# Patient Record
Sex: FEMALE | Race: WHITE
[De-identification: names, ages, dates, MRNs, and addresses within clinical notes are randomized per-mention and may not be internally consistent; named-entity substitution may affect disease eponyms.]

---

## 2023-03-31 LAB
ALANINE AMINOTRANSFERASE (SGPT) (U/L) IN SER/PLAS: 36 U/L (ref 7–45)
ANION GAP IN SER/PLAS: 12 MMOL/L (ref 10–20)
ASPARTATE AMINOTRANSFERASE (SGOT) (U/L) IN SER/PLAS: 23 U/L (ref 9–39)
BASOPHILS (10*3/UL) IN BLOOD BY AUTOMATED COUNT: 0.07 X10E9/L (ref 0–0.1)
BASOPHILS/100 LEUKOCYTES IN BLOOD BY AUTOMATED COUNT: 0.7 % (ref 0–2)
CALCIDIOL (25 OH VITAMIN D3) (NG/ML) IN SER/PLAS: 26 NG/ML
CALCIUM (MG/DL) IN SER/PLAS: 9.5 MG/DL (ref 8.6–10.3)
CARBON DIOXIDE, TOTAL (MMOL/L) IN SER/PLAS: 27 MMOL/L (ref 21–32)
CHLORIDE (MMOL/L) IN SER/PLAS: 104 MMOL/L (ref 98–107)
CHOLESTEROL (MG/DL) IN SER/PLAS: 209 MG/DL (ref 0–199)
CHOLESTEROL IN HDL (MG/DL) IN SER/PLAS: 46 MG/DL
CHOLESTEROL/HDL RATIO: 4.5
COBALAMIN (VITAMIN B12) (PG/ML) IN SER/PLAS: 282 PG/ML (ref 211–911)
CREATININE (MG/DL) IN SER/PLAS: 0.76 MG/DL (ref 0.5–1.05)
EOSINOPHILS (10*3/UL) IN BLOOD BY AUTOMATED COUNT: 0.83 X10E9/L (ref 0–0.7)
EOSINOPHILS/100 LEUKOCYTES IN BLOOD BY AUTOMATED COUNT: 7.7 % (ref 0–6)
ERYTHROCYTE DISTRIBUTION WIDTH (RATIO) BY AUTOMATED COUNT: 13.8 % (ref 11.5–14.5)
ERYTHROCYTE MEAN CORPUSCULAR HEMOGLOBIN CONCENTRATION (G/DL) BY AUTOMATED: 31.5 G/DL (ref 32–36)
ERYTHROCYTE MEAN CORPUSCULAR VOLUME (FL) BY AUTOMATED COUNT: 84 FL (ref 80–100)
ERYTHROCYTES (10*6/UL) IN BLOOD BY AUTOMATED COUNT: 4.77 X10E12/L (ref 4–5.2)
ESTIMATED AVERAGE GLUCOSE FOR HBA1C: 103 MG/DL
FERRITIN (UG/LL) IN SER/PLAS: 18 UG/L (ref 8–150)
FOLATE (NG/ML) IN SER/PLAS: 7 NG/ML
GFR FEMALE: >90 ML/MIN/1.73M2
GLUCOSE (MG/DL) IN SER/PLAS: 93 MG/DL (ref 74–99)
HEMATOCRIT (%) IN BLOOD BY AUTOMATED COUNT: 40 % (ref 36–46)
HEMOGLOBIN (G/DL) IN BLOOD: 12.6 G/DL (ref 12–16)
HEMOGLOBIN A1C/HEMOGLOBIN TOTAL IN BLOOD: 5.2 %
IMMATURE GRANULOCYTES/100 LEUKOCYTES IN BLOOD BY AUTOMATED COUNT: 0.3 % (ref 0–0.9)
IRON (UG/DL) IN SER/PLAS: 56 UG/DL (ref 35–150)
IRON BINDING CAPACITY (UG/DL) IN SER/PLAS: 485 UG/DL (ref 240–445)
IRON SATURATION (%) IN SER/PLAS: 12 % (ref 25–45)
LDL: 122 MG/DL (ref 0–99)
LEUKOCYTES (10*3/UL) IN BLOOD BY AUTOMATED COUNT: 10.7 X10E9/L (ref 4.4–11.3)
LITHIUM (MOL/L) IN SER/PLAS: 0.74 MMOL/L (ref 0.6–1.2)
LYMPHOCYTES (10*3/UL) IN BLOOD BY AUTOMATED COUNT: 2.52 X10E9/L (ref 1.2–4.8)
LYMPHOCYTES/100 LEUKOCYTES IN BLOOD BY AUTOMATED COUNT: 23.5 % (ref 13–44)
MONOCYTES (10*3/UL) IN BLOOD BY AUTOMATED COUNT: 0.43 X10E9/L (ref 0.1–1)
MONOCYTES/100 LEUKOCYTES IN BLOOD BY AUTOMATED COUNT: 4 % (ref 2–10)
NEUTROPHILS (10*3/UL) IN BLOOD BY AUTOMATED COUNT: 6.84 X10E9/L (ref 1.2–7.7)
NEUTROPHILS/100 LEUKOCYTES IN BLOOD BY AUTOMATED COUNT: 63.8 % (ref 40–80)
NON HDL CHOLESTEROL: 163 MG/DL
PLATELETS (10*3/UL) IN BLOOD AUTOMATED COUNT: 410 X10E9/L (ref 150–450)
POTASSIUM (MMOL/L) IN SER/PLAS: 3.9 MMOL/L (ref 3.5–5.3)
SODIUM (MMOL/L) IN SER/PLAS: 139 MMOL/L (ref 136–145)
THYROTROPIN (MIU/L) IN SER/PLAS BY DETECTION LIMIT <= 0.05 MIU/L: 1.88 MIU/L (ref 0.44–3.98)
TRIGLYCERIDE (MG/DL) IN SER/PLAS: 206 MG/DL (ref 0–149)
UREA NITROGEN (MG/DL) IN SER/PLAS: 16 MG/DL (ref 6–23)
VLDL: 41 MG/DL (ref 0–40)

## 2023-05-09 ENCOUNTER — HOSPITAL ENCOUNTER (OUTPATIENT)
Dept: HOSPITAL 100 - BHIOP | Age: 33
LOS: 22 days | End: 2023-05-31
Payer: MEDICAID

## 2023-05-09 DIAGNOSIS — F31.81: Primary | ICD-10-CM

## 2023-05-09 DIAGNOSIS — F43.10: ICD-10-CM

## 2023-05-09 DIAGNOSIS — F12.90: ICD-10-CM

## 2023-05-09 DIAGNOSIS — F41.1: ICD-10-CM

## 2023-05-09 PROCEDURE — T1002 RN SERVICES UP TO 15 MINUTES: HCPCS

## 2023-05-09 PROCEDURE — 99214 OFFICE O/P EST MOD 30 MIN: CPT

## 2023-05-09 PROCEDURE — 90792 PSYCH DIAG EVAL W/MED SRVCS: CPT

## 2023-05-10 VITALS — SYSTOLIC BLOOD PRESSURE: 156 MMHG | HEART RATE: 105 BPM | DIASTOLIC BLOOD PRESSURE: 90 MMHG

## 2023-06-01 ENCOUNTER — HOSPITAL ENCOUNTER (OUTPATIENT)
Dept: HOSPITAL 100 - BHIOP | Age: 33
LOS: 29 days | End: 2023-06-30
Payer: MEDICAID

## 2023-06-01 VITALS — SYSTOLIC BLOOD PRESSURE: 156 MMHG | HEART RATE: 105 BPM | DIASTOLIC BLOOD PRESSURE: 90 MMHG

## 2023-06-01 DIAGNOSIS — F31.81: Primary | ICD-10-CM

## 2023-06-01 DIAGNOSIS — F41.1: ICD-10-CM

## 2023-06-01 DIAGNOSIS — F43.10: ICD-10-CM

## 2023-06-01 DIAGNOSIS — F12.90: ICD-10-CM

## 2023-06-01 PROCEDURE — 99214 OFFICE O/P EST MOD 30 MIN: CPT

## 2023-06-05 ENCOUNTER — OFFICE VISIT (OUTPATIENT)
Dept: PRIMARY CARE | Facility: CLINIC | Age: 33
End: 2023-06-05
Payer: COMMERCIAL

## 2023-06-05 VITALS
HEART RATE: 97 BPM | SYSTOLIC BLOOD PRESSURE: 138 MMHG | HEIGHT: 64 IN | BODY MASS INDEX: 42.17 KG/M2 | OXYGEN SATURATION: 98 % | DIASTOLIC BLOOD PRESSURE: 92 MMHG | WEIGHT: 247 LBS

## 2023-06-05 DIAGNOSIS — I10 PRIMARY HYPERTENSION: Primary | ICD-10-CM

## 2023-06-05 DIAGNOSIS — E66.01 CLASS 3 SEVERE OBESITY WITHOUT SERIOUS COMORBIDITY WITH BODY MASS INDEX (BMI) OF 40.0 TO 44.9 IN ADULT, UNSPECIFIED OBESITY TYPE (MULTI): ICD-10-CM

## 2023-06-05 DIAGNOSIS — F31.9 BIPOLAR DEPRESSION (MULTI): ICD-10-CM

## 2023-06-05 PROBLEM — E66.813 CLASS 3 SEVERE OBESITY WITHOUT SERIOUS COMORBIDITY WITH BODY MASS INDEX (BMI) OF 40.0 TO 44.9 IN ADULT: Status: ACTIVE | Noted: 2023-06-05

## 2023-06-05 PROBLEM — J30.9 RHINITIS, ALLERGIC: Status: ACTIVE | Noted: 2023-06-05

## 2023-06-05 PROBLEM — F32.A DEPRESSION: Status: ACTIVE | Noted: 2023-06-05

## 2023-06-05 PROBLEM — L71.9 ROSACEA: Status: ACTIVE | Noted: 2023-06-05

## 2023-06-05 PROCEDURE — 99214 OFFICE O/P EST MOD 30 MIN: CPT | Performed by: FAMILY MEDICINE

## 2023-06-05 PROCEDURE — 3080F DIAST BP >= 90 MM HG: CPT | Performed by: FAMILY MEDICINE

## 2023-06-05 PROCEDURE — 3075F SYST BP GE 130 - 139MM HG: CPT | Performed by: FAMILY MEDICINE

## 2023-06-05 PROCEDURE — 3008F BODY MASS INDEX DOCD: CPT | Performed by: FAMILY MEDICINE

## 2023-06-05 RX ORDER — METOPROLOL SUCCINATE 25 MG/1
25 TABLET, EXTENDED RELEASE ORAL DAILY
Qty: 30 TABLET | Refills: 5 | Status: SHIPPED | OUTPATIENT
Start: 2023-06-05 | End: 2023-07-28 | Stop reason: ALTCHOICE

## 2023-06-05 RX ORDER — LITHIUM CARBONATE 450 MG/1
900 TABLET ORAL NIGHTLY
COMMUNITY
End: 2023-07-28 | Stop reason: ALTCHOICE

## 2023-06-05 RX ORDER — ACETAMINOPHEN 500 MG
5000 TABLET ORAL DAILY
COMMUNITY
Start: 2023-04-21

## 2023-06-05 RX ORDER — PYRIDOXINE HCL (VITAMIN B6) 100 MG
100 TABLET ORAL DAILY
COMMUNITY

## 2023-06-05 RX ORDER — QUETIAPINE FUMARATE 400 MG/1
200 TABLET, FILM COATED ORAL NIGHTLY
COMMUNITY
Start: 2023-05-04 | End: 2024-05-21 | Stop reason: WASHOUT

## 2023-06-05 RX ORDER — HYDROXYZINE PAMOATE 25 MG/1
25 CAPSULE ORAL 2 TIMES DAILY
COMMUNITY
Start: 2023-05-31 | End: 2024-05-21 | Stop reason: WASHOUT

## 2023-06-05 RX ORDER — LITHIUM CARBONATE 300 MG/1
300 TABLET, FILM COATED, EXTENDED RELEASE ORAL DAILY
COMMUNITY
Start: 2023-05-10 | End: 2023-07-28 | Stop reason: ALTCHOICE

## 2023-06-05 ASSESSMENT — PATIENT HEALTH QUESTIONNAIRE - PHQ9
2. FEELING DOWN, DEPRESSED OR HOPELESS: NOT AT ALL
SUM OF ALL RESPONSES TO PHQ9 QUESTIONS 1 AND 2: 0
1. LITTLE INTEREST OR PLEASURE IN DOING THINGS: NOT AT ALL

## 2023-06-05 NOTE — PROGRESS NOTES
Subjective   Radha Corley is a 32 y.o. female who presents for Foot Swelling and Weight Gain.  Here c/o weight gain and ankle swelling.  She is seeing psychiatry for her bipolar disorder - doing outpatient therapy in [place].  Feels like her mental health is pretty stable right now.  Her BP has been running higher recently.              Objective   Visit Vitals  BP (!) 138/92   Pulse 97      Physical Exam  Vitals reviewed.   Constitutional:       General: She is not in acute distress.  Cardiovascular:      Rate and Rhythm: Normal rate and regular rhythm.      Heart sounds: No murmur heard.  Pulmonary:      Effort: Pulmonary effort is normal. No respiratory distress.      Breath sounds: Normal breath sounds.   Skin:     General: Skin is warm and dry.   Neurological:      General: No focal deficit present.      Mental Status: She is alert. Mental status is at baseline.         Assessment/Plan   Problem List Items Addressed This Visit          Endocrine/Metabolic    Class 3 severe obesity without serious comorbidity with body mass index (BMI) of 40.0 to 44.9 in adult (CMS/[facility])       Other    Bipolar depression (CMS/[facility])     Other Visit Diagnoses       Primary hypertension    -  Primary               Kerrie Fall MD

## 2023-06-05 NOTE — PATIENT INSTRUCTIONS
Will check some labs, start metoprolol for the BP - discussed diuretics but with her lithium we will try to avoid those at this time.  Increase water intake, encouraged support stockings.  Follow up here in 2 weeks.

## 2023-06-09 ENCOUNTER — LAB (OUTPATIENT)
Dept: LAB | Facility: LAB | Age: 33
End: 2023-06-09
Payer: COMMERCIAL

## 2023-06-09 DIAGNOSIS — E66.01 CLASS 3 SEVERE OBESITY WITHOUT SERIOUS COMORBIDITY WITH BODY MASS INDEX (BMI) OF 40.0 TO 44.9 IN ADULT, UNSPECIFIED OBESITY TYPE (MULTI): ICD-10-CM

## 2023-06-09 DIAGNOSIS — I10 PRIMARY HYPERTENSION: ICD-10-CM

## 2023-06-09 DIAGNOSIS — F31.9 BIPOLAR DEPRESSION (MULTI): ICD-10-CM

## 2023-06-09 LAB
ALANINE AMINOTRANSFERASE (SGPT) (U/L) IN SER/PLAS: 27 U/L (ref 7–45)
ALBUMIN (G/DL) IN SER/PLAS: 4.1 G/DL (ref 3.4–5)
ALKALINE PHOSPHATASE (U/L) IN SER/PLAS: 58 U/L (ref 33–110)
ANION GAP IN SER/PLAS: 11 MMOL/L (ref 10–20)
ASPARTATE AMINOTRANSFERASE (SGOT) (U/L) IN SER/PLAS: 17 U/L (ref 9–39)
BASOPHILS (10*3/UL) IN BLOOD BY AUTOMATED COUNT: 0.06 X10E9/L (ref 0–0.1)
BASOPHILS/100 LEUKOCYTES IN BLOOD BY AUTOMATED COUNT: 0.5 % (ref 0–2)
BILIRUBIN TOTAL (MG/DL) IN SER/PLAS: 0.4 MG/DL (ref 0–1.2)
CALCIDIOL (25 OH VITAMIN D3) (NG/ML) IN SER/PLAS: 26 NG/ML
CALCIUM (MG/DL) IN SER/PLAS: 9.5 MG/DL (ref 8.6–10.3)
CARBON DIOXIDE, TOTAL (MMOL/L) IN SER/PLAS: 25 MMOL/L (ref 21–32)
CHLORIDE (MMOL/L) IN SER/PLAS: 105 MMOL/L (ref 98–107)
CHOLESTEROL (MG/DL) IN SER/PLAS: 183 MG/DL (ref 0–199)
CHOLESTEROL IN HDL (MG/DL) IN SER/PLAS: 48 MG/DL
CHOLESTEROL/HDL RATIO: 3.8
COBALAMIN (VITAMIN B12) (PG/ML) IN SER/PLAS: 358 PG/ML (ref 211–911)
CREATININE (MG/DL) IN SER/PLAS: 0.77 MG/DL (ref 0.5–1.05)
EOSINOPHILS (10*3/UL) IN BLOOD BY AUTOMATED COUNT: 0.76 X10E9/L (ref 0–0.7)
EOSINOPHILS/100 LEUKOCYTES IN BLOOD BY AUTOMATED COUNT: 5.8 % (ref 0–6)
ERYTHROCYTE DISTRIBUTION WIDTH (RATIO) BY AUTOMATED COUNT: 14.6 % (ref 11.5–14.5)
ERYTHROCYTE MEAN CORPUSCULAR HEMOGLOBIN CONCENTRATION (G/DL) BY AUTOMATED: 31.3 G/DL (ref 32–36)
ERYTHROCYTE MEAN CORPUSCULAR VOLUME (FL) BY AUTOMATED COUNT: 82 FL (ref 80–100)
ERYTHROCYTES (10*6/UL) IN BLOOD BY AUTOMATED COUNT: 4.93 X10E12/L (ref 4–5.2)
ESTIMATED AVERAGE GLUCOSE FOR HBA1C: 108 MG/DL
GFR FEMALE: >90 ML/MIN/1.73M2
GLUCOSE (MG/DL) IN SER/PLAS: 91 MG/DL (ref 74–99)
HEMATOCRIT (%) IN BLOOD BY AUTOMATED COUNT: 40.6 % (ref 36–46)
HEMOGLOBIN (G/DL) IN BLOOD: 12.7 G/DL (ref 12–16)
HEMOGLOBIN A1C/HEMOGLOBIN TOTAL IN BLOOD: 5.4 %
IMMATURE GRANULOCYTES/100 LEUKOCYTES IN BLOOD BY AUTOMATED COUNT: 0.4 % (ref 0–0.9)
LDL: 101 MG/DL (ref 0–99)
LEUKOCYTES (10*3/UL) IN BLOOD BY AUTOMATED COUNT: 13 X10E9/L (ref 4.4–11.3)
LITHIUM (MOL/L) IN SER/PLAS: 0.78 MMOL/L (ref 0.6–1.2)
LYMPHOCYTES (10*3/UL) IN BLOOD BY AUTOMATED COUNT: 2.98 X10E9/L (ref 1.2–4.8)
LYMPHOCYTES/100 LEUKOCYTES IN BLOOD BY AUTOMATED COUNT: 22.9 % (ref 13–44)
MONOCYTES (10*3/UL) IN BLOOD BY AUTOMATED COUNT: 0.54 X10E9/L (ref 0.1–1)
MONOCYTES/100 LEUKOCYTES IN BLOOD BY AUTOMATED COUNT: 4.2 % (ref 2–10)
NEUTROPHILS (10*3/UL) IN BLOOD BY AUTOMATED COUNT: 8.61 X10E9/L (ref 1.2–7.7)
NEUTROPHILS/100 LEUKOCYTES IN BLOOD BY AUTOMATED COUNT: 66.2 % (ref 40–80)
PLATELETS (10*3/UL) IN BLOOD AUTOMATED COUNT: 397 X10E9/L (ref 150–450)
POTASSIUM (MMOL/L) IN SER/PLAS: 4.2 MMOL/L (ref 3.5–5.3)
PROTEIN TOTAL: 6.5 G/DL (ref 6.4–8.2)
SODIUM (MMOL/L) IN SER/PLAS: 137 MMOL/L (ref 136–145)
THYROTROPIN (MIU/L) IN SER/PLAS BY DETECTION LIMIT <= 0.05 MIU/L: 3.02 MIU/L (ref 0.44–3.98)
TRIGLYCERIDE (MG/DL) IN SER/PLAS: 168 MG/DL (ref 0–149)
UREA NITROGEN (MG/DL) IN SER/PLAS: 10 MG/DL (ref 6–23)
VLDL: 34 MG/DL (ref 0–40)

## 2023-06-09 PROCEDURE — 80053 COMPREHEN METABOLIC PANEL: CPT

## 2023-06-09 PROCEDURE — 82607 VITAMIN B-12: CPT

## 2023-06-09 PROCEDURE — 85025 COMPLETE CBC W/AUTO DIFF WBC: CPT

## 2023-06-09 PROCEDURE — 83036 HEMOGLOBIN GLYCOSYLATED A1C: CPT

## 2023-06-09 PROCEDURE — 84443 ASSAY THYROID STIM HORMONE: CPT

## 2023-06-09 PROCEDURE — 36415 COLL VENOUS BLD VENIPUNCTURE: CPT

## 2023-06-22 ENCOUNTER — APPOINTMENT (OUTPATIENT)
Dept: PRIMARY CARE | Facility: CLINIC | Age: 33
End: 2023-06-22
Payer: COMMERCIAL

## 2023-06-23 ENCOUNTER — OFFICE VISIT (OUTPATIENT)
Dept: PRIMARY CARE | Facility: CLINIC | Age: 33
End: 2023-06-23
Payer: COMMERCIAL

## 2023-06-23 VITALS
WEIGHT: 248.7 LBS | HEIGHT: 64 IN | BODY MASS INDEX: 42.46 KG/M2 | HEART RATE: 93 BPM | DIASTOLIC BLOOD PRESSURE: 80 MMHG | SYSTOLIC BLOOD PRESSURE: 122 MMHG

## 2023-06-23 DIAGNOSIS — F31.9 BIPOLAR DEPRESSION (MULTI): ICD-10-CM

## 2023-06-23 DIAGNOSIS — E66.01 CLASS 3 SEVERE OBESITY WITHOUT SERIOUS COMORBIDITY WITH BODY MASS INDEX (BMI) OF 40.0 TO 44.9 IN ADULT, UNSPECIFIED OBESITY TYPE (MULTI): ICD-10-CM

## 2023-06-23 DIAGNOSIS — I10 PRIMARY HYPERTENSION: ICD-10-CM

## 2023-06-23 PROCEDURE — 3008F BODY MASS INDEX DOCD: CPT | Performed by: FAMILY MEDICINE

## 2023-06-23 PROCEDURE — 3074F SYST BP LT 130 MM HG: CPT | Performed by: FAMILY MEDICINE

## 2023-06-23 PROCEDURE — 99214 OFFICE O/P EST MOD 30 MIN: CPT | Performed by: FAMILY MEDICINE

## 2023-06-23 PROCEDURE — 3079F DIAST BP 80-89 MM HG: CPT | Performed by: FAMILY MEDICINE

## 2023-06-23 NOTE — PROGRESS NOTES
Subjective   Radha Corley is a 32 y.o. female who presents for No chief complaint on file..  Here for follow up recent testing.  She is frustrated still with her weight and swelling.  She states that she did talk with the psychiatrist and her seroquel has been decreased.  They discussed decreasing the lithium as well but she did not want to make too many changes at once.  We reviewed her labs which were unremarkable and her EKG was fine.  She is not wearing compression stockings and I did encourage her to try those.  We discussed possibly starting a diuretic but want to make sure her psychiatrist is ok with that and she states that she will check with them and get back to us.  We discussed that one of the biggest risks is increased lithium in the blood stream and so it may be reasonable to decrease the lithium and start a diuretic with close monitoring.  Her BP is much better now.              Objective   Visit Vitals  /80 (BP Location: Left arm, Patient Position: Sitting, BP Cuff Size: Adult)   Pulse 93      Physical Exam  Vitals reviewed.   Constitutional:       General: She is not in acute distress.  Cardiovascular:      Rate and Rhythm: Normal rate and regular rhythm.      Heart sounds: No murmur heard.     Comments: Mod edema both lower extremities  Pulmonary:      Effort: Pulmonary effort is normal. No respiratory distress.      Breath sounds: Normal breath sounds.   Skin:     General: Skin is warm and dry.   Neurological:      General: No focal deficit present.      Mental Status: She is alert. Mental status is at baseline.        Latest Reference Range & Units 06/09/23 08:53   GLUCOSE 74 - 99 mg/dL 91   SODIUM 136 - 145 mmol/L 137   POTASSIUM 3.5 - 5.3 mmol/L 4.2   CHLORIDE 98 - 107 mmol/L 105   Bicarbonate 21 - 32 mmol/L 25   Anion Gap 10 - 20 mmol/L 11   Blood Urea Nitrogen 6 - 23 mg/dL 10   Creatinine 0.50 - 1.05 mg/dL 0.77   Calcium 8.6 - 10.3 mg/dL 9.5   Albumin 3.4 - 5.0 g/dL 4.1    Alkaline Phosphatase 33 - 110 U/L 58   ALT 7 - 45 U/L 27   AST 9 - 39 U/L 17   Bilirubin Total 0.0 - 1.2 mg/dL 0.4   Total Protein 6.4 - 8.2 g/dL 6.5   Vitamin B12 211 - 911 pg/mL 358   Hemoglobin A1C % 5.4   Thyroid Stimulating Hormone 0.44 - 3.98 mIU/L 3.02   Estimated Average Glucose MG/   WBC 4.4 - 11.3 x10E9/L 13.0 (H)   RBC 4.00 - 5.20 x10E12/L 4.93   HEMOGLOBIN 12.0 - 16.0 g/dL 12.7   HEMATOCRIT 36.0 - 46.0 % 40.6   MCV 80 - 100 fL 82   MCHC 32.0 - 36.0 g/dL 31.3 (L)   Platelets 150 - 450 x10E9/L 397   Neutrophils % 40.0 - 80.0 % 66.2   Immature Granulocytes %, Automated 0.0 - 0.9 % 0.4   Lymphocytes % 13.0 - 44.0 % 22.9   Monocytes % 2.0 - 10.0 % 4.2   Eosinophils % 0.0 - 6.0 % 5.8   Basophils % 0.0 - 2.0 % 0.5   Neutrophils Absolute 1.20 - 7.70 x10E9/L 8.61 (H)   Lymphocytes Absolute 1.20 - 4.80 x10E9/L 2.98   Monocytes Absolute 0.10 - 1.00 x10E9/L 0.54   Eosinophils Absolute 0.00 - 0.70 x10E9/L 0.76 (H)   Basophils Absolute 0.00 - 0.10 x10E9/L 0.06   RED CELL DISTRIBUTION WIDTH 11.5 - 14.5 % 14.6 (H)   GFR Female >90 mL/min/1.73m2 >90   (H): Data is abnormally high  (L): Data is abnormally low    Assessment/Plan   Problem List Items Addressed This Visit       Bipolar depression (CMS/[facility])    Relevant Orders    Follow Up In Primary Care    Class 3 severe obesity without serious comorbidity with body mass index (BMI) of 40.0 to 44.9 in adult (CMS/HCC)    Relevant Orders    Follow Up In Primary Care    Primary hypertension    Relevant Orders    Follow Up In Primary Care          Kerrie Fall MD

## 2023-06-23 NOTE — PROGRESS NOTES
Subjective   Patient ID: Radha Corley is a 32 y.o. female who presents for blood work, EKG and medication follow up. Still has still retaining fluid.     HPI     Review of Systems    Objective   There were no vitals taken for this visit.    Physical Exam    Assessment/Plan

## 2023-06-23 NOTE — PATIENT INSTRUCTIONS
She will check with her psychiatrist about possibly starting a water pill and let us know.  Encouraged her to try to wear the compression stockings.  Follow up here in 1 month.

## 2023-07-26 ENCOUNTER — TELEPHONE (OUTPATIENT)
Dept: PRIMARY CARE | Facility: CLINIC | Age: 33
End: 2023-07-26
Payer: COMMERCIAL

## 2023-07-26 NOTE — TELEPHONE ENCOUNTER
PT CALLED TO LET YOU KNOW SHE IS NO LONGER ON LITHIUM SO YOU CAN PRESCRIBE ANY WATER PILL YOU WANT. SHE USES [facility] HERE IN [place].

## 2023-07-28 ENCOUNTER — OFFICE VISIT (OUTPATIENT)
Dept: PRIMARY CARE | Facility: CLINIC | Age: 33
End: 2023-07-28
Payer: COMMERCIAL

## 2023-07-28 VITALS
BODY MASS INDEX: 42.34 KG/M2 | SYSTOLIC BLOOD PRESSURE: 142 MMHG | OXYGEN SATURATION: 98 % | HEIGHT: 64 IN | WEIGHT: 248 LBS | DIASTOLIC BLOOD PRESSURE: 92 MMHG | HEART RATE: 109 BPM

## 2023-07-28 DIAGNOSIS — E66.01 CLASS 3 SEVERE OBESITY WITHOUT SERIOUS COMORBIDITY WITH BODY MASS INDEX (BMI) OF 40.0 TO 44.9 IN ADULT, UNSPECIFIED OBESITY TYPE (MULTI): ICD-10-CM

## 2023-07-28 DIAGNOSIS — F31.9 BIPOLAR DEPRESSION (MULTI): ICD-10-CM

## 2023-07-28 DIAGNOSIS — I10 PRIMARY HYPERTENSION: ICD-10-CM

## 2023-07-28 PROCEDURE — 3008F BODY MASS INDEX DOCD: CPT | Performed by: FAMILY MEDICINE

## 2023-07-28 PROCEDURE — 99213 OFFICE O/P EST LOW 20 MIN: CPT | Performed by: FAMILY MEDICINE

## 2023-07-28 PROCEDURE — 3077F SYST BP >= 140 MM HG: CPT | Performed by: FAMILY MEDICINE

## 2023-07-28 PROCEDURE — 3080F DIAST BP >= 90 MM HG: CPT | Performed by: FAMILY MEDICINE

## 2023-07-28 RX ORDER — SPIRONOLACTONE 25 MG/1
25 TABLET ORAL DAILY
Qty: 30 TABLET | Refills: 5 | Status: SHIPPED | OUTPATIENT
Start: 2023-07-28 | End: 2024-01-17

## 2023-07-28 NOTE — PROGRESS NOTES
Subjective   Patient ID: Radha Corley is a 32 y.o. female who presents for 1 month follow up was taken off the lithium so she can start taking the water pill.     HPI     Review of Systems    Objective   There were no vitals taken for this visit.    Physical Exam    Assessment/Plan

## 2023-07-28 NOTE — PROGRESS NOTES
Subjective   Radha Corley is a 32 y.o. female who presents for No chief complaint on file..  Here for follow up weight gain, ankle swelling, bipolar disorder.  She states that she is off the lithium now and seroquel has been decreased.  She is feeling a little bit better - swelling did get a little better, but she still has some swelling.              Objective   Visit Vitals  BP (!) 142/92 (BP Location: Left arm, Patient Position: Sitting, BP Cuff Size: Adult)   Pulse 109      Physical Exam  Vitals reviewed.   Constitutional:       General: She is not in acute distress.  Pulmonary:      Effort: Pulmonary effort is normal. No respiratory distress.   Skin:     General: Skin is warm and dry.   Neurological:      General: No focal deficit present.      Mental Status: She is alert. Mental status is at baseline.         Assessment/Plan   Problem List Items Addressed This Visit       Bipolar depression (CMS/HCC)    Relevant Orders    Follow Up In Primary Care - Established    Class 3 severe obesity without serious comorbidity with body mass index (BMI) of 40.0 to 44.9 in adult (CMS/HCC)    Relevant Orders    Follow Up In Primary Care - Established    Primary hypertension    Relevant Medications    spironolactone (Aldactone) 25 mg tablet    Other Relevant Orders    Follow Up In Primary Care - Established          Kerrie Fall MD

## 2023-08-29 ENCOUNTER — OFFICE VISIT (OUTPATIENT)
Dept: PRIMARY CARE | Facility: CLINIC | Age: 33
End: 2023-08-29
Payer: COMMERCIAL

## 2023-08-29 VITALS
HEIGHT: 64 IN | OXYGEN SATURATION: 98 % | BODY MASS INDEX: 43.13 KG/M2 | SYSTOLIC BLOOD PRESSURE: 128 MMHG | HEART RATE: 104 BPM | DIASTOLIC BLOOD PRESSURE: 82 MMHG | WEIGHT: 252.6 LBS

## 2023-08-29 DIAGNOSIS — E66.01 CLASS 3 SEVERE OBESITY WITHOUT SERIOUS COMORBIDITY WITH BODY MASS INDEX (BMI) OF 40.0 TO 44.9 IN ADULT, UNSPECIFIED OBESITY TYPE (MULTI): ICD-10-CM

## 2023-08-29 DIAGNOSIS — I10 PRIMARY HYPERTENSION: ICD-10-CM

## 2023-08-29 DIAGNOSIS — F31.9 BIPOLAR DEPRESSION (MULTI): ICD-10-CM

## 2023-08-29 PROCEDURE — 99213 OFFICE O/P EST LOW 20 MIN: CPT | Performed by: FAMILY MEDICINE

## 2023-08-29 PROCEDURE — 3079F DIAST BP 80-89 MM HG: CPT | Performed by: FAMILY MEDICINE

## 2023-08-29 PROCEDURE — 3074F SYST BP LT 130 MM HG: CPT | Performed by: FAMILY MEDICINE

## 2023-08-29 PROCEDURE — 3008F BODY MASS INDEX DOCD: CPT | Performed by: FAMILY MEDICINE

## 2023-08-29 RX ORDER — METOPROLOL SUCCINATE 25 MG/1
25 TABLET, EXTENDED RELEASE ORAL DAILY
Qty: 30 TABLET | Refills: 5 | Status: SHIPPED | OUTPATIENT
Start: 2023-08-29 | End: 2023-12-01 | Stop reason: SDUPTHER

## 2023-08-29 NOTE — PROGRESS NOTES
Subjective   Radha Corley is a 32 y.o. female who presents for No chief complaint on file..  Here for follow up.  She states that recently she has started having more issues with post-coital headaches - had them in the past but had not had them in a while.  She has also had some higher BP readings at home as well.  She was on metoprolol before and did not have any side effects with that so we will restart that.             Objective   Visit Vitals  /82 (BP Location: Left arm, Patient Position: Sitting, BP Cuff Size: Adult)   Pulse 104      Physical Exam  Vitals reviewed.   Constitutional:       General: She is not in acute distress.  Cardiovascular:      Rate and Rhythm: Normal rate and regular rhythm.      Heart sounds: No murmur heard.  Pulmonary:      Effort: Pulmonary effort is normal. No respiratory distress.      Breath sounds: Normal breath sounds.   Skin:     General: Skin is warm and dry.   Neurological:      General: No focal deficit present.      Mental Status: She is alert. Mental status is at baseline.         Assessment/Plan   Problem List Items Addressed This Visit       Bipolar depression (CMS/HCC)    Relevant Orders    Follow Up In Primary Care - Established    Class 3 severe obesity without serious comorbidity with body mass index (BMI) of 40.0 to 44.9 in adult (CMS/HCC)    Relevant Orders    Follow Up In Primary Care - Established    Primary hypertension    Relevant Medications    metoprolol succinate XL (Toprol-XL) 25 mg 24 hr tablet    Other Relevant Orders    Follow Up In Primary Care - Established          Kerrie Fall MD

## 2023-08-29 NOTE — PATIENT INSTRUCTIONS
Will restart metoprolol, continue other current medications.  Follow up in a month, sooner if needed.

## 2023-08-29 NOTE — PROGRESS NOTES
Subjective   Patient ID: Radha Corley is a 32 y.o. female who presents for 1 month follow up. Patient states she is still retaining the fluid and she has increased her water intake and also states evaluating her feet. Also been taking her /92 and it has been consistently  high. Headaches every day for 2 weeks now.     HPI     Review of Systems    Objective   There were no vitals taken for this visit.    Physical Exam    Assessment/Plan           146.2

## 2023-09-28 ENCOUNTER — OFFICE VISIT (OUTPATIENT)
Dept: PRIMARY CARE | Facility: CLINIC | Age: 33
End: 2023-09-28
Payer: COMMERCIAL

## 2023-09-28 VITALS
DIASTOLIC BLOOD PRESSURE: 76 MMHG | HEART RATE: 99 BPM | WEIGHT: 252.8 LBS | SYSTOLIC BLOOD PRESSURE: 120 MMHG | BODY MASS INDEX: 43.16 KG/M2 | OXYGEN SATURATION: 99 % | HEIGHT: 64 IN

## 2023-09-28 DIAGNOSIS — I10 PRIMARY HYPERTENSION: ICD-10-CM

## 2023-09-28 DIAGNOSIS — E66.01 CLASS 3 SEVERE OBESITY WITHOUT SERIOUS COMORBIDITY WITH BODY MASS INDEX (BMI) OF 40.0 TO 44.9 IN ADULT, UNSPECIFIED OBESITY TYPE (MULTI): ICD-10-CM

## 2023-09-28 DIAGNOSIS — F31.9 BIPOLAR DEPRESSION (MULTI): ICD-10-CM

## 2023-09-28 PROCEDURE — 3008F BODY MASS INDEX DOCD: CPT | Performed by: FAMILY MEDICINE

## 2023-09-28 PROCEDURE — 3078F DIAST BP <80 MM HG: CPT | Performed by: FAMILY MEDICINE

## 2023-09-28 PROCEDURE — 99214 OFFICE O/P EST MOD 30 MIN: CPT | Performed by: FAMILY MEDICINE

## 2023-09-28 PROCEDURE — 3074F SYST BP LT 130 MM HG: CPT | Performed by: FAMILY MEDICINE

## 2023-09-28 NOTE — PATIENT INSTRUCTIONS
Continue current medications.  Follow up with specialists as scheduled.  Follow up in 3-4 months, sooner if needed.

## 2023-09-28 NOTE — PROGRESS NOTES
Subjective   Patient ID: Radha Corley is a 32 y.o. female who presents for 1 month medication follow up.     HPI     Review of Systems    Objective   There were no vitals taken for this visit.    Physical Exam    Assessment/Plan

## 2023-09-28 NOTE — PROGRESS NOTES
Subjective   Radha Corley is a 32 y.o. female who presents for No chief complaint on file..  Here for follow up headaches, HTN.  She is wearing a patch to try to help her quit smoking.  She is still having some swelling in her ankles off and on.  She feels like her medications are working well for her.       She does need a physical form filled out for a new job as [employer].  She may check with the health department for her vaccination records.      She has some lumps on her scalp - has had cysts removed from her scalp in the past and these feel similar but they are hurting/she feels pressure with them now.  At this point they are not too bothersome and she will continue to monitor - we can refer to surgeon or dermatologist in the future if she would like.              Objective   Visit Vitals  /76 (BP Location: Left arm, Patient Position: Sitting, BP Cuff Size: Adult)   Pulse 99      Physical Exam  Vitals reviewed.   Constitutional:       General: She is not in acute distress.  Cardiovascular:      Rate and Rhythm: Normal rate and regular rhythm.      Heart sounds: No murmur heard.  Pulmonary:      Effort: Pulmonary effort is normal. No respiratory distress.      Breath sounds: Normal breath sounds.   Skin:     General: Skin is warm and dry.      Comments: There are a couple of small soft masses consistent with cysts in the scalp, no erythema, no drainage.     Neurological:      General: No focal deficit present.      Mental Status: She is alert. Mental status is at baseline.         Assessment/Plan   Problem List Items Addressed This Visit       Bipolar depression (CMS/HCC)    Relevant Orders    Follow Up In Primary Care - Established    Class 3 severe obesity without serious comorbidity with body mass index (BMI) of 40.0 to 44.9 in adult (CMS/HCC)    Relevant Orders    Follow Up In Primary Care - Established    Primary hypertension    Relevant Orders    Follow Up In Primary Care - Established           Kerrie Fall MD

## 2023-10-28 ENCOUNTER — LAB (OUTPATIENT)
Dept: LAB | Facility: LAB | Age: 33
End: 2023-10-28
Payer: COMMERCIAL

## 2023-10-28 DIAGNOSIS — R53.83 OTHER FATIGUE: Primary | ICD-10-CM

## 2023-10-28 DIAGNOSIS — Z79.899 OTHER LONG TERM (CURRENT) DRUG THERAPY: ICD-10-CM

## 2023-10-28 LAB
25(OH)D3 SERPL-MCNC: 28 NG/ML (ref 30–100)
ALBUMIN SERPL BCP-MCNC: 4.2 G/DL (ref 3.4–5)
ALP SERPL-CCNC: 57 U/L (ref 33–110)
ALT SERPL W P-5'-P-CCNC: 32 U/L (ref 7–45)
ANION GAP SERPL CALC-SCNC: 11 MMOL/L (ref 10–20)
AST SERPL W P-5'-P-CCNC: 18 U/L (ref 9–39)
BASOPHILS # BLD AUTO: 0.05 X10*3/UL (ref 0–0.1)
BASOPHILS NFR BLD AUTO: 0.6 %
BILIRUB SERPL-MCNC: 0.4 MG/DL (ref 0–1.2)
BUN SERPL-MCNC: 11 MG/DL (ref 6–23)
CALCIUM SERPL-MCNC: 9 MG/DL (ref 8.6–10.3)
CHLORIDE SERPL-SCNC: 108 MMOL/L (ref 98–107)
CHOLEST SERPL-MCNC: 198 MG/DL (ref 0–199)
CHOLESTEROL/HDL RATIO: 4.6
CO2 SERPL-SCNC: 24 MMOL/L (ref 21–32)
CREAT SERPL-MCNC: 0.73 MG/DL (ref 0.5–1.05)
EOSINOPHIL # BLD AUTO: 0.55 X10*3/UL (ref 0–0.7)
EOSINOPHIL NFR BLD AUTO: 6.4 %
ERYTHROCYTE [DISTWIDTH] IN BLOOD BY AUTOMATED COUNT: 15.3 % (ref 11.5–14.5)
EST. AVERAGE GLUCOSE BLD GHB EST-MCNC: 111 MG/DL
FOLATE SERPL-MCNC: 13.8 NG/ML
GFR SERPL CREATININE-BSD FRML MDRD: >90 ML/MIN/1.73M*2
GLUCOSE SERPL-MCNC: 97 MG/DL (ref 74–99)
HBA1C MFR BLD: 5.5 %
HCT VFR BLD AUTO: 38.5 % (ref 36–46)
HDLC SERPL-MCNC: 43 MG/DL
HGB BLD-MCNC: 12.2 G/DL (ref 12–16)
IMM GRANULOCYTES # BLD AUTO: 0.03 X10*3/UL (ref 0–0.7)
IMM GRANULOCYTES NFR BLD AUTO: 0.3 % (ref 0–0.9)
IRON SATN MFR SERPL: 7 % (ref 25–45)
IRON SERPL-MCNC: 33 UG/DL (ref 35–150)
LDLC SERPL CALC-MCNC: 121 MG/DL
LYMPHOCYTES # BLD AUTO: 2.76 X10*3/UL (ref 1.2–4.8)
LYMPHOCYTES NFR BLD AUTO: 32.1 %
MCH RBC QN AUTO: 25.2 PG (ref 26–34)
MCHC RBC AUTO-ENTMCNC: 31.7 G/DL (ref 32–36)
MCV RBC AUTO: 80 FL (ref 80–100)
MONOCYTES # BLD AUTO: 0.44 X10*3/UL (ref 0.1–1)
MONOCYTES NFR BLD AUTO: 5.1 %
NEUTROPHILS # BLD AUTO: 4.76 X10*3/UL (ref 1.2–7.7)
NEUTROPHILS NFR BLD AUTO: 55.5 %
NON HDL CHOLESTEROL: 155 MG/DL (ref 0–149)
NRBC BLD-RTO: 0 /100 WBCS (ref 0–0)
PLATELET # BLD AUTO: 328 X10*3/UL (ref 150–450)
PMV BLD AUTO: 8.3 FL (ref 7.5–11.5)
POTASSIUM SERPL-SCNC: 3.9 MMOL/L (ref 3.5–5.3)
PROLACTIN SERPL-MCNC: 7.5 UG/L (ref 3–20)
PROT SERPL-MCNC: 6.9 G/DL (ref 6.4–8.2)
RBC # BLD AUTO: 4.84 X10*6/UL (ref 4–5.2)
SODIUM SERPL-SCNC: 139 MMOL/L (ref 136–145)
TIBC SERPL-MCNC: 456 UG/DL (ref 240–445)
TRIGL SERPL-MCNC: 172 MG/DL (ref 0–149)
TSH SERPL-ACNC: 0.93 MIU/L (ref 0.44–3.98)
UIBC SERPL-MCNC: 423 UG/DL (ref 110–370)
VIT B12 SERPL-MCNC: 315 PG/ML (ref 211–911)
VLDL: 34 MG/DL (ref 0–40)
WBC # BLD AUTO: 8.6 X10*3/UL (ref 4.4–11.3)

## 2023-10-28 PROCEDURE — 82746 ASSAY OF FOLIC ACID SERUM: CPT

## 2023-10-28 PROCEDURE — 36415 COLL VENOUS BLD VENIPUNCTURE: CPT

## 2023-10-28 PROCEDURE — 84443 ASSAY THYROID STIM HORMONE: CPT

## 2023-10-28 PROCEDURE — 82306 VITAMIN D 25 HYDROXY: CPT

## 2023-10-28 PROCEDURE — 83540 ASSAY OF IRON: CPT

## 2023-10-28 PROCEDURE — 80053 COMPREHEN METABOLIC PANEL: CPT

## 2023-10-28 PROCEDURE — 83550 IRON BINDING TEST: CPT

## 2023-10-28 PROCEDURE — 80061 LIPID PANEL: CPT

## 2023-10-28 PROCEDURE — 82607 VITAMIN B-12: CPT

## 2023-10-28 PROCEDURE — 84146 ASSAY OF PROLACTIN: CPT

## 2023-10-28 PROCEDURE — 83036 HEMOGLOBIN GLYCOSYLATED A1C: CPT

## 2023-10-28 PROCEDURE — 85025 COMPLETE CBC W/AUTO DIFF WBC: CPT

## 2023-11-01 ENCOUNTER — HOSPITAL ENCOUNTER (OUTPATIENT)
Dept: CARDIOLOGY | Facility: HOSPITAL | Age: 33
Discharge: HOME | End: 2023-11-01
Payer: COMMERCIAL

## 2023-11-01 DIAGNOSIS — Z79.899 OTHER LONG TERM (CURRENT) DRUG THERAPY: ICD-10-CM

## 2023-11-01 PROCEDURE — 93010 ELECTROCARDIOGRAM REPORT: CPT | Performed by: STUDENT IN AN ORGANIZED HEALTH CARE EDUCATION/TRAINING PROGRAM

## 2023-11-01 PROCEDURE — 93005 ELECTROCARDIOGRAM TRACING: CPT

## 2023-11-06 LAB
ATRIAL RATE: 97 BPM
P AXIS: 39 DEGREES
P OFFSET: 187 MS
P ONSET: 136 MS
PR INTERVAL: 170 MS
Q ONSET: 221 MS
QRS COUNT: 16 BEATS
QRS DURATION: 94 MS
QT INTERVAL: 344 MS
QTC CALCULATION(BAZETT): 436 MS
QTC FREDERICIA: 403 MS
R AXIS: 50 DEGREES
T AXIS: 48 DEGREES
T OFFSET: 393 MS
VENTRICULAR RATE: 97 BPM

## 2023-11-30 ENCOUNTER — TELEPHONE (OUTPATIENT)
Dept: PRIMARY CARE | Facility: CLINIC | Age: 33
End: 2023-11-30
Payer: COMMERCIAL

## 2023-11-30 DIAGNOSIS — I10 PRIMARY HYPERTENSION: ICD-10-CM

## 2023-11-30 NOTE — TELEPHONE ENCOUNTER
Patient left a voicemail stating her psychiatrists put her on vyvanse and wants her Metoprolol succinate increased to BID

## 2023-11-30 NOTE — TELEPHONE ENCOUNTER
We can increase the dose - it's an extended release medicine so we can increase to 50 mg daily, doesn't have to be twice a day.

## 2023-12-01 RX ORDER — METOPROLOL SUCCINATE 50 MG/1
50 TABLET, EXTENDED RELEASE ORAL DAILY
Qty: 90 TABLET | Refills: 1 | Status: SHIPPED | OUTPATIENT
Start: 2023-12-01 | End: 2024-02-29

## 2023-12-21 ENCOUNTER — OFFICE VISIT (OUTPATIENT)
Dept: PRIMARY CARE | Facility: CLINIC | Age: 33
End: 2023-12-21
Payer: COMMERCIAL

## 2023-12-21 VITALS
WEIGHT: 245.1 LBS | SYSTOLIC BLOOD PRESSURE: 140 MMHG | BODY MASS INDEX: 41.85 KG/M2 | HEIGHT: 64 IN | HEART RATE: 101 BPM | DIASTOLIC BLOOD PRESSURE: 90 MMHG | OXYGEN SATURATION: 97 %

## 2023-12-21 DIAGNOSIS — J20.9 ACUTE BRONCHITIS, UNSPECIFIED ORGANISM: Primary | ICD-10-CM

## 2023-12-21 PROCEDURE — 99213 OFFICE O/P EST LOW 20 MIN: CPT | Performed by: FAMILY MEDICINE

## 2023-12-21 PROCEDURE — 3080F DIAST BP >= 90 MM HG: CPT | Performed by: FAMILY MEDICINE

## 2023-12-21 PROCEDURE — 3077F SYST BP >= 140 MM HG: CPT | Performed by: FAMILY MEDICINE

## 2023-12-21 PROCEDURE — 3008F BODY MASS INDEX DOCD: CPT | Performed by: FAMILY MEDICINE

## 2023-12-21 RX ORDER — LISDEXAMFETAMINE DIMESYLATE 20 MG/1
20 CAPSULE ORAL
COMMUNITY
Start: 2023-11-30 | End: 2024-02-28 | Stop reason: WASHOUT

## 2023-12-21 RX ORDER — AZITHROMYCIN 250 MG/1
TABLET, FILM COATED ORAL
Qty: 6 TABLET | Refills: 0 | Status: SHIPPED | OUTPATIENT
Start: 2023-12-21 | End: 2023-12-26

## 2023-12-21 RX ORDER — PREDNISONE 20 MG/1
20 TABLET ORAL DAILY
Qty: 5 TABLET | Refills: 0 | Status: SHIPPED | OUTPATIENT
Start: 2023-12-21 | End: 2023-12-26

## 2023-12-21 RX ORDER — ALBUTEROL SULFATE 90 UG/1
2 AEROSOL, METERED RESPIRATORY (INHALATION) EVERY 4 HOURS PRN
Qty: 8 G | Refills: 5 | Status: SHIPPED | OUTPATIENT
Start: 2023-12-21 | End: 2024-02-05 | Stop reason: ALTCHOICE

## 2023-12-21 NOTE — PROGRESS NOTES
Subjective   Radha Corley is a 33 y.o. female who presents for No chief complaint on file..  Here c/o cough for the past week.  Has been to urgent care a couple of times.  She states that she was tested for covid and flu and was negative.  She states that they told her to take OTC meds.  She has a lot of congestion, runny nose, aching, fatigued.  Had mild fever.              Objective   Visit Vitals  /90 (BP Location: Left arm, Patient Position: Sitting, BP Cuff Size: Adult)   Pulse 101      Physical Exam  Vitals reviewed.   Constitutional:       General: She is not in acute distress.  Cardiovascular:      Rate and Rhythm: Normal rate and regular rhythm.      Heart sounds: No murmur heard.  Pulmonary:      Effort: Pulmonary effort is normal. No respiratory distress.      Breath sounds: Normal breath sounds.      Comments: Wheezes throughout  Skin:     General: Skin is warm and dry.   Neurological:      General: No focal deficit present.      Mental Status: She is alert. Mental status is at baseline.         Assessment/Plan   Problem List Items Addressed This Visit    None  Visit Diagnoses       Acute bronchitis, unspecified organism    -  Primary    Relevant Medications    azithromycin (Zithromax) 250 mg tablet    predniSONE (Deltasone) 20 mg tablet    albuterol (Ventolin HFA) 90 mcg/actuation inhaler               Kerrie Fall MD

## 2023-12-21 NOTE — LETTER
December 21, 2023     Patient: Radha Corley   YOB: 1990   Date of Visit: 12/21/2023       To Whom It May Concern:    Radha Corley was seen in my clinic on 12/21/2023 at 11:40 am. Please excuse Radha for her absence from work 12/20-12/21.    If you have any questions or concerns, please don't hesitate to call.         Sincerely,         Kerrie Fall MD        CC: No Recipients

## 2023-12-21 NOTE — PROGRESS NOTES
Subjective   Patient ID: Radha Corley is a 33 y.o. female who presents for continuous cough. Was at [facility] 2X and did not have a xray. Been going on since 1 week.      HPI     Review of Systems    Objective   There were no vitals taken for this visit.    Physical Exam    Assessment/Plan

## 2024-01-04 ENCOUNTER — OFFICE VISIT (OUTPATIENT)
Dept: PRIMARY CARE | Facility: CLINIC | Age: 34
End: 2024-01-04
Payer: COMMERCIAL

## 2024-01-04 VITALS
SYSTOLIC BLOOD PRESSURE: 134 MMHG | HEIGHT: 64 IN | HEART RATE: 103 BPM | WEIGHT: 242.4 LBS | BODY MASS INDEX: 41.38 KG/M2 | DIASTOLIC BLOOD PRESSURE: 86 MMHG | OXYGEN SATURATION: 98 %

## 2024-01-04 DIAGNOSIS — E66.01 CLASS 3 SEVERE OBESITY WITHOUT SERIOUS COMORBIDITY WITH BODY MASS INDEX (BMI) OF 40.0 TO 44.9 IN ADULT, UNSPECIFIED OBESITY TYPE (MULTI): ICD-10-CM

## 2024-01-04 DIAGNOSIS — D50.9 IRON DEFICIENCY ANEMIA, UNSPECIFIED IRON DEFICIENCY ANEMIA TYPE: ICD-10-CM

## 2024-01-04 DIAGNOSIS — F31.9 BIPOLAR DEPRESSION (MULTI): ICD-10-CM

## 2024-01-04 DIAGNOSIS — I10 PRIMARY HYPERTENSION: Primary | ICD-10-CM

## 2024-01-04 PROCEDURE — 99214 OFFICE O/P EST MOD 30 MIN: CPT | Performed by: FAMILY MEDICINE

## 2024-01-04 PROCEDURE — 3079F DIAST BP 80-89 MM HG: CPT | Performed by: FAMILY MEDICINE

## 2024-01-04 PROCEDURE — 3008F BODY MASS INDEX DOCD: CPT | Performed by: FAMILY MEDICINE

## 2024-01-04 PROCEDURE — 3075F SYST BP GE 130 - 139MM HG: CPT | Performed by: FAMILY MEDICINE

## 2024-01-04 PROCEDURE — 4004F PT TOBACCO SCREEN RCVD TLK: CPT | Performed by: FAMILY MEDICINE

## 2024-01-04 NOTE — PATIENT INSTRUCTIONS
Continue current medications.  Follow up with specialists as scheduled.  Follow up in 3 months, sooner if needed.

## 2024-01-04 NOTE — PROGRESS NOTES
Subjective   Radha Corley is a 33 y.o. female who presents for No chief complaint on file..  Here for follow up headaches, HTN.  Since she was here last she ended up in the ER with worsening bronchitis and also had developed an abscess in the rectal area.  She was treated with antibiotics and inhaled steroids and is feeling better now.      She has labs done with her psychiatrist and has iron deficiency.  She states that she has had to have iron infusions in the past.      She is also having issues with her tongue burning and sore.      She is interested in trying to quit smoking.  We discussed medications - given her mental health history I recommend she check with her psychiatrist before trying chantix, she has not done well with wellbutrin in the past.  She may try the patches.             Objective   Visit Vitals  /86 (BP Location: Left arm, Patient Position: Sitting, BP Cuff Size: Adult)   Pulse 103      Physical Exam  Vitals reviewed.   Constitutional:       General: She is not in acute distress.  Cardiovascular:      Rate and Rhythm: Normal rate and regular rhythm.      Heart sounds: No murmur heard.  Pulmonary:      Effort: Pulmonary effort is normal. No respiratory distress.      Breath sounds: Normal breath sounds.   Skin:     General: Skin is warm and dry.   Neurological:      General: No focal deficit present.      Mental Status: She is alert. Mental status is at baseline.         Assessment/Plan   Problem List Items Addressed This Visit       Bipolar depression (CMS/HCC)    Relevant Orders    Follow Up In Primary Care - Established    Class 3 severe obesity without serious comorbidity with body mass index (BMI) of 40.0 to 44.9 in adult (CMS/HCC)    Relevant Orders    Follow Up In Primary Care - Established    Primary hypertension - Primary    Relevant Orders    Follow Up In Primary Care - Established     Other Visit Diagnoses       Iron deficiency anemia, unspecified iron deficiency anemia  type        Relevant Orders    Referral to Hematology and Oncology    Follow Up In Primary Care - Established               Kerrie Fall MD

## 2024-01-04 NOTE — PROGRESS NOTES
Subjective   Patient ID: Radha Corley is a 33 y.o. female who presents for 3 month follow up     HPI     Review of Systems    Objective   There were no vitals taken for this visit.    Physical Exam    Assessment/Plan

## 2024-01-17 DIAGNOSIS — I10 PRIMARY HYPERTENSION: ICD-10-CM

## 2024-01-17 RX ORDER — SPIRONOLACTONE 25 MG/1
25 TABLET ORAL DAILY
Qty: 30 TABLET | Refills: 5 | Status: SHIPPED | OUTPATIENT
Start: 2024-01-17

## 2024-01-17 NOTE — PROGRESS NOTES
Patient ID: Radha Corley is a 33 y.o. female.  Referring Physician: Kerrie Fall MD  [address] [facility] [place] [place] [address]  Primary Care Provider: Kerrie Fall MD  Visit Type: Initial Visit      Subjective    HPI    Review of Systems   Constitutional:  Positive for appetite change.   Gastrointestinal:  Positive for diarrhea and nausea (due to medications).   Skin: Negative.    Psychiatric/Behavioral:  Positive for depression. The patient is nervous/anxious.           Patient is a 32 yo HTN, bipolar, borderline personality disorder, ADHD, Autism, Vitamin D Defeincey with a PMH of  and was referred to [facility] hematology for consultation of iron deficiency.     Patient reports that she has struggled with iron deficiency for awhile. In the past she has required IV infusions. Patient's CBC on 10/28/23 results Hg 12.2, MCV 80, MCHC 31.3, indicating microcytic anemia, remainder of CBC are stable.  Iron parameters on 10/28/23 results were Fe 33, Fe sat 7%, Ferritin- no results reported- indicating iron deficiency. Patient was started on oral iron, she admits not always being compliant due to GI side effects.     Today, patient presents for initial consultation. Patient reports poor energy, able to work outside the home and complete ADLs, always feels fatigues. Appetite is poor due to tongue soreness, heaviness and burning sensation. Occasional nausea due to change in anxiety/depression medications, Denies any vomiting. Chronic diarrhea, no hematochezia or melenic stools.  No abdominal pain, cramping or early satiety. Denies any urinary issues, dysuria or hematuria.  Craves ice daily.  Denies abnormal weight loss, night sweats, fever. Recently treated for bronchitis and staph infection, resolved at this time.  Denies fatigue, chills, sob, early satiety. Denies any abnormal bleeding or bruising. No recurrent infections or lymphadenopathy. No bone pain. Denies  any brittle/dry/or hair loss, nails have always peeled and been soft/weak. No known blood disorders in family. Has had surgery in past w/o issue.     Patient reports started menses at age 10. Menstrual cycle is irregular, typically last 7 days with heavy bleeding and clotting. Patient is scheduled to see OB/GYN this week to discuss treatment options.     up to date on cancer screenings-     PAST MEDICAL HISTORY:  depression/anxiety, rosacea, allergic rhinitis, obesity    SOCIAL HISTORY:  Regular Diet  Smoker- 1/2 ppd cigarettes and marijuana - +1 year   ETOH- None  (3) pregnancies  she has 2 children  STNA    FAMILY HISTORY:  grandfather and 2 uncles had colon cancer, uncles were in their 50s  aunt had breast cancer, she was in her 40s  No other specific history of bleeding, clotting or malignant disorder in the       PAST SURGICAL HISTORY:    Expolarity surgery, two , tubal ligation, stent kidney stone, colonosopy/EGD, Cyst removal       Meds: see list       Objective   BSA: There is no height or weight on file to calculate BSA.  There were no vitals taken for this visit.     has a past medical history of Calculus of kidney (2021), Encounter for  delivery without indication, Encounter for gynecological examination (general) (routine) without abnormal findings, Other conditions influencing health status, Pain in unspecified hip (2021), Personal history of other complications of pregnancy, childbirth and the puerperium, Personal history of other diseases of urinary system (12/10/2020), Personal history of other specified conditions (12/10/2020), and Personal history of other specified conditions (2021).   has a past surgical history that includes Other surgical history (2022); Other surgical history (2021); and Other surgical history (2021).  Family History   Problem Relation Name Age of Onset    Depression Mother      Hypertension Mother      Hyperlipidemia Mother       Stroke Mother      Heart attack Mother      Anxiety disorder Mother      Fibromyalgia Mother      Parkinsonism Father      Heart disease Father      Coronary artery disease Father       Oncology History    No history exists.       Radha Corley  reports that she has been smoking cigarettes. She has never used smokeless tobacco.  She  reports that she does not currently use alcohol.  She  reports no history of drug use.    Physical Exam  Vitals and nursing note reviewed.   Constitutional:       General: She is not in acute distress.     Appearance: Normal appearance.   HENT:      Head: Normocephalic and atraumatic.      Mouth/Throat:      Mouth: Mucous membranes are moist.      Pharynx: Oropharynx is clear.   Eyes:      General: No scleral icterus.     Extraocular Movements: Extraocular movements intact.      Conjunctiva/sclera: Conjunctivae normal.   Cardiovascular:      Rate and Rhythm: Normal rate and regular rhythm.      Pulses: Normal pulses.      Heart sounds: Normal heart sounds. No murmur heard.  Pulmonary:      Effort: Pulmonary effort is normal. No respiratory distress.      Breath sounds: Normal breath sounds.   Abdominal:      General: Bowel sounds are normal. There is no distension.      Palpations: Abdomen is soft. There is no mass.      Tenderness: There is no abdominal tenderness.   Musculoskeletal:         General: No swelling. Normal range of motion.      Cervical back: Normal range of motion.      Comments: NO LAD   Skin:     General: Skin is warm and dry.   Neurological:      General: No focal deficit present.      Mental Status: She is alert and oriented to person, place, and time.   Psychiatric:         Mood and Affect: Mood normal.         Behavior: Behavior normal.         Thought Content: Thought content normal.         Judgment: Judgment normal.         REVIEW OF SYSTEMS:  Pertinent finding as per the history above. There are no additional specific  symptoms pertaining to eyes,  ENT, hematologic, lymphatic, neurological,  psychiatric, cardiac, pulmonary, GI, , endocrine, rheumatic, dermatological,  or musculoskeletal systems. All other systems have been reviewed and generally  negative and noncontributory.    WBC   Date/Time Value Ref Range Status   10/28/2023 10:36 AM 8.6 4.4 - 11.3 x10*3/uL Final   06/09/2023 08:53 AM 13.0 (H) 4.4 - 11.3 x10E9/L Final   04/06/2023 08:56 AM 10.5 4.4 - 11.3 x10E9/L Final   03/31/2023 08:50 AM 10.7 4.4 - 11.3 x10E9/L Final     nRBC   Date Value Ref Range Status   10/28/2023 0.0 0.0 - 0.0 /100 WBCs Final   12/08/2020 0.1 /100 WBC Final     RBC   Date Value Ref Range Status   10/28/2023 4.84 4.00 - 5.20 x10*6/uL Final   06/09/2023 4.93 4.00 - 5.20 x10E12/L Final   04/06/2023 4.90 4.00 - 5.20 x10E12/L Final   03/31/2023 4.77 4.00 - 5.20 x10E12/L Final     Hemoglobin   Date Value Ref Range Status   10/28/2023 12.2 12.0 - 16.0 g/dL Final   06/09/2023 12.7 12.0 - 16.0 g/dL Final   04/06/2023 13.2 12.0 - 16.0 g/dL Final   03/31/2023 12.6 12.0 - 16.0 g/dL Final     Hematocrit   Date Value Ref Range Status   10/28/2023 38.5 36.0 - 46.0 % Final   06/09/2023 40.6 36.0 - 46.0 % Final   04/06/2023 41.1 36.0 - 46.0 % Final   03/31/2023 40.0 36.0 - 46.0 % Final     MCV   Date/Time Value Ref Range Status   10/28/2023 10:36 AM 80 80 - 100 fL Final   06/09/2023 08:53 AM 82 80 - 100 fL Final   04/06/2023 08:56 AM 84 80 - 100 fL Final   03/31/2023 08:50 AM 84 80 - 100 fL Final     MCH   Date/Time Value Ref Range Status   10/28/2023 10:36 AM 25.2 (L) 26.0 - 34.0 pg Final     MCHC   Date/Time Value Ref Range Status   10/28/2023 10:36 AM 31.7 (L) 32.0 - 36.0 g/dL Final   06/09/2023 08:53 AM 31.3 (L) 32.0 - 36.0 g/dL Final   04/06/2023 08:56 AM 32.1 32.0 - 36.0 g/dL Final   03/31/2023 08:50 AM 31.5 (L) 32.0 - 36.0 g/dL Final     RDW   Date/Time Value Ref Range Status   10/28/2023 10:36 AM 15.3 (H) 11.5 - 14.5 % Final   06/09/2023 08:53 AM 14.6 (H) 11.5 - 14.5 % Final   04/06/2023  08:56 AM 14.0 11.5 - 14.5 % Final   03/31/2023 08:50 AM 13.8 11.5 - 14.5 % Final     Platelets   Date/Time Value Ref Range Status   10/28/2023 10:36  150 - 450 x10*3/uL Final   06/09/2023 08:53  150 - 450 x10E9/L Final   04/06/2023 08:56  150 - 450 x10E9/L Final   03/31/2023 08:50  150 - 450 x10E9/L Final     MPV   Date/Time Value Ref Range Status   10/28/2023 10:36 AM 8.3 7.5 - 11.5 fL Final     Neutrophils %   Date/Time Value Ref Range Status   10/28/2023 10:36 AM 55.5 40.0 - 80.0 % Final   06/09/2023 08:53 AM 66.2 40.0 - 80.0 % Final   04/06/2023 08:56 AM 64.0 40.0 - 80.0 % Final   03/31/2023 08:50 AM 63.8 40.0 - 80.0 % Final     Immature Granulocytes %, Automated   Date/Time Value Ref Range Status   10/28/2023 10:36 AM 0.3 0.0 - 0.9 % Final     Comment:     Immature Granulocyte Count (IG) includes promyelocytes, myelocytes and metamyelocytes but does not include bands. Percent differential counts (%) should be interpreted in the context of the absolute cell counts (cells/UL).   06/09/2023 08:53 AM 0.4 0.0 - 0.9 % Final     Comment:      Immature Granulocyte Count (IG) includes promyelocytes,    myelocytes and metamyelocytes but does not include bands.   Percent differential counts (%) should be interpreted in the   context of the absolute cell counts (cells/L).   04/06/2023 08:56 AM 0.3 0.0 - 0.9 % Final     Comment:      Immature Granulocyte Count (IG) includes promyelocytes,    myelocytes and metamyelocytes but does not include bands.   Percent differential counts (%) should be interpreted in the   context of the absolute cell counts (cells/L).     03/31/2023 08:50 AM 0.3 0.0 - 0.9 % Final     Comment:      Immature Granulocyte Count (IG) includes promyelocytes,    myelocytes and metamyelocytes but does not include bands.   Percent differential counts (%) should be interpreted in the   context of the absolute cell counts (cells/L).       Lymphocytes %   Date/Time Value Ref Range Status    10/28/2023 10:36 AM 32.1 13.0 - 44.0 % Final   06/09/2023 08:53 AM 22.9 13.0 - 44.0 % Final   04/06/2023 08:56 AM 24.7 13.0 - 44.0 % Final   03/31/2023 08:50 AM 23.5 13.0 - 44.0 % Final     Monocytes %   Date/Time Value Ref Range Status   10/28/2023 10:36 AM 5.1 2.0 - 10.0 % Final   06/09/2023 08:53 AM 4.2 2.0 - 10.0 % Final   04/06/2023 08:56 AM 3.5 2.0 - 10.0 % Final   03/31/2023 08:50 AM 4.0 2.0 - 10.0 % Final     Eosinophils %   Date/Time Value Ref Range Status   10/28/2023 10:36 AM 6.4 0.0 - 6.0 % Final   06/09/2023 08:53 AM 5.8 0.0 - 6.0 % Final   04/06/2023 08:56 AM 6.9 0.0 - 6.0 % Final   03/31/2023 08:50 AM 7.7 0.0 - 6.0 % Final     Basophils %   Date/Time Value Ref Range Status   10/28/2023 10:36 AM 0.6 0.0 - 2.0 % Final   06/09/2023 08:53 AM 0.5 0.0 - 2.0 % Final   04/06/2023 08:56 AM 0.6 0.0 - 2.0 % Final   03/31/2023 08:50 AM 0.7 0.0 - 2.0 % Final     Neutrophils Absolute   Date/Time Value Ref Range Status   10/28/2023 10:36 AM 4.76 1.20 - 7.70 x10*3/uL Final     Comment:     Percent differential counts (%) should be interpreted in the context of the absolute cell counts (cells/uL).   06/09/2023 08:53 AM 8.61 (H) 1.20 - 7.70 x10E9/L Final     Comment:      Percent differential counts (%) should be interpreted in the   context of the absolute cell counts (cells/L).   04/06/2023 08:56 AM 6.75 1.20 - 7.70 x10E9/L Final     Comment:      Percent differential counts (%) should be interpreted in the   context of the absolute cell counts (cells/L).     03/31/2023 08:50 AM 6.84 1.20 - 7.70 x10E9/L Final     Comment:      Percent differential counts (%) should be interpreted in the   context of the absolute cell counts (cells/L).       Immature Granulocytes Absolute, Automated   Date/Time Value Ref Range Status   10/28/2023 10:36 AM 0.03 0.00 - 0.70 x10*3/uL Final     Lymphocytes Absolute   Date/Time Value Ref Range Status   10/28/2023 10:36 AM 2.76 1.20 - 4.80 x10*3/uL Final   06/09/2023 08:53 AM 2.98 1.20 -  "4.80 x10E9/L Final   04/06/2023 08:56 AM 2.60 1.20 - 4.80 x10E9/L Final   03/31/2023 08:50 AM 2.52 1.20 - 4.80 x10E9/L Final     Monocytes Absolute   Date/Time Value Ref Range Status   10/28/2023 10:36 AM 0.44 0.10 - 1.00 x10*3/uL Final   06/09/2023 08:53 AM 0.54 0.10 - 1.00 x10E9/L Final   04/06/2023 08:56 AM 0.37 0.10 - 1.00 x10E9/L Final   03/31/2023 08:50 AM 0.43 0.10 - 1.00 x10E9/L Final     Eosinophils Absolute   Date/Time Value Ref Range Status   10/28/2023 10:36 AM 0.55 0.00 - 0.70 x10*3/uL Final   06/09/2023 08:53 AM 0.76 (H) 0.00 - 0.70 x10E9/L Final   04/06/2023 08:56 AM 0.73 (H) 0.00 - 0.70 x10E9/L Final   03/31/2023 08:50 AM 0.83 (H) 0.00 - 0.70 x10E9/L Final     Basophils Absolute   Date/Time Value Ref Range Status   10/28/2023 10:36 AM 0.05 0.00 - 0.10 x10*3/uL Final   06/09/2023 08:53 AM 0.06 0.00 - 0.10 x10E9/L Final   04/06/2023 08:56 AM 0.06 0.00 - 0.10 x10E9/L Final   03/31/2023 08:50 AM 0.07 0.00 - 0.10 x10E9/L Final       No components found for: \"PT\"  No results found for: \"APTT\"    Assessment/Plan       1. Microcytic anemia  most likely iron deficiency anemia secondary to menstrual blood losses  she follows Maritza Olivarez, OB/gyn  will plan to verify iron deficiency status with ferritin, iron, TIBC levels  check additional anemia labs:  -B12 and folic acid  -LDH, retic and haptoglobin  - ZINA, Rh factor  -TSH    Plan for IV iron replacement if iron deficiency confirmed, she has already GI  complaints and would like to avoid any potential exacerbation with PO iron    Patient was provided educaton on IV iron without premedication, observation for hypersensitivity reaction for 30 minutes post infusion. Side effects of Feraheme were explained, including but are not limited to hypotension, edema, chest pain, hypertension, dizziness, headache, fatigue, pruritus, rash, diarrhea, nausea, constipation, vomiting, abdominal pain, hypersensitivity reaction which can be severe and potentially life-threatening, " pain, muscle spasm, shortness of breath, cough, fever, anaphylaxis, angioedema, arrhythmia, cardiac failure, ischemic heart disease, syncope, urticaria-patient agrees to proceed if needed.    2. Anxiety/ Depression   Follows psychiatry    3. Diarrhea  Advised follow-up with GI as needed       RTC 4 weeks for results and for IV iron prior if needed       SANTOS Moise-CNP      I had an extensive discussion with the patient regarding the diagnosis and discussed the plan of therapy, including general considerations regarding side effects and outcomes. Pt understood and gave appropriate teach back about the plan of care. All questions were answered to the patient's satisfaction. The patient is instructed to contact us at any time if questions or problems arise. Thank you for the opportunity to participate in the care of this very pleasant patient.    Total time =60 minutes. 50% or more of this time was spent in counseling and/or coordination of care including reviewing medical history/radiology/labs, examining patient, formulating outlined plan with team, and discussing plan with patient/family.

## 2024-01-18 ENCOUNTER — OFFICE VISIT (OUTPATIENT)
Dept: HEMATOLOGY/ONCOLOGY | Facility: CLINIC | Age: 34
End: 2024-01-18
Payer: COMMERCIAL

## 2024-01-18 VITALS
HEART RATE: 78 BPM | OXYGEN SATURATION: 96 % | SYSTOLIC BLOOD PRESSURE: 119 MMHG | TEMPERATURE: 93.9 F | WEIGHT: 243.8 LBS | RESPIRATION RATE: 20 BRPM | DIASTOLIC BLOOD PRESSURE: 87 MMHG | BODY MASS INDEX: 41.85 KG/M2

## 2024-01-18 DIAGNOSIS — D50.9 IRON DEFICIENCY ANEMIA, UNSPECIFIED IRON DEFICIENCY ANEMIA TYPE: ICD-10-CM

## 2024-01-18 DIAGNOSIS — R20.8 BURNING SENSATION OF MOUTH: Primary | ICD-10-CM

## 2024-01-18 LAB
ALBUMIN SERPL BCP-MCNC: 4.4 G/DL (ref 3.4–5)
ALP SERPL-CCNC: 70 U/L (ref 33–110)
ALT SERPL W P-5'-P-CCNC: 26 U/L (ref 7–45)
ANION GAP SERPL CALC-SCNC: 15 MMOL/L (ref 10–20)
AST SERPL W P-5'-P-CCNC: 17 U/L (ref 9–39)
BASOPHILS # BLD AUTO: 0.06 X10*3/UL (ref 0–0.1)
BASOPHILS NFR BLD AUTO: 0.6 %
BILIRUB SERPL-MCNC: 0.4 MG/DL (ref 0–1.2)
BUN SERPL-MCNC: 16 MG/DL (ref 6–23)
CALCIUM SERPL-MCNC: 9.5 MG/DL (ref 8.6–10.3)
CHLORIDE SERPL-SCNC: 103 MMOL/L (ref 98–107)
CO2 SERPL-SCNC: 25 MMOL/L (ref 21–32)
CREAT SERPL-MCNC: 0.93 MG/DL (ref 0.5–1.05)
EGFRCR SERPLBLD CKD-EPI 2021: 84 ML/MIN/1.73M*2
EOSINOPHIL # BLD AUTO: 0.52 X10*3/UL (ref 0–0.7)
EOSINOPHIL NFR BLD AUTO: 5.3 %
ERYTHROCYTE [DISTWIDTH] IN BLOOD BY AUTOMATED COUNT: 15.9 % (ref 11.5–14.5)
FERRITIN SERPL-MCNC: 18 NG/ML (ref 8–150)
GLUCOSE SERPL-MCNC: 99 MG/DL (ref 74–99)
HCT VFR BLD AUTO: 38.2 % (ref 36–46)
HGB BLD-MCNC: 12.3 G/DL (ref 12–16)
HGB RETIC QN: 26 PG (ref 28–38)
IMM GRANULOCYTES # BLD AUTO: 0.03 X10*3/UL (ref 0–0.7)
IMM GRANULOCYTES NFR BLD AUTO: 0.3 % (ref 0–0.9)
IMMATURE RETIC FRACTION: 16.9 %
IRON SATN MFR SERPL: 8 % (ref 25–45)
IRON SERPL-MCNC: 37 UG/DL (ref 35–150)
LDH SERPL L TO P-CCNC: 148 U/L (ref 84–246)
LYMPHOCYTES # BLD AUTO: 2.79 X10*3/UL (ref 1.2–4.8)
LYMPHOCYTES NFR BLD AUTO: 28.7 %
MCH RBC QN AUTO: 24.6 PG (ref 26–34)
MCHC RBC AUTO-ENTMCNC: 32.2 G/DL (ref 32–36)
MCV RBC AUTO: 76 FL (ref 80–100)
MONOCYTES # BLD AUTO: 0.6 X10*3/UL (ref 0.1–1)
MONOCYTES NFR BLD AUTO: 6.2 %
NEUTROPHILS # BLD AUTO: 5.73 X10*3/UL (ref 1.2–7.7)
NEUTROPHILS NFR BLD AUTO: 58.9 %
NRBC BLD-RTO: 0 /100 WBCS (ref 0–0)
PLATELET # BLD AUTO: 432 X10*3/UL (ref 150–450)
POTASSIUM SERPL-SCNC: 4.6 MMOL/L (ref 3.5–5.3)
PROT SERPL-MCNC: 6.9 G/DL (ref 6.4–8.2)
RBC # BLD AUTO: 5.01 X10*6/UL (ref 4–5.2)
RETICS #: 0.09 X10*6/UL (ref 0.02–0.08)
RETICS/RBC NFR AUTO: 1.8 % (ref 0.5–2)
RHEUMATOID FACT SER NEPH-ACNC: <10 IU/ML (ref 0–15)
SODIUM SERPL-SCNC: 138 MMOL/L (ref 136–145)
TIBC SERPL-MCNC: 472 UG/DL (ref 240–445)
TSH SERPL-ACNC: 1.04 MIU/L (ref 0.44–3.98)
UIBC SERPL-MCNC: 435 UG/DL (ref 110–370)
VIT B12 SERPL-MCNC: 327 PG/ML (ref 211–911)
WBC # BLD AUTO: 9.7 X10*3/UL (ref 4.4–11.3)

## 2024-01-18 PROCEDURE — 3079F DIAST BP 80-89 MM HG: CPT

## 2024-01-18 PROCEDURE — 83615 LACTATE (LD) (LDH) ENZYME: CPT

## 2024-01-18 PROCEDURE — 99215 OFFICE O/P EST HI 40 MIN: CPT

## 2024-01-18 PROCEDURE — 86431 RHEUMATOID FACTOR QUANT: CPT | Mod: SAMLAB

## 2024-01-18 PROCEDURE — 86038 ANTINUCLEAR ANTIBODIES: CPT | Mod: SAMLAB

## 2024-01-18 PROCEDURE — 3074F SYST BP LT 130 MM HG: CPT

## 2024-01-18 PROCEDURE — 3008F BODY MASS INDEX DOCD: CPT

## 2024-01-18 PROCEDURE — 85045 AUTOMATED RETICULOCYTE COUNT: CPT

## 2024-01-18 PROCEDURE — 82728 ASSAY OF FERRITIN: CPT

## 2024-01-18 PROCEDURE — 4004F PT TOBACCO SCREEN RCVD TLK: CPT

## 2024-01-18 PROCEDURE — 82607 VITAMIN B-12: CPT

## 2024-01-18 PROCEDURE — 83540 ASSAY OF IRON: CPT

## 2024-01-18 PROCEDURE — 84443 ASSAY THYROID STIM HORMONE: CPT

## 2024-01-18 PROCEDURE — 80053 COMPREHEN METABOLIC PANEL: CPT

## 2024-01-18 PROCEDURE — 83010 ASSAY OF HAPTOGLOBIN QUANT: CPT

## 2024-01-18 PROCEDURE — 85025 COMPLETE CBC W/AUTO DIFF WBC: CPT

## 2024-01-18 RX ORDER — PAROXETINE HYDROCHLORIDE 20 MG/1
20 TABLET, FILM COATED ORAL EVERY MORNING
COMMUNITY

## 2024-01-18 ASSESSMENT — PAIN SCALES - GENERAL: PAINLEVEL: 0-NO PAIN

## 2024-01-19 PROBLEM — D50.9 IRON DEFICIENCY ANEMIA: Status: ACTIVE | Noted: 2024-01-19

## 2024-01-19 LAB
ANA SER QL HEP2 SUBST: NEGATIVE
HAPTOGLOB SERPL-MCNC: 199 MG/DL (ref 37–246)

## 2024-01-19 RX ORDER — ALBUTEROL SULFATE 0.83 MG/ML
3 SOLUTION RESPIRATORY (INHALATION) AS NEEDED
Status: CANCELLED | OUTPATIENT
Start: 2024-01-19

## 2024-01-19 RX ORDER — EPINEPHRINE 0.3 MG/.3ML
0.3 INJECTION SUBCUTANEOUS EVERY 5 MIN PRN
Status: CANCELLED | OUTPATIENT
Start: 2024-01-19

## 2024-01-19 RX ORDER — DIPHENHYDRAMINE HYDROCHLORIDE 50 MG/ML
50 INJECTION INTRAMUSCULAR; INTRAVENOUS AS NEEDED
Status: CANCELLED | OUTPATIENT
Start: 2024-01-19

## 2024-01-19 RX ORDER — FAMOTIDINE 10 MG/ML
20 INJECTION INTRAVENOUS ONCE AS NEEDED
Status: CANCELLED | OUTPATIENT
Start: 2024-01-19

## 2024-01-19 RX ORDER — HEPARIN SODIUM,PORCINE/PF 10 UNIT/ML
50 SYRINGE (ML) INTRAVENOUS AS NEEDED
Status: CANCELLED | OUTPATIENT
Start: 2024-01-19

## 2024-01-19 RX ORDER — HEPARIN 100 UNIT/ML
500 SYRINGE INTRAVENOUS AS NEEDED
Status: CANCELLED | OUTPATIENT
Start: 2024-01-19

## 2024-01-23 ASSESSMENT — ENCOUNTER SYMPTOMS
APPETITE CHANGE: 1
DIARRHEA: 1
NERVOUS/ANXIOUS: 1
DEPRESSION: 1
NAUSEA: 1

## 2024-01-24 ENCOUNTER — TELEPHONE (OUTPATIENT)
Dept: HEMATOLOGY/ONCOLOGY | Facility: CLINIC | Age: 34
End: 2024-01-24
Payer: COMMERCIAL

## 2024-01-25 ENCOUNTER — OFFICE VISIT (OUTPATIENT)
Dept: OBSTETRICS AND GYNECOLOGY | Facility: CLINIC | Age: 34
End: 2024-01-25
Payer: COMMERCIAL

## 2024-01-25 ENCOUNTER — INFUSION (OUTPATIENT)
Dept: HEMATOLOGY/ONCOLOGY | Facility: CLINIC | Age: 34
End: 2024-01-25
Payer: COMMERCIAL

## 2024-01-25 VITALS
HEIGHT: 64 IN | DIASTOLIC BLOOD PRESSURE: 72 MMHG | WEIGHT: 241.6 LBS | BODY MASS INDEX: 41.25 KG/M2 | SYSTOLIC BLOOD PRESSURE: 118 MMHG

## 2024-01-25 VITALS
RESPIRATION RATE: 16 BRPM | WEIGHT: 241.62 LBS | DIASTOLIC BLOOD PRESSURE: 77 MMHG | HEART RATE: 87 BPM | OXYGEN SATURATION: 98 % | TEMPERATURE: 96.4 F | BODY MASS INDEX: 41.47 KG/M2 | SYSTOLIC BLOOD PRESSURE: 115 MMHG

## 2024-01-25 DIAGNOSIS — D50.9 IRON DEFICIENCY ANEMIA, UNSPECIFIED IRON DEFICIENCY ANEMIA TYPE: ICD-10-CM

## 2024-01-25 DIAGNOSIS — N94.6 PAINFUL MENSTRUAL PERIODS: Primary | ICD-10-CM

## 2024-01-25 PROCEDURE — 3008F BODY MASS INDEX DOCD: CPT | Performed by: REGISTERED NURSE

## 2024-01-25 PROCEDURE — 2500000004 HC RX 250 GENERAL PHARMACY W/ HCPCS (ALT 636 FOR OP/ED)

## 2024-01-25 PROCEDURE — 99213 OFFICE O/P EST LOW 20 MIN: CPT | Performed by: REGISTERED NURSE

## 2024-01-25 PROCEDURE — 3074F SYST BP LT 130 MM HG: CPT | Performed by: REGISTERED NURSE

## 2024-01-25 PROCEDURE — 4004F PT TOBACCO SCREEN RCVD TLK: CPT | Performed by: REGISTERED NURSE

## 2024-01-25 PROCEDURE — 3078F DIAST BP <80 MM HG: CPT | Performed by: REGISTERED NURSE

## 2024-01-25 PROCEDURE — 96365 THER/PROPH/DIAG IV INF INIT: CPT

## 2024-01-25 RX ORDER — HEPARIN SODIUM,PORCINE/PF 10 UNIT/ML
50 SYRINGE (ML) INTRAVENOUS AS NEEDED
Status: CANCELLED | OUTPATIENT
Start: 2024-01-25

## 2024-01-25 RX ORDER — ALBUTEROL SULFATE 0.83 MG/ML
3 SOLUTION RESPIRATORY (INHALATION) AS NEEDED
Status: CANCELLED | OUTPATIENT
Start: 2024-01-27

## 2024-01-25 RX ORDER — FAMOTIDINE 10 MG/ML
20 INJECTION INTRAVENOUS ONCE AS NEEDED
Status: CANCELLED | OUTPATIENT
Start: 2024-01-27

## 2024-01-25 RX ORDER — EPINEPHRINE 0.3 MG/.3ML
0.3 INJECTION SUBCUTANEOUS EVERY 5 MIN PRN
Status: CANCELLED | OUTPATIENT
Start: 2024-01-27

## 2024-01-25 RX ORDER — DIPHENHYDRAMINE HYDROCHLORIDE 50 MG/ML
50 INJECTION INTRAMUSCULAR; INTRAVENOUS AS NEEDED
Status: CANCELLED | OUTPATIENT
Start: 2024-01-27

## 2024-01-25 RX ORDER — HEPARIN 100 UNIT/ML
500 SYRINGE INTRAVENOUS AS NEEDED
Status: CANCELLED | OUTPATIENT
Start: 2024-01-25

## 2024-01-25 RX ADMIN — IRON SUCROSE 200 MG: 20 INJECTION, SOLUTION INTRAVENOUS at 08:03

## 2024-01-25 ASSESSMENT — ENCOUNTER SYMPTOMS
PSYCHIATRIC NEGATIVE: 1
CONSTITUTIONAL NEGATIVE: 1
RESPIRATORY NEGATIVE: 1

## 2024-01-25 NOTE — PROGRESS NOTES
Subjective   Patient ID: Radha Corley is a 33 y.o. female who presents for Menstrual Problem (Patient is here due to irregular very heavy periods lasting 2 weeks. Patient states she bleeds thru a super tampon in 30 minutes. Patient also c/o lower right pelvic pain between her periods. Patient is also currently receiving iron infusions. ).  HPI  Pt. Presents with complaint of lifetime of heavy painful periods and more recently with intermittent cramping pain between periods for the past several weeks. Pt. Currently taking iron infusions and states PCP attributes this to heavy menses. Pt. With hx of tubal ligation after last birth, tried IUD and had to have it removed under D+C, does not want hormones as mood is currently stabilized on bipolar meds. Significant family hx of endometriosis. Due for annual exam  Review of Systems   Constitutional: Negative.    Respiratory: Negative.     Genitourinary:  Positive for menstrual problem.   Psychiatric/Behavioral: Negative.         Objective   Physical Exam  Constitutional:       Appearance: Normal appearance.   Pulmonary:      Effort: Pulmonary effort is normal.   Neurological:      General: No focal deficit present.      Mental Status: She is alert and oriented to person, place, and time.   Psychiatric:         Mood and Affect: Mood normal.         Behavior: Behavior normal.         Assessment/Plan        Disc. Mgmt options and pt. Interested in ablation. Aware that this could resolve bleeding but would not necessarily affect mood or other pre-menstrual sx.   Schedule consult with MD DUGAN for annual exam    Maritza Corona, SARY, SANTOS-CNP, DNP 01/25/24 9:58 AM

## 2024-01-30 ENCOUNTER — INFUSION (OUTPATIENT)
Dept: HEMATOLOGY/ONCOLOGY | Facility: CLINIC | Age: 34
End: 2024-01-30
Payer: COMMERCIAL

## 2024-01-30 VITALS
WEIGHT: 244.27 LBS | SYSTOLIC BLOOD PRESSURE: 113 MMHG | RESPIRATION RATE: 16 BRPM | BODY MASS INDEX: 41.93 KG/M2 | OXYGEN SATURATION: 95 % | HEART RATE: 76 BPM | TEMPERATURE: 95.7 F | DIASTOLIC BLOOD PRESSURE: 71 MMHG

## 2024-01-30 DIAGNOSIS — D50.9 IRON DEFICIENCY ANEMIA, UNSPECIFIED IRON DEFICIENCY ANEMIA TYPE: ICD-10-CM

## 2024-01-30 PROCEDURE — 96365 THER/PROPH/DIAG IV INF INIT: CPT

## 2024-01-30 PROCEDURE — 2500000004 HC RX 250 GENERAL PHARMACY W/ HCPCS (ALT 636 FOR OP/ED)

## 2024-01-30 RX ORDER — ALBUTEROL SULFATE 0.83 MG/ML
3 SOLUTION RESPIRATORY (INHALATION) AS NEEDED
Status: CANCELLED | OUTPATIENT
Start: 2024-01-31

## 2024-01-30 RX ORDER — DIPHENHYDRAMINE HYDROCHLORIDE 50 MG/ML
50 INJECTION INTRAMUSCULAR; INTRAVENOUS AS NEEDED
Status: CANCELLED | OUTPATIENT
Start: 2024-01-31

## 2024-01-30 RX ORDER — FAMOTIDINE 10 MG/ML
20 INJECTION INTRAVENOUS ONCE AS NEEDED
Status: CANCELLED | OUTPATIENT
Start: 2024-01-31

## 2024-01-30 RX ORDER — EPINEPHRINE 0.3 MG/.3ML
0.3 INJECTION SUBCUTANEOUS EVERY 5 MIN PRN
Status: CANCELLED | OUTPATIENT
Start: 2024-01-31

## 2024-01-30 RX ORDER — HEPARIN 100 UNIT/ML
500 SYRINGE INTRAVENOUS AS NEEDED
Status: CANCELLED | OUTPATIENT
Start: 2024-01-30

## 2024-01-30 RX ORDER — HEPARIN SODIUM,PORCINE/PF 10 UNIT/ML
50 SYRINGE (ML) INTRAVENOUS AS NEEDED
Status: CANCELLED | OUTPATIENT
Start: 2024-01-30

## 2024-01-30 RX ADMIN — IRON SUCROSE 200 MG: 20 INJECTION, SOLUTION INTRAVENOUS at 08:18

## 2024-01-30 ASSESSMENT — PAIN SCALES - GENERAL: PAINLEVEL: 0-NO PAIN

## 2024-02-01 ENCOUNTER — INFUSION (OUTPATIENT)
Dept: HEMATOLOGY/ONCOLOGY | Facility: CLINIC | Age: 34
End: 2024-02-01
Payer: COMMERCIAL

## 2024-02-01 ENCOUNTER — APPOINTMENT (OUTPATIENT)
Dept: OBSTETRICS AND GYNECOLOGY | Facility: CLINIC | Age: 34
End: 2024-02-01
Payer: COMMERCIAL

## 2024-02-01 VITALS
RESPIRATION RATE: 18 BRPM | DIASTOLIC BLOOD PRESSURE: 80 MMHG | WEIGHT: 243.39 LBS | BODY MASS INDEX: 41.78 KG/M2 | TEMPERATURE: 96.6 F | HEART RATE: 71 BPM | SYSTOLIC BLOOD PRESSURE: 120 MMHG | OXYGEN SATURATION: 98 %

## 2024-02-01 DIAGNOSIS — D50.9 IRON DEFICIENCY ANEMIA, UNSPECIFIED IRON DEFICIENCY ANEMIA TYPE: ICD-10-CM

## 2024-02-01 PROCEDURE — 2500000004 HC RX 250 GENERAL PHARMACY W/ HCPCS (ALT 636 FOR OP/ED)

## 2024-02-01 PROCEDURE — 96365 THER/PROPH/DIAG IV INF INIT: CPT

## 2024-02-01 RX ORDER — FAMOTIDINE 10 MG/ML
20 INJECTION INTRAVENOUS ONCE AS NEEDED
Status: CANCELLED | OUTPATIENT
Start: 2024-02-03

## 2024-02-01 RX ORDER — ALBUTEROL SULFATE 0.83 MG/ML
3 SOLUTION RESPIRATORY (INHALATION) AS NEEDED
Status: CANCELLED | OUTPATIENT
Start: 2024-02-03

## 2024-02-01 RX ORDER — HEPARIN SODIUM,PORCINE/PF 10 UNIT/ML
50 SYRINGE (ML) INTRAVENOUS AS NEEDED
Status: CANCELLED | OUTPATIENT
Start: 2024-02-01

## 2024-02-01 RX ORDER — EPINEPHRINE 0.3 MG/.3ML
0.3 INJECTION SUBCUTANEOUS EVERY 5 MIN PRN
Status: CANCELLED | OUTPATIENT
Start: 2024-02-03

## 2024-02-01 RX ORDER — DIPHENHYDRAMINE HYDROCHLORIDE 50 MG/ML
50 INJECTION INTRAMUSCULAR; INTRAVENOUS AS NEEDED
Status: CANCELLED | OUTPATIENT
Start: 2024-02-03

## 2024-02-01 RX ORDER — HEPARIN 100 UNIT/ML
500 SYRINGE INTRAVENOUS AS NEEDED
Status: CANCELLED | OUTPATIENT
Start: 2024-02-01

## 2024-02-01 RX ADMIN — IRON SUCROSE 200 MG: 20 INJECTION, SOLUTION INTRAVENOUS at 08:14

## 2024-02-01 ASSESSMENT — PAIN SCALES - GENERAL: PAINLEVEL: 2

## 2024-02-05 ENCOUNTER — OFFICE VISIT (OUTPATIENT)
Dept: HEMATOLOGY/ONCOLOGY | Facility: CLINIC | Age: 34
End: 2024-02-05
Payer: COMMERCIAL

## 2024-02-05 ENCOUNTER — INFUSION (OUTPATIENT)
Dept: HEMATOLOGY/ONCOLOGY | Facility: CLINIC | Age: 34
End: 2024-02-05
Payer: COMMERCIAL

## 2024-02-05 VITALS
DIASTOLIC BLOOD PRESSURE: 68 MMHG | BODY MASS INDEX: 41.2 KG/M2 | TEMPERATURE: 97.6 F | HEART RATE: 88 BPM | SYSTOLIC BLOOD PRESSURE: 109 MMHG | WEIGHT: 240 LBS | RESPIRATION RATE: 16 BRPM | OXYGEN SATURATION: 96 %

## 2024-02-05 DIAGNOSIS — R39.9 URINARY SYMPTOM OR SIGN: Primary | ICD-10-CM

## 2024-02-05 DIAGNOSIS — R20.8 BURNING SENSATION OF MOUTH: ICD-10-CM

## 2024-02-05 DIAGNOSIS — D50.9 IRON DEFICIENCY ANEMIA, UNSPECIFIED IRON DEFICIENCY ANEMIA TYPE: ICD-10-CM

## 2024-02-05 PROCEDURE — 3008F BODY MASS INDEX DOCD: CPT

## 2024-02-05 PROCEDURE — 99214 OFFICE O/P EST MOD 30 MIN: CPT

## 2024-02-05 PROCEDURE — 2500000004 HC RX 250 GENERAL PHARMACY W/ HCPCS (ALT 636 FOR OP/ED)

## 2024-02-05 PROCEDURE — 96365 THER/PROPH/DIAG IV INF INIT: CPT

## 2024-02-05 PROCEDURE — 4004F PT TOBACCO SCREEN RCVD TLK: CPT

## 2024-02-05 RX ORDER — EPINEPHRINE 0.3 MG/.3ML
0.3 INJECTION SUBCUTANEOUS EVERY 5 MIN PRN
Status: CANCELLED | OUTPATIENT
Start: 2024-02-07

## 2024-02-05 RX ORDER — HEPARIN 100 UNIT/ML
500 SYRINGE INTRAVENOUS AS NEEDED
Status: CANCELLED | OUTPATIENT
Start: 2024-02-05

## 2024-02-05 RX ORDER — FAMOTIDINE 10 MG/ML
20 INJECTION INTRAVENOUS ONCE AS NEEDED
Status: CANCELLED | OUTPATIENT
Start: 2024-02-07

## 2024-02-05 RX ORDER — HEPARIN SODIUM,PORCINE/PF 10 UNIT/ML
50 SYRINGE (ML) INTRAVENOUS AS NEEDED
Status: CANCELLED | OUTPATIENT
Start: 2024-02-05

## 2024-02-05 RX ORDER — DIPHENHYDRAMINE HYDROCHLORIDE 50 MG/ML
50 INJECTION INTRAMUSCULAR; INTRAVENOUS AS NEEDED
Status: CANCELLED | OUTPATIENT
Start: 2024-02-07

## 2024-02-05 RX ORDER — ALBUTEROL SULFATE 0.83 MG/ML
3 SOLUTION RESPIRATORY (INHALATION) AS NEEDED
Status: CANCELLED | OUTPATIENT
Start: 2024-02-07

## 2024-02-05 RX ADMIN — IRON SUCROSE 200 MG: 20 INJECTION, SOLUTION INTRAVENOUS at 13:34

## 2024-02-05 NOTE — PROGRESS NOTES
Patient ID: Radha Corley is a 33 y.o. female.    Subjective    HPI       Patient is a 34 yo HTN, bipolar, borderline personality disorder, ADHD, Autism, Vitamin D Defeincey with a PMH of  and was referred to benign hematology for consultation of iron deficiency.      Patient reports that she has struggled with iron deficiency for awhile. In the past she has required IV infusions. Patient's CBC on 10/28/23 results Hg 12.2, MCV 80, MCHC 31.3, indicating microcytic anemia, remainder of CBC are stable.  Iron parameters on 10/28/23 results were Fe 33, Fe sat 7%, Ferritin- no results reported- indicating iron deficiency. Patient was started on oral iron, she admits not always being compliant due to GI side effects.      Today, patient presents for initial consultation. Patient reports poor energy, able to work outside the home and complete ADLs, always feels fatigues. Appetite is poor due to tongue soreness, heaviness and burning sensation. Occasional nausea due to change in anxiety/depression medications, Denies any vomiting. Chronic diarrhea, no hematochezia or melenic stools.  No abdominal pain, cramping or early satiety. Denies any urinary issues, dysuria or hematuria.  Craves ice daily.  Denies abnormal weight loss, night sweats, fever. Recently treated for bronchitis and staph infection, resolved at this time.  Denies fatigue, chills, sob, early satiety. Denies any abnormal bleeding or bruising. No recurrent infections or lymphadenopathy. No bone pain. Denies any brittle/dry/or hair loss, nails have always peeled and been soft/weak. No known blood disorders in family. Has had surgery in past w/o issue.      Patient reports started menses at age 10. Menstrual cycle is irregular, typically last 7 days with heavy bleeding and clotting. Patient is scheduled to see OB/GYN this week to discuss treatment options.      up to date on cancer screenings-      Interval History 2/5/26:    Poor appetite due to swollen/  burning tongue.  Chronic diarrhea and occasional nausea due to medications. Denies any abnormal bleeding or bruising. Energy remains about the same. Denies any recent illness, fever, chills or drenching night sweats. Weight is stable. Remains to smoke.     Currently received 4 of 5 Iron Infusions, tolerating well.  Overall patient states no significant changes since our last appointment.     PAST MEDICAL HISTORY:  depression/anxiety, rosacea, allergic rhinitis, obesity     SOCIAL HISTORY:  Regular Diet  Smoker- 1/2 ppd cigarettes and marijuana - +1 year   ETOH- None  (3) pregnancies  she has 2 children  STNA     FAMILY HISTORY:  grandfather and 2 uncles had colon cancer, uncles were in their 50s  aunt had breast cancer, she was in her 40s  No other specific history of bleeding, clotting or malignant disorder in the        PAST SURGICAL HISTORY:    Expolarity surgery, two , tubal ligation, stent kidney stone, colonosopy/EGD, Cyst removal     Objective    BSA: There is no height or weight on file to calculate BSA.  LMP 2024      Physical Exam  Vitals and nursing note reviewed.   Constitutional:       General: She is not in acute distress.     Appearance: Normal appearance.   HENT:      Head: Normocephalic and atraumatic.      Mouth/Throat:      Pharynx: Oropharynx is clear.   Eyes:      General: No scleral icterus.     Extraocular Movements: Extraocular movements intact.      Conjunctiva/sclera: Conjunctivae normal.   Cardiovascular:      Rate and Rhythm: Normal rate and regular rhythm.      Pulses: Normal pulses.      Heart sounds: Normal heart sounds.   Pulmonary:      Effort: Pulmonary effort is normal. No respiratory distress.      Breath sounds: Normal breath sounds.   Abdominal:      General: There is no distension.      Palpations: Abdomen is soft. There is no mass.      Tenderness: There is no abdominal tenderness.   Musculoskeletal:         General: No swelling. Normal range of motion.       Cervical back: Normal range of motion.   Skin:     General: Skin is warm and dry.   Neurological:      General: No focal deficit present.      Mental Status: She is alert and oriented to person, place, and time.      Motor: No weakness.   Psychiatric:         Mood and Affect: Mood normal.         Behavior: Behavior normal.         Thought Content: Thought content normal.         Judgment: Judgment normal.         Performance Status:  Asymptomatic      Assessment/Plan   1. Microcytic anemia  most likely iron deficiency anemia secondary to menstrual blood losses  she follows Maritza Olivarez, OB/gyn  will plan to verify iron deficiency status with ferritin, iron, TIBC levels  check additional anemia labs:  -B12 and folic acid  -LDH, retic and haptoglobin  - ZINA, Rh factor  -TSH     Plan for IV iron replacement if iron deficiency confirmed, she has already GI  complaints and would like to avoid any potential exacerbation with PO iron     Patient was provided educaton on IV iron without premedication, observation for hypersensitivity reaction for 30 minutes post infusion. Side effects of Feraheme were explained, including but are not limited to hypotension, edema, chest pain, hypertension, dizziness, headache, fatigue, pruritus, rash, diarrhea, nausea, constipation, vomiting, abdominal pain, hypersensitivity reaction which can be severe and potentially life-threatening, pain, muscle spasm, shortness of breath, cough, fever, anaphylaxis, angioedema, arrhythmia, cardiac failure, ischemic heart disease, syncope, urticaria-patient agrees to proceed if needed.    Interval 2/5/26:     Currently receiving IV iron and tolerating well.  Overall she feels about the same, no drastic change in her energy. Denies any abnormal bleeding or bruising. She was seen by OB/GYN, and is scheduled to follow-up this week to discuss possibly having an uterine ablation or hysterectomy.   Appetite remains poor due to burning sensation and swelling of  her tongue, possibly due to iron deficiency. However, discussed referral to ENT for evaluation.  Diarrhea for the past several months, possibly due to change in psych meds, however believes it started prior to the change in medications. Chronic GERD symptoms seem to be worsening, discussed a referral to see GI if conditions continue.     Hematological Labs were essentially negative expect for iron deficiency. Iron parameters- Fe 37, Fe sat 8%, Ferritin 18. CBC was stable, Hg 12.3, WBC 9.7, Platelets 432k. No hemolysis was present. Folate, TSH, B12, ZINA, and Rheumatoid factors were normal.      Will plan to continue remaining course of IV iron as scheduled and repeat labs in 6-8 weeks to verify iron replacement.          2. Anxiety/ Depression   Follows psychiatry     3. Diarrhea  Advised follow-up with GI as needed        Plan:  Referral to ENT  Complete ordered course of IV Iron  RTC in 6-8 weeks with labs prior (CBC w/diff, CMP, Ferritin, Iron, B12)        Yoanna Whyte, APRN-CNP

## 2024-02-05 NOTE — PROGRESS NOTES
Referral faxed to Dr Bahena - for swelling tongue  Last Venofer is 2/8  Pt set up for follow up with CNP 3/21 at 930  Instructed to get labs at the hosp a couple days prior  Pt given UA container to bring in ob 2/8- order in system

## 2024-02-05 NOTE — PATIENT INSTRUCTIONS
Referral to ENT- Dr. Bahena in [place]   Complete IV iron infusions  RTC 6-8 weeks post infusions with labs prior   (CBC w/diff, CMP, Ferritin, Iron, B12)

## 2024-02-06 ENCOUNTER — OFFICE VISIT (OUTPATIENT)
Dept: OBSTETRICS AND GYNECOLOGY | Facility: CLINIC | Age: 34
End: 2024-02-06
Payer: COMMERCIAL

## 2024-02-06 ENCOUNTER — APPOINTMENT (OUTPATIENT)
Dept: RADIOLOGY | Facility: HOSPITAL | Age: 34
End: 2024-02-06
Payer: COMMERCIAL

## 2024-02-06 ENCOUNTER — HOSPITAL ENCOUNTER (EMERGENCY)
Facility: HOSPITAL | Age: 34
Discharge: HOME | End: 2024-02-06
Payer: COMMERCIAL

## 2024-02-06 VITALS
SYSTOLIC BLOOD PRESSURE: 122 MMHG | DIASTOLIC BLOOD PRESSURE: 82 MMHG | BODY MASS INDEX: 41.07 KG/M2 | WEIGHT: 240.6 LBS | HEIGHT: 64 IN

## 2024-02-06 VITALS
SYSTOLIC BLOOD PRESSURE: 132 MMHG | TEMPERATURE: 97.7 F | RESPIRATION RATE: 18 BRPM | BODY MASS INDEX: 40.97 KG/M2 | HEIGHT: 64 IN | DIASTOLIC BLOOD PRESSURE: 71 MMHG | OXYGEN SATURATION: 97 % | HEART RATE: 76 BPM | WEIGHT: 240 LBS

## 2024-02-06 DIAGNOSIS — N10 ACUTE PYELONEPHRITIS: Primary | ICD-10-CM

## 2024-02-06 DIAGNOSIS — N92.0 MENORRHAGIA WITH REGULAR CYCLE: Primary | ICD-10-CM

## 2024-02-06 LAB
ALBUMIN SERPL BCP-MCNC: 4.3 G/DL (ref 3.4–5)
ALP SERPL-CCNC: 70 U/L (ref 33–110)
ALT SERPL W P-5'-P-CCNC: 35 U/L (ref 7–45)
ANION GAP SERPL CALC-SCNC: 9 MMOL/L (ref 10–20)
APPEARANCE UR: ABNORMAL
AST SERPL W P-5'-P-CCNC: 17 U/L (ref 9–39)
BACTERIA #/AREA URNS AUTO: ABNORMAL /HPF
BASOPHILS # BLD AUTO: 0.08 X10*3/UL (ref 0–0.1)
BASOPHILS NFR BLD AUTO: 0.5 %
BILIRUB SERPL-MCNC: 0.4 MG/DL (ref 0–1.2)
BILIRUB UR STRIP.AUTO-MCNC: NEGATIVE MG/DL
BUN SERPL-MCNC: 13 MG/DL (ref 6–23)
CALCIUM SERPL-MCNC: 9.5 MG/DL (ref 8.6–10.3)
CHLORIDE SERPL-SCNC: 103 MMOL/L (ref 98–107)
CO2 SERPL-SCNC: 29 MMOL/L (ref 21–32)
COLOR UR: YELLOW
CREAT SERPL-MCNC: 0.86 MG/DL (ref 0.5–1.05)
EGFRCR SERPLBLD CKD-EPI 2021: >90 ML/MIN/1.73M*2
EOSINOPHIL # BLD AUTO: 0.87 X10*3/UL (ref 0–0.7)
EOSINOPHIL NFR BLD AUTO: 5 %
ERYTHROCYTE [DISTWIDTH] IN BLOOD BY AUTOMATED COUNT: 18.9 % (ref 11.5–14.5)
GLUCOSE SERPL-MCNC: 72 MG/DL (ref 74–99)
GLUCOSE UR STRIP.AUTO-MCNC: NEGATIVE MG/DL
HCG UR QL IA.RAPID: NEGATIVE
HCT VFR BLD AUTO: 40.9 % (ref 36–46)
HGB BLD-MCNC: 12.8 G/DL (ref 12–16)
IMM GRANULOCYTES # BLD AUTO: 0.07 X10*3/UL (ref 0–0.7)
IMM GRANULOCYTES NFR BLD AUTO: 0.4 % (ref 0–0.9)
KETONES UR STRIP.AUTO-MCNC: NEGATIVE MG/DL
LEUKOCYTE ESTERASE UR QL STRIP.AUTO: ABNORMAL
LYMPHOCYTES # BLD AUTO: 4.27 X10*3/UL (ref 1.2–4.8)
LYMPHOCYTES NFR BLD AUTO: 24.7 %
MCH RBC QN AUTO: 25.2 PG (ref 26–34)
MCHC RBC AUTO-ENTMCNC: 31.3 G/DL (ref 32–36)
MCV RBC AUTO: 81 FL (ref 80–100)
MONOCYTES # BLD AUTO: 0.92 X10*3/UL (ref 0.1–1)
MONOCYTES NFR BLD AUTO: 5.3 %
MUCOUS THREADS #/AREA URNS AUTO: ABNORMAL /LPF
NEUTROPHILS # BLD AUTO: 11.1 X10*3/UL (ref 1.2–7.7)
NEUTROPHILS NFR BLD AUTO: 64.1 %
NITRITE UR QL STRIP.AUTO: NEGATIVE
NRBC BLD-RTO: 0 /100 WBCS (ref 0–0)
PH UR STRIP.AUTO: 7 [PH]
PLATELET # BLD AUTO: 336 X10*3/UL (ref 150–450)
POTASSIUM SERPL-SCNC: 3.8 MMOL/L (ref 3.5–5.3)
PROT SERPL-MCNC: 6.9 G/DL (ref 6.4–8.2)
PROT UR STRIP.AUTO-MCNC: ABNORMAL MG/DL
RBC # BLD AUTO: 5.08 X10*6/UL (ref 4–5.2)
RBC # UR STRIP.AUTO: ABNORMAL /UL
RBC #/AREA URNS AUTO: >20 /HPF
SODIUM SERPL-SCNC: 137 MMOL/L (ref 136–145)
SP GR UR STRIP.AUTO: 1.02
SQUAMOUS #/AREA URNS AUTO: ABNORMAL /HPF
UROBILINOGEN UR STRIP.AUTO-MCNC: 2 MG/DL
WBC # BLD AUTO: 17.3 X10*3/UL (ref 4.4–11.3)
WBC #/AREA URNS AUTO: >50 /HPF
WBC CLUMPS #/AREA URNS AUTO: ABNORMAL /HPF

## 2024-02-06 PROCEDURE — 36415 COLL VENOUS BLD VENIPUNCTURE: CPT | Performed by: NURSE PRACTITIONER

## 2024-02-06 PROCEDURE — 96361 HYDRATE IV INFUSION ADD-ON: CPT

## 2024-02-06 PROCEDURE — 96375 TX/PRO/DX INJ NEW DRUG ADDON: CPT

## 2024-02-06 PROCEDURE — 74176 CT ABD & PELVIS W/O CONTRAST: CPT

## 2024-02-06 PROCEDURE — 81025 URINE PREGNANCY TEST: CPT | Performed by: NURSE PRACTITIONER

## 2024-02-06 PROCEDURE — 4004F PT TOBACCO SCREEN RCVD TLK: CPT | Performed by: OBSTETRICS & GYNECOLOGY

## 2024-02-06 PROCEDURE — 3074F SYST BP LT 130 MM HG: CPT | Performed by: OBSTETRICS & GYNECOLOGY

## 2024-02-06 PROCEDURE — 99284 EMERGENCY DEPT VISIT MOD MDM: CPT | Mod: 25

## 2024-02-06 PROCEDURE — 74176 CT ABD & PELVIS W/O CONTRAST: CPT | Mod: FOREIGN READ | Performed by: RADIOLOGY

## 2024-02-06 PROCEDURE — 3008F BODY MASS INDEX DOCD: CPT | Performed by: OBSTETRICS & GYNECOLOGY

## 2024-02-06 PROCEDURE — 81001 URINALYSIS AUTO W/SCOPE: CPT | Performed by: NURSE PRACTITIONER

## 2024-02-06 PROCEDURE — 3079F DIAST BP 80-89 MM HG: CPT | Performed by: OBSTETRICS & GYNECOLOGY

## 2024-02-06 PROCEDURE — 96365 THER/PROPH/DIAG IV INF INIT: CPT

## 2024-02-06 PROCEDURE — 2500000004 HC RX 250 GENERAL PHARMACY W/ HCPCS (ALT 636 FOR OP/ED): Performed by: NURSE PRACTITIONER

## 2024-02-06 PROCEDURE — 85025 COMPLETE CBC W/AUTO DIFF WBC: CPT | Performed by: NURSE PRACTITIONER

## 2024-02-06 PROCEDURE — 99213 OFFICE O/P EST LOW 20 MIN: CPT | Performed by: OBSTETRICS & GYNECOLOGY

## 2024-02-06 PROCEDURE — 87086 URINE CULTURE/COLONY COUNT: CPT | Mod: SAMLAB | Performed by: NURSE PRACTITIONER

## 2024-02-06 PROCEDURE — 84075 ASSAY ALKALINE PHOSPHATASE: CPT | Performed by: NURSE PRACTITIONER

## 2024-02-06 RX ORDER — KETOROLAC TROMETHAMINE 30 MG/ML
15 INJECTION, SOLUTION INTRAMUSCULAR; INTRAVENOUS ONCE
Status: COMPLETED | OUTPATIENT
Start: 2024-02-06 | End: 2024-02-06

## 2024-02-06 RX ORDER — PHENAZOPYRIDINE HYDROCHLORIDE 200 MG/1
200 TABLET, FILM COATED ORAL ONCE
Status: COMPLETED | OUTPATIENT
Start: 2024-02-06 | End: 2024-02-06

## 2024-02-06 RX ORDER — DROSPIRENONE AND ETHINYL ESTRADIOL 0.03MG-3MG
1 KIT ORAL DAILY
Qty: 28 TABLET | Refills: 3 | Status: SHIPPED | OUTPATIENT
Start: 2024-02-06 | End: 2024-03-14 | Stop reason: HOSPADM

## 2024-02-06 RX ORDER — CEPHALEXIN 500 MG/1
500 CAPSULE ORAL 4 TIMES DAILY
Qty: 40 CAPSULE | Refills: 0 | Status: SHIPPED | OUTPATIENT
Start: 2024-02-06 | End: 2024-02-23 | Stop reason: ALTCHOICE

## 2024-02-06 RX ORDER — CEFTRIAXONE 1 G/50ML
1 INJECTION, SOLUTION INTRAVENOUS ONCE
Status: COMPLETED | OUTPATIENT
Start: 2024-02-06 | End: 2024-02-06

## 2024-02-06 RX ORDER — ONDANSETRON HYDROCHLORIDE 2 MG/ML
4 INJECTION, SOLUTION INTRAVENOUS ONCE
Status: COMPLETED | OUTPATIENT
Start: 2024-02-06 | End: 2024-02-06

## 2024-02-06 RX ADMIN — KETOROLAC TROMETHAMINE 15 MG: 30 INJECTION, SOLUTION INTRAMUSCULAR at 18:28

## 2024-02-06 RX ADMIN — ONDANSETRON 4 MG: 2 INJECTION INTRAMUSCULAR; INTRAVENOUS at 18:28

## 2024-02-06 RX ADMIN — CEFTRIAXONE SODIUM 1 G: 1 INJECTION, SOLUTION INTRAVENOUS at 19:28

## 2024-02-06 RX ADMIN — SODIUM CHLORIDE 1000 ML: 9 INJECTION, SOLUTION INTRAVENOUS at 18:28

## 2024-02-06 RX ADMIN — PHENAZOPYRIDINE 200 MG: 200 TABLET ORAL at 19:28

## 2024-02-06 ASSESSMENT — PAIN DESCRIPTION - PAIN TYPE: TYPE: ACUTE PAIN

## 2024-02-06 ASSESSMENT — COLUMBIA-SUICIDE SEVERITY RATING SCALE - C-SSRS
1. IN THE PAST MONTH, HAVE YOU WISHED YOU WERE DEAD OR WISHED YOU COULD GO TO SLEEP AND NOT WAKE UP?: NO
2. HAVE YOU ACTUALLY HAD ANY THOUGHTS OF KILLING YOURSELF?: NO
6. HAVE YOU EVER DONE ANYTHING, STARTED TO DO ANYTHING, OR PREPARED TO DO ANYTHING TO END YOUR LIFE?: NO

## 2024-02-06 ASSESSMENT — PAIN SCALES - GENERAL
PAINLEVEL_OUTOF10: 5 - MODERATE PAIN
PAINLEVEL_OUTOF10: 1
PAINLEVEL_OUTOF10: 6

## 2024-02-06 ASSESSMENT — PAIN DESCRIPTION - DESCRIPTORS: DESCRIPTORS: SHARP

## 2024-02-06 ASSESSMENT — PAIN DESCRIPTION - LOCATION: LOCATION: BACK

## 2024-02-06 ASSESSMENT — PAIN - FUNCTIONAL ASSESSMENT: PAIN_FUNCTIONAL_ASSESSMENT: 0-10

## 2024-02-06 NOTE — PROGRESS NOTES
Radha Corley is a 33 y.o. year old female patient.  PCP = Kerrie Fall MD    Chief Complaint   Patient presents with    Menstrual Problem     Patient would like to discuss having an endometrial ablation due to her heavy periods. Patient is currently receiving iron infusions due to anemia. LMP: 24.       HPI   Presents stating that her menstrual flows are heavy with clotting for at least the last year.  She had previous tubal sterilization.  Patient has been receiving iron infusions due to anemia.  Patient is interested in endometrial ablation.    OB History          3    Para   2    Term   2       0    AB   1    Living   2         SAB   1    IAB   0    Ectopic   0    Multiple   0    Live Births   2                 Past Medical History:   Diagnosis Date    Calculus of kidney 2021    Kidney stone on left side    Encounter for  delivery without indication     Delivery of pregnancy by  section    Encounter for gynecological examination (general) (routine) without abnormal findings     Pap test, as part of routine gynecological examination    Other conditions influencing health status     Menstruation    Pain in unspecified hip 2021    Hip pain, acute    Personal history of other complications of pregnancy, childbirth and the puerperium     History of miscarriage    Personal history of other diseases of urinary system 12/10/2020    History of hydronephrosis    Personal history of other specified conditions 12/10/2020    History of flank pain    Personal history of other specified conditions 2021    History of fatigue       Past Surgical History:   Procedure Laterality Date     SECTION, LOW TRANSVERSE      OTHER SURGICAL HISTORY  2021    Cyst excision    TUBAL LIGATION Bilateral        Review of Systems:   Constitutional: No fever or chills  Respiratory: No shortness of breath, or cough  Cardiovascular: No chest pain or  syncope  Breasts: No breast pain, no masses, no nipple discharge  Gastrointestinal: No nausea, vomiting, or diarrhea, no abdominal pain  Genitourinary: No dysuria or frequency  Gynecology: Negative except as noted in history of present illness  All other: All other systems reviewed and negative for complaint    Medication Documentation Review Audit       Reviewed by Andrea Butler MD (Physician) on 24 at 1134      Medication Order Taking? Sig Documenting Provider Last Dose Status   Patient not taking:  Discontinued 24 1426   cholecalciferol (Vitamin D-3) 5,000 Units tablet [id number] No Take 1 tablet (5,000 Units) by mouth once daily. Moody Love MD Taking Active   Patient not taking:  Discontinued 24 1426   hydrOXYzine pamoate (Vistaril) 25 mg capsule [id number] No Take 1 capsule (25 mg) by mouth 3 times a day as needed for anxiety. Moody Love MD Taking Active   iron sucrose (Venofer) 200 mg in sodium chloride 0.9% 100 mL IV [id number]   Yoanna Whyte, APRN-CNP   24 1404   metoprolol succinate XL (Toprol-XL) 50 mg 24 hr tablet [id number] No Take 1 tablet (50 mg) by mouth once daily. Do not crush or chew. Kerrie Fall MD Taking Active   PARoxetine (Paxil) 20 mg tablet [id number] No Take 1 tablet (20 mg) by mouth once daily in the morning. Moody Love MD Taking Active   pyridoxine (Vitamin B-6) 100 mg tablet [id number] No Take 1 tablet (100 mg) by mouth once daily. Moody Love MD Taking Active   QUEtiapine (SEROquel) 400 mg tablet [id number] No Take 0.5 tablets (200 mg) by mouth once daily at bedtime. Moody Love MD Taking Active   spironolactone (Aldactone) 25 mg tablet [id number] No take 1 tablet by mouth once daily Kerrie Fall MD Taking Active   Vyvanse 20 mg capsule [id number] No Take 1 capsule (20 mg) by mouth once daily in the morning. Take before meals. Moody Love MD Taking Active                     /82   Ht  "1.626 m (5' 4\")   Wt 109 kg (240 lb 9.6 oz)   LMP 01/16/2024   BMI 41.30 kg/m²     PHYSICAL EXAMINATION:  General: No acute distress  Eye: Intraocular movements are intact  HEENT: Normocephalic  Respiratory: Respirations are nonlabored  Gastrointestinal: Nondistended   Musculoskeletal: Normal range of motion  Neurologic: Alert and oriented x3  Psychiatric: Cooperative, appropriate mood and affect.      No orders of the defined types were placed in this encounter.       Problem List Items Addressed This Visit    None  Visit Diagnoses       Menorrhagia with regular cycle    -  Primary    Relevant Medications    drospirenone-ethinyl estradioL (Purvi, Ocella) 3-0.03 mg tablet             Provider Impression:  1.  Menorrhagia  Patient was informed of the options of hormonal contraceptive such as birth control pills, vaginal ring, IUD or endometrial ablation.  Patient states that she had an IUD in the past and had issues with the IUD being embedded into the uterine cavity.  Patient informed that approximately 50 to 75% of individuals will have either lighter flow or no flow following that the endometrial ablation.  The other 25% of individuals after the ablation can still have heavy menstrual flows.  Patient wishes to proceed with endometrial ablation.  Patient informed of the need to start birth control pills in order to help thin out the lining of the uterus as well as performance of an endometrial biopsy.  Patient to return in 3 weeks for annual exam and then 1 week later for endometrial biopsy.    "

## 2024-02-06 NOTE — ED PROVIDER NOTES
Emergency Department Encounter  [facility] EMERGENCY MEDICINE    Patient: Radha Corley  MRN: [id number]  : 1990  Date of Evaluation: 2024  ED Provider: BHARATHI Navarro      Chief Complaint       Chief Complaint   Patient presents with    Flank Pain     Pt stated she is having bilateral kidney pain.      [place]    (Location/Symptom, Timing/Onset, Context/Setting, Quality, Duration, Modifying Factors, Severity) Note limiting factors.   Limitations to History: None  Historian: Self  Records reviewed: EMR inpatient and outpatient notes, Care Everywhere      Radha Corley is a 33 y.o. female who presents to the emergency department complaining of suprapubic pressure, urethral pressure, flank pain bilaterally, worse on the right ongoing for the last week, intermittent, but getting worse over the last 3 days.  Has a history of kidney stones.  Denies any hematuria, fever, chills, does endorse some nausea, pain is similar to kidney stones in the past.  States that she has passed them on her own but has also needed a stent in the past.  Denies any rashes, lesions, chest pain, shortness of breath, vomiting, diarrhea, lightheadedness, dizziness, syncope.    ROS:     Review of Systems  14 systems reviewed and otherwise acutely negative except as in the [place].          Past History     Past Medical History:   Diagnosis Date    Calculus of kidney 2021    Kidney stone on left side    Encounter for  delivery without indication     Delivery of pregnancy by  section    Encounter for gynecological examination (general) (routine) without abnormal findings     Pap test, as part of routine gynecological examination    Other conditions influencing health status     Menstruation    Pain in unspecified hip 2021    Hip pain, acute    Personal history of other complications of pregnancy, childbirth and the puerperium     History of miscarriage    Personal history of  other diseases of urinary system 12/10/2020    History of hydronephrosis    Personal history of other specified conditions 12/10/2020    History of flank pain    Personal history of other specified conditions 2021    History of fatigue     Past Surgical History:   Procedure Laterality Date     SECTION, LOW TRANSVERSE      OTHER SURGICAL HISTORY  2021    Cyst excision    TUBAL LIGATION Bilateral 2014     Social History     Socioeconomic History    Marital status: Single     Spouse name: None    Number of children: None    Years of education: None    Highest education level: None   Occupational History    None   Tobacco Use    Smoking status: Some Days     Types: Cigarettes    Smokeless tobacco: Never   Vaping Use    Vaping Use: Never used   Substance and Sexual Activity    Alcohol use: Not Currently    Drug use: Never    Sexual activity: None   Other Topics Concern    None   Social History Narrative    None     Social Determinants of Health     Financial Resource Strain: Not on file   Food Insecurity: Not on file   Transportation Needs: Not on file   Physical Activity: Not on file   Stress: Not on file   Social Connections: Not on file   Intimate Partner Violence: Not on file   Housing Stability: Not on file       Medications/Allergies     Previous Medications    CHOLECALCIFEROL (VITAMIN D-3) 5,000 UNITS TABLET    Take 1 tablet (5,000 Units) by mouth once daily.    DROSPIRENONE-ETHINYL ESTRADIOL (RUFINO, OCELLA) 3-0.03 MG TABLET    Take 1 tablet by mouth once daily.    HYDROXYZINE PAMOATE (VISTARIL) 25 MG CAPSULE    Take 1 capsule (25 mg) by mouth 3 times a day as needed for anxiety.    METOPROLOL SUCCINATE XL (TOPROL-XL) 50 MG 24 HR TABLET    Take 1 tablet (50 mg) by mouth once daily. Do not crush or chew.    PAROXETINE (PAXIL) 20 MG TABLET    Take 1 tablet (20 mg) by mouth once daily in the morning.    PYRIDOXINE (VITAMIN B-6) 100 MG TABLET    Take 1 tablet (100 mg) by mouth once daily.     QUETIAPINE (SEROQUEL) 400 MG TABLET    Take 0.5 tablets (200 mg) by mouth once daily at bedtime.    SPIRONOLACTONE (ALDACTONE) 25 MG TABLET    take 1 tablet by mouth once daily    VYVANSE 20 MG CAPSULE    Take 1 capsule (20 mg) by mouth once daily in the morning. Take before meals.     Allergies   Allergen Reactions    Clavulanic Acid Unknown    Amoxicillin-Pot Clavulanate Rash        Physical Exam       ED Triage Vitals [02/06/24 1751]   Temperature Heart Rate Respirations BP   36.5 °C (97.7 °F) 82 17 131/82      Pulse Ox Temp src Heart Rate Source Patient Position   96 % -- Monitor Sitting      BP Location FiO2 (%)     Left arm --         Physical Exam    GENERAL:  The patient appears nourished and normally developed. Vital signs as documented.     HEENT:  Head normocephalic, atraumatic, EOMs intact, PERRLA, Mucous membranes moist. Nares patent without copious rhinorrhea.  No lymphadenopathy.    PULMONARY:  Lungs are clear to auscultation, without any respiratory distress. Able to speak full sentences, no accessory muscle use    CARDIAC:   Normal rate. No murmurs, rubs or gallops    ABDOMEN:  Soft, non distended, non tender, BS positive x 4 quadrants, No rebound or guarding, no peritoneal signs, mild right-sided CVA tenderness, no masses or organomegaly    MUSCULOSKELETAL:   Able to ambulate, Non edematous, with no obvious deformities. Pulses intact distal    SKIN:   Good color, with no significant rashes.  No pallor.    NEURO:  No obvious neurological deficits, normal sensation and strength bilaterally.  Able to follow commands, NIH 0, CN 2-12 intact.    Diagnostics   Labs:  Labs Reviewed   CBC WITH AUTO DIFFERENTIAL - Abnormal       Result Value    WBC 17.3 (*)     nRBC 0.0      RBC 5.08      Hemoglobin 12.8      Hematocrit 40.9      MCV 81      MCH 25.2 (*)     MCHC 31.3 (*)     RDW 18.9 (*)     Platelets 336      Neutrophils % 64.1      Immature Granulocytes %, Automated 0.4      Lymphocytes % 24.7       Monocytes % 5.3      Eosinophils % 5.0      Basophils % 0.5      Neutrophils Absolute 11.10 (*)     Immature Granulocytes Absolute, Automated 0.07      Lymphocytes Absolute 4.27      Monocytes Absolute 0.92      Eosinophils Absolute 0.87 (*)     Basophils Absolute 0.08     COMPREHENSIVE METABOLIC PANEL - Abnormal    Glucose 72 (*)     Sodium 137      Potassium 3.8      Chloride 103      Bicarbonate 29      Anion Gap 9 (*)     Urea Nitrogen 13      Creatinine 0.86      eGFR >90      Calcium 9.5      Albumin 4.3      Alkaline Phosphatase 70      Total Protein 6.9      AST 17      Bilirubin, Total 0.4      ALT 35     URINALYSIS WITH REFLEX CULTURE AND MICROSCOPIC - Abnormal    Color, Urine Yellow      Appearance, Urine Hazy (*)     Specific Gravity, Urine 1.016      pH, Urine 7.0      Protein, Urine 30 (1+) (*)     Glucose, Urine NEGATIVE      Blood, Urine SMALL (1+) (*)     Ketones, Urine NEGATIVE      Bilirubin, Urine NEGATIVE      Urobilinogen, Urine 2.0 (*)     Nitrite, Urine NEGATIVE      Leukocyte Esterase, Urine LARGE (3+) (*)    MICROSCOPIC ONLY, URINE - Abnormal    WBC, Urine >50 (*)     WBC Clumps, Urine MANY      RBC, Urine >20 (*)     Squamous Epithelial Cells, Urine 1-9 (SPARSE)      Bacteria, Urine 1+ (*)     Mucus, Urine 1+     HCG, URINE, QUALITATIVE - Normal    HCG, Urine NEGATIVE     URINE CULTURE   URINALYSIS WITH REFLEX CULTURE AND MICROSCOPIC    Narrative:     The following orders were created for panel order Urinalysis with Reflex Culture and Microscopic.  Procedure                               Abnormality         Status                     ---------                               -----------         ------                     Urinalysis with Reflex C...[[id number]]  Abnormal            Final result               Extra Urine Gray Tube[[id number]]                            In process                   Please view results for these tests on the individual orders.   EXTRA URINE GRAY TUBE  "    Radiographs:  CT abdomen pelvis wo IV contrast   Final Result   Small nonobstructing calculi remain in the central kidneys bilaterally   but there is no evidence of hydronephrosis and both ureters are   normal.  The CT abdomen and pelvis otherwise is unremarkable..   Signed by Giorgi Centeno MD                Assessment   In brief, Radha Corley is a 33 y.o. female who presented to the emergency department for flank pain, urinary symptoms, nausea    Plan   IV, lab work, analgesics, fluids, imaging    Differentials   Hydronephrosis  Pyelonephritis  Renal colic  Complicated stone    ED Course     Diagnoses as of 02/06/24 1954   Acute pyelonephritis       Visit Vitals  /82 (BP Location: Left arm, Patient Position: Sitting)   Pulse 82   Temp 36.5 °C (97.7 °F)   Resp 17   Ht 1.626 m (5' 4\")   Wt 109 kg (240 lb)   LMP 01/16/2024   SpO2 96%   BMI 41.20 kg/m²   OB Status Having periods   Smoking Status Some Days   BSA 2.22 m²       Medications   cefTRIAXone (Rocephin) IVPB 1 g (1 g intravenous New Bag 2/6/24 1928)   ondansetron (Zofran) injection 4 mg (4 mg intravenous Given 2/6/24 1828)   ketorolac (Toradol) injection 15 mg (15 mg intravenous Given 2/6/24 1828)   sodium chloride 0.9 % bolus 1,000 mL (1,000 mL intravenous New Bag 2/6/24 1828)   phenazopyridine (Pyridium) tablet 200 mg (200 mg oral Given 2/6/24 1928)       Plan of care discussed, patient is stable appearing, given Zofran, Toradol, fluids.  Endorsing persistent urethral discomfort, given Pyridium which patient states has helped her in the past.  States the pain to her back has subsided after Toradol administration.  Lab work shows a leukocytosis with a white count of 17,000, urinalysis consistent with infection, with patient's leukocytosis, flank pain with no stones in the ureters and no hydronephrosis on imaging, patient be treated for acute pyelonephritis.  Given a dose of Rocephin in the emergency department and patient tolerated this " well, will be discharged with a prescription for Keflex, discharged home in stable condition, educated on any worsening signs and symptoms to return to the emergency department, patient is agreeable to above plan and is stable for discharge      Final Impression      1. Acute pyelonephritis          DISPOSITION  Disposition: Discharge to home  Patient condition is stable    Comment: Please note this report has been produced using speech recognition software and may contain errors related to that system including errors in grammar, punctuation, and spelling, as well as words and phrases that may be inappropriate.  If there are any questions or concerns please feel free to contact the dictating provider for clarification.    SANTOS Navarro-BHARATHI Guevara  02/06/24 1955

## 2024-02-07 LAB — HOLD SPECIMEN: NORMAL

## 2024-02-08 ENCOUNTER — INFUSION (OUTPATIENT)
Dept: HEMATOLOGY/ONCOLOGY | Facility: CLINIC | Age: 34
End: 2024-02-08
Payer: COMMERCIAL

## 2024-02-08 VITALS
BODY MASS INDEX: 42.04 KG/M2 | HEART RATE: 73 BPM | DIASTOLIC BLOOD PRESSURE: 66 MMHG | TEMPERATURE: 96.4 F | OXYGEN SATURATION: 97 % | RESPIRATION RATE: 14 BRPM | WEIGHT: 244.93 LBS | SYSTOLIC BLOOD PRESSURE: 99 MMHG

## 2024-02-08 DIAGNOSIS — D50.9 IRON DEFICIENCY ANEMIA, UNSPECIFIED IRON DEFICIENCY ANEMIA TYPE: ICD-10-CM

## 2024-02-08 PROCEDURE — 2500000004 HC RX 250 GENERAL PHARMACY W/ HCPCS (ALT 636 FOR OP/ED)

## 2024-02-08 PROCEDURE — 96365 THER/PROPH/DIAG IV INF INIT: CPT

## 2024-02-08 RX ORDER — ALBUTEROL SULFATE 0.83 MG/ML
3 SOLUTION RESPIRATORY (INHALATION) AS NEEDED
OUTPATIENT
Start: 2024-02-09

## 2024-02-08 RX ORDER — EPINEPHRINE 0.3 MG/.3ML
0.3 INJECTION SUBCUTANEOUS EVERY 5 MIN PRN
OUTPATIENT
Start: 2024-02-09

## 2024-02-08 RX ORDER — HEPARIN 100 UNIT/ML
500 SYRINGE INTRAVENOUS AS NEEDED
OUTPATIENT
Start: 2024-02-08

## 2024-02-08 RX ORDER — FAMOTIDINE 10 MG/ML
20 INJECTION INTRAVENOUS ONCE AS NEEDED
OUTPATIENT
Start: 2024-02-09

## 2024-02-08 RX ORDER — DIPHENHYDRAMINE HYDROCHLORIDE 50 MG/ML
50 INJECTION INTRAMUSCULAR; INTRAVENOUS AS NEEDED
OUTPATIENT
Start: 2024-02-09

## 2024-02-08 RX ORDER — HEPARIN SODIUM,PORCINE/PF 10 UNIT/ML
50 SYRINGE (ML) INTRAVENOUS AS NEEDED
OUTPATIENT
Start: 2024-02-08

## 2024-02-08 RX ADMIN — IRON SUCROSE 200 MG: 20 INJECTION, SOLUTION INTRAVENOUS at 09:07

## 2024-02-08 ASSESSMENT — PAIN SCALES - GENERAL: PAINLEVEL: 4

## 2024-02-09 LAB — BACTERIA UR CULT: ABNORMAL

## 2024-02-10 ENCOUNTER — HOSPITAL ENCOUNTER (EMERGENCY)
Facility: HOSPITAL | Age: 34
Discharge: HOME | End: 2024-02-10
Attending: EMERGENCY MEDICINE
Payer: COMMERCIAL

## 2024-02-10 ENCOUNTER — APPOINTMENT (OUTPATIENT)
Dept: RADIOLOGY | Facility: HOSPITAL | Age: 34
End: 2024-02-10
Payer: COMMERCIAL

## 2024-02-10 VITALS
HEIGHT: 64 IN | TEMPERATURE: 96.8 F | BODY MASS INDEX: 40.97 KG/M2 | RESPIRATION RATE: 18 BRPM | HEART RATE: 81 BPM | OXYGEN SATURATION: 98 % | WEIGHT: 240 LBS | SYSTOLIC BLOOD PRESSURE: 128 MMHG | DIASTOLIC BLOOD PRESSURE: 75 MMHG

## 2024-02-10 DIAGNOSIS — N39.0 URINARY TRACT INFECTION WITHOUT HEMATURIA, SITE UNSPECIFIED: Primary | ICD-10-CM

## 2024-02-10 LAB
ALBUMIN SERPL BCP-MCNC: 4 G/DL (ref 3.4–5)
ALP SERPL-CCNC: 58 U/L (ref 33–110)
ALT SERPL W P-5'-P-CCNC: 27 U/L (ref 7–45)
ANION GAP SERPL CALC-SCNC: 9 MMOL/L (ref 10–20)
AST SERPL W P-5'-P-CCNC: 16 U/L (ref 9–39)
BASOPHILS # BLD AUTO: 0.06 X10*3/UL (ref 0–0.1)
BASOPHILS NFR BLD AUTO: 0.6 %
BILIRUB SERPL-MCNC: 0.5 MG/DL (ref 0–1.2)
BUN SERPL-MCNC: 11 MG/DL (ref 6–23)
CALCIUM SERPL-MCNC: 9.2 MG/DL (ref 8.6–10.3)
CHLORIDE SERPL-SCNC: 106 MMOL/L (ref 98–107)
CO2 SERPL-SCNC: 26 MMOL/L (ref 21–32)
CREAT SERPL-MCNC: 0.73 MG/DL (ref 0.5–1.05)
EGFRCR SERPLBLD CKD-EPI 2021: >90 ML/MIN/1.73M*2
EOSINOPHIL # BLD AUTO: 0.63 X10*3/UL (ref 0–0.7)
EOSINOPHIL NFR BLD AUTO: 5.8 %
ERYTHROCYTE [DISTWIDTH] IN BLOOD BY AUTOMATED COUNT: 19.2 % (ref 11.5–14.5)
GLUCOSE SERPL-MCNC: 84 MG/DL (ref 74–99)
HCT VFR BLD AUTO: 39.9 % (ref 36–46)
HGB BLD-MCNC: 12.8 G/DL (ref 12–16)
IMM GRANULOCYTES # BLD AUTO: 0.05 X10*3/UL (ref 0–0.7)
IMM GRANULOCYTES NFR BLD AUTO: 0.5 % (ref 0–0.9)
LYMPHOCYTES # BLD AUTO: 2.46 X10*3/UL (ref 1.2–4.8)
LYMPHOCYTES NFR BLD AUTO: 22.6 %
MCH RBC QN AUTO: 25.9 PG (ref 26–34)
MCHC RBC AUTO-ENTMCNC: 32.1 G/DL (ref 32–36)
MCV RBC AUTO: 81 FL (ref 80–100)
MONOCYTES # BLD AUTO: 0.48 X10*3/UL (ref 0.1–1)
MONOCYTES NFR BLD AUTO: 4.4 %
NEUTROPHILS # BLD AUTO: 7.21 X10*3/UL (ref 1.2–7.7)
NEUTROPHILS NFR BLD AUTO: 66.1 %
NRBC BLD-RTO: 0 /100 WBCS (ref 0–0)
PLATELET # BLD AUTO: 277 X10*3/UL (ref 150–450)
POTASSIUM SERPL-SCNC: 4.2 MMOL/L (ref 3.5–5.3)
PROT SERPL-MCNC: 6.6 G/DL (ref 6.4–8.2)
RBC # BLD AUTO: 4.95 X10*6/UL (ref 4–5.2)
SODIUM SERPL-SCNC: 137 MMOL/L (ref 136–145)
WBC # BLD AUTO: 10.9 X10*3/UL (ref 4.4–11.3)

## 2024-02-10 PROCEDURE — 36415 COLL VENOUS BLD VENIPUNCTURE: CPT | Performed by: EMERGENCY MEDICINE

## 2024-02-10 PROCEDURE — 96361 HYDRATE IV INFUSION ADD-ON: CPT

## 2024-02-10 PROCEDURE — 2500000002 HC RX 250 W HCPCS SELF ADMINISTERED DRUGS (ALT 637 FOR MEDICARE OP, ALT 636 FOR OP/ED): Performed by: EMERGENCY MEDICINE

## 2024-02-10 PROCEDURE — 74177 CT ABD & PELVIS W/CONTRAST: CPT | Performed by: RADIOLOGY

## 2024-02-10 PROCEDURE — 96375 TX/PRO/DX INJ NEW DRUG ADDON: CPT

## 2024-02-10 PROCEDURE — 96374 THER/PROPH/DIAG INJ IV PUSH: CPT | Mod: 59

## 2024-02-10 PROCEDURE — 2550000001 HC RX 255 CONTRASTS: Performed by: EMERGENCY MEDICINE

## 2024-02-10 PROCEDURE — 2500000004 HC RX 250 GENERAL PHARMACY W/ HCPCS (ALT 636 FOR OP/ED): Performed by: EMERGENCY MEDICINE

## 2024-02-10 PROCEDURE — 85025 COMPLETE CBC W/AUTO DIFF WBC: CPT | Performed by: EMERGENCY MEDICINE

## 2024-02-10 PROCEDURE — 74177 CT ABD & PELVIS W/CONTRAST: CPT

## 2024-02-10 PROCEDURE — 84075 ASSAY ALKALINE PHOSPHATASE: CPT | Performed by: EMERGENCY MEDICINE

## 2024-02-10 PROCEDURE — 99284 EMERGENCY DEPT VISIT MOD MDM: CPT | Mod: 25 | Performed by: EMERGENCY MEDICINE

## 2024-02-10 RX ORDER — CIPROFLOXACIN 500 MG/1
500 TABLET ORAL 2 TIMES DAILY
Qty: 14 TABLET | Refills: 0 | Status: SHIPPED | OUTPATIENT
Start: 2024-02-10 | End: 2024-02-23 | Stop reason: ALTCHOICE

## 2024-02-10 RX ORDER — ONDANSETRON HYDROCHLORIDE 2 MG/ML
4 INJECTION, SOLUTION INTRAVENOUS ONCE
Status: COMPLETED | OUTPATIENT
Start: 2024-02-10 | End: 2024-02-10

## 2024-02-10 RX ORDER — PHENAZOPYRIDINE HYDROCHLORIDE 200 MG/1
200 TABLET, FILM COATED ORAL 3 TIMES DAILY
Qty: 6 TABLET | Refills: 0 | Status: SHIPPED | OUTPATIENT
Start: 2024-02-10 | End: 2024-02-12

## 2024-02-10 RX ORDER — PHENAZOPYRIDINE HYDROCHLORIDE 200 MG/1
200 TABLET, FILM COATED ORAL ONCE
Status: COMPLETED | OUTPATIENT
Start: 2024-02-10 | End: 2024-02-10

## 2024-02-10 RX ORDER — ONDANSETRON 4 MG/1
4 TABLET, ORALLY DISINTEGRATING ORAL EVERY 8 HOURS PRN
Qty: 15 TABLET | Refills: 0 | Status: SHIPPED | OUTPATIENT
Start: 2024-02-10 | End: 2024-05-21 | Stop reason: WASHOUT

## 2024-02-10 RX ORDER — MORPHINE SULFATE 4 MG/ML
4 INJECTION, SOLUTION INTRAMUSCULAR; INTRAVENOUS ONCE
Status: COMPLETED | OUTPATIENT
Start: 2024-02-10 | End: 2024-02-10

## 2024-02-10 RX ORDER — KETOROLAC TROMETHAMINE 10 MG/1
10 TABLET, FILM COATED ORAL EVERY 6 HOURS PRN
Qty: 20 TABLET | Refills: 0 | Status: SHIPPED | OUTPATIENT
Start: 2024-02-10 | End: 2024-02-15

## 2024-02-10 RX ADMIN — PHENAZOPYRIDINE 200 MG: 200 TABLET ORAL at 14:45

## 2024-02-10 RX ADMIN — IOHEXOL 70 ML: 350 INJECTION, SOLUTION INTRAVENOUS at 13:56

## 2024-02-10 RX ADMIN — ONDANSETRON 4 MG: 2 INJECTION INTRAMUSCULAR; INTRAVENOUS at 13:53

## 2024-02-10 RX ADMIN — SODIUM CHLORIDE 1000 ML: 9 INJECTION, SOLUTION INTRAVENOUS at 13:52

## 2024-02-10 RX ADMIN — MORPHINE SULFATE 4 MG: 4 INJECTION, SOLUTION INTRAMUSCULAR; INTRAVENOUS at 13:53

## 2024-02-10 ASSESSMENT — PAIN DESCRIPTION - PROGRESSION: CLINICAL_PROGRESSION: GRADUALLY WORSENING

## 2024-02-10 ASSESSMENT — PAIN SCALES - GENERAL
PAINLEVEL_OUTOF10: 6
PAINLEVEL_OUTOF10: 6

## 2024-02-10 ASSESSMENT — PAIN DESCRIPTION - PAIN TYPE: TYPE: ACUTE PAIN

## 2024-02-10 ASSESSMENT — PAIN DESCRIPTION - FREQUENCY: FREQUENCY: CONSTANT/CONTINUOUS

## 2024-02-10 ASSESSMENT — PAIN - FUNCTIONAL ASSESSMENT
PAIN_FUNCTIONAL_ASSESSMENT: 0-10
PAIN_FUNCTIONAL_ASSESSMENT: 0-10

## 2024-02-10 ASSESSMENT — PAIN DESCRIPTION - LOCATION: LOCATION: BACK

## 2024-02-10 ASSESSMENT — PAIN DESCRIPTION - DESCRIPTORS: DESCRIPTORS: ACHING

## 2024-02-10 ASSESSMENT — PAIN DESCRIPTION - ONSET: ONSET: SUDDEN

## 2024-02-10 ASSESSMENT — PAIN DESCRIPTION - ORIENTATION: ORIENTATION: LEFT;RIGHT;LOWER

## 2024-02-10 NOTE — ED PROVIDER NOTES
HPI   Chief Complaint   Patient presents with    Vomiting     Patient to ED reference nausea/vomiting with increased pin that started this morning. She was treated in the ED for a kidney infection and placed on Keflex. She also just finished her second round of iron infusion.       Limitations to History: None    HPI: 33-year-old female presents with back pain, vomiting, urinary frequency.  Diagnosed with UTI 4 days ago.  Been taking Keflex.  Began vomiting today.  Denies any fever, chills, shortness of breath, vaginal bleeding or discharge.    ------------------------------------------------------------------------------------------------------------------------------------------  Physical Exam:    VS: As documented in the triage note and EMR flowsheet from this visit were reviewed.    Appearance: Alert. cooperative,  in no acute distress.   Skin: Intact,  dry skin, no lesions, rash, petechiae or purpura.   Eyes: PERRLA, EOMs intact,  Conjunctiva pink with no redness or exudates.   HENT: Normocephalic, atraumatic. Nares patent. No intraoral lesions.   Neck: Supple, without meningismus. Trachea at midline. No lymphadenopathy.  Pulmonary: Clear bilaterally with good chest wall excursion. No rales, rhonchi or wheezing. No accessory muscle use or stridor.  Cardiac: Regular rate and rhythm, no rubs, murmurs, or gallops.   Abdomen: Abdomen is soft, nontender, and nondistended.  No palpable organomegaly.  No rebound or guarding.  Bilateral CVA tenderness. Nonsurgical abdomen.  Genitourinary: Exam deferred.  Musculoskeletal: Full range of motion.  Pulses full and equal. No cyanosis, clubbing, or edema.  Psychiatric: Appropriate mood and affect.                            No data recorded                   Patient History   Past Medical History:   Diagnosis Date    Calculus of kidney 2021    Kidney stone on left side    Encounter for  delivery without indication     Delivery of pregnancy by  section     Encounter for gynecological examination (general) (routine) without abnormal findings     Pap test, as part of routine gynecological examination    Other conditions influencing health status     Menstruation    Pain in unspecified hip 2021    Hip pain, acute    Personal history of other complications of pregnancy, childbirth and the puerperium     History of miscarriage    Personal history of other diseases of urinary system 12/10/2020    History of hydronephrosis    Personal history of other specified conditions 12/10/2020    History of flank pain    Personal history of other specified conditions 2021    History of fatigue     Past Surgical History:   Procedure Laterality Date     SECTION, LOW TRANSVERSE      OTHER SURGICAL HISTORY  2021    Cyst excision    TUBAL LIGATION Bilateral 2014     Family History   Problem Relation Name Age of Onset    Depression Mother      Hypertension Mother      Hyperlipidemia Mother      Stroke Mother      Heart attack Mother      Anxiety disorder Mother      Fibromyalgia Mother      Parkinsonism Father      Heart disease Father      Coronary artery disease Father       Social History     Tobacco Use    Smoking status: Some Days     Types: Cigarettes    Smokeless tobacco: Never   Vaping Use    Vaping Use: Never used   Substance Use Topics    Alcohol use: Not Currently    Drug use: Never       Physical Exam   ED Triage Vitals [02/10/24 1317]   Temperature Heart Rate Respirations BP   36 °C (96.8 °F) 87 18 108/77      Pulse Ox Temp Source Heart Rate Source Patient Position   96 % Temporal -- Lying      BP Location FiO2 (%)     Left arm --       Physical Exam    ED Course & MDM   Diagnoses as of 02/10/24 1446   Urinary tract infection without hematuria, site unspecified       Medical Decision Making  Labs Reviewed  CBC WITH AUTO DIFFERENTIAL - Abnormal     WBC                           10.9                   nRBC                          0.0                     RBC                           4.95                   Hemoglobin                    12.8                   Hematocrit                    39.9                   MCV                           81                     MCH                           25.9 (*)               MCHC                          32.1                   RDW                           19.2 (*)               Platelets                     277                    Neutrophils %                 66.1                   Immature Granulocytes %, Automated   0.5                    Lymphocytes %                 22.6                   Monocytes %                   4.4                    Eosinophils %                 5.8                    Basophils %                   0.6                    Neutrophils Absolute          7.21                   Immature Granulocytes Absolute, Au*   0.05                   Lymphocytes Absolute          2.46                   Monocytes Absolute            0.48                   Eosinophils Absolute          0.63                   Basophils Absolute            0.06                COMPREHENSIVE METABOLIC PANEL - Abnormal  CT abdomen pelvis w IV contrast   Final Result    No acute findings in the abdomen or pelvis          No acute appendicitis, diverticulitis or other colitis          No bowel obstruction, perforation, abscess or free fluid          One tiny stone in each kidney, both unchanged. No    obstructing/offending stone. No hydroureteronephrosis          No compelling CT evidence for acute pyelonephritis, a clinical    diagnosis no imaging study of any variety can definitively exclude          No acute pancreatitis or any other acute solid organ findings such as    acute splenic or renal infarct          No unexpected adnexal findings for age, only a small degenerating    follicle or cyst in the right adnexa          No acute skeletal findings to account for new or different back pain          MACRO:    None          Signed by: Vladimir  Kieranshan 2/10/2024 2:12 PM    Dictation workstation:   [id number]     Medical Decision Making:    Patient appears well and nontoxic.  Afebrile.  No tachycardia.  White blood cell count improved.  CT with IV contrast performed to rule out pyelonephritis versus renal abscess which was negative.  Patient continues to have urethral pain.  Patient be written for Pyridium, Cipro, ketorolac, Zofran.  Advised on follow-up with primary care.  Stable at time of discharge.    Differential Diagnoses Considered: UTI, pyelonephritis, renal abscess, musculoskeletal pain    Independent Interpretation of Studies:  I independently interpreted: CT of the abdomen pelvis with IV contrast shows no evidence of pyelonephritis.    Escalation of Care:  Appropriate for discharge and follow-up with primary care.    Prescription Drug Consideration: Oral Cipro, Pyridium, ketorolac, Zofran            Procedure  Procedures     Edward Goldberg DO  02/10/24 1448

## 2024-02-23 ENCOUNTER — OFFICE VISIT (OUTPATIENT)
Dept: PRIMARY CARE | Facility: CLINIC | Age: 34
End: 2024-02-23
Payer: COMMERCIAL

## 2024-02-23 VITALS
HEART RATE: 74 BPM | SYSTOLIC BLOOD PRESSURE: 116 MMHG | DIASTOLIC BLOOD PRESSURE: 76 MMHG | BODY MASS INDEX: 39.59 KG/M2 | HEIGHT: 64 IN | WEIGHT: 231.9 LBS

## 2024-02-23 DIAGNOSIS — J45.21 MILD INTERMITTENT ASTHMATIC BRONCHITIS WITH ACUTE EXACERBATION (HHS-HCC): Primary | ICD-10-CM

## 2024-02-23 PROCEDURE — 4004F PT TOBACCO SCREEN RCVD TLK: CPT | Performed by: FAMILY MEDICINE

## 2024-02-23 PROCEDURE — 99213 OFFICE O/P EST LOW 20 MIN: CPT | Performed by: FAMILY MEDICINE

## 2024-02-23 PROCEDURE — 3074F SYST BP LT 130 MM HG: CPT | Performed by: FAMILY MEDICINE

## 2024-02-23 PROCEDURE — 3008F BODY MASS INDEX DOCD: CPT | Performed by: FAMILY MEDICINE

## 2024-02-23 PROCEDURE — 3078F DIAST BP <80 MM HG: CPT | Performed by: FAMILY MEDICINE

## 2024-02-23 RX ORDER — ALBUTEROL SULFATE 90 UG/1
2 AEROSOL, METERED RESPIRATORY (INHALATION) EVERY 4 HOURS PRN
Qty: 8 G | Refills: 5 | Status: SHIPPED | OUTPATIENT
Start: 2024-02-23 | End: 2025-02-22

## 2024-02-23 NOTE — PROGRESS NOTES
Subjective   Patient ID: Radha Corley is a 33 y.o. female who presents for pain in urethra still. Patient still has their URI issues. X2 months.           HPI     Review of Systems    Objective   LMP 01/16/2024     Physical Exam    Assessment/Plan

## 2024-02-23 NOTE — PATIENT INSTRUCTIONS
Avoid the soaps near the urethra, will Rx more albuterol for the cough/wheezing but suspect that it is viral and further antibiotics are not likely to be helpful.  Discussed OTC symptomatic treatment.  Follow up if no improvement or if she worsens.

## 2024-02-23 NOTE — PROGRESS NOTES
Subjective   Radha Corley is a 33 y.o. female who presents for No chief complaint on file..  Here c/o persistent cold symptoms.  She was in the ER recently for persistent symptoms and was told that it was bronchitis and kidney infection.  She was treated with levaquin and prednisone and recently finished those.  She is still having some pain in her urethra that she was told was related to the kidney infection.  She is not having any pain with with urination at this time.  No blood in the urine.  She states that she has been using antibacterial body wash in that area - discussed that may be irritating things.             Objective   Visit Vitals  /76 (BP Location: Left arm, Patient Position: Sitting, BP Cuff Size: Adult)   Pulse 74      Physical Exam  Vitals reviewed.   Constitutional:       General: She is not in acute distress.  Cardiovascular:      Rate and Rhythm: Normal rate and regular rhythm.      Heart sounds: No murmur heard.  Pulmonary:      Effort: Pulmonary effort is normal. No respiratory distress.      Breath sounds: Wheezing present.   Skin:     General: Skin is warm and dry.   Neurological:      General: No focal deficit present.      Mental Status: She is alert. Mental status is at baseline.         Assessment/Plan   Problem List Items Addressed This Visit    None  Visit Diagnoses       Mild intermittent asthmatic bronchitis with acute exacerbation    -  Primary    Relevant Medications    albuterol (Ventolin HFA) 90 mcg/actuation inhaler               Kerrie Fall MD

## 2024-02-28 ENCOUNTER — OFFICE VISIT (OUTPATIENT)
Dept: OBSTETRICS AND GYNECOLOGY | Facility: CLINIC | Age: 34
End: 2024-02-28
Payer: COMMERCIAL

## 2024-02-28 VITALS
SYSTOLIC BLOOD PRESSURE: 118 MMHG | BODY MASS INDEX: 40.26 KG/M2 | DIASTOLIC BLOOD PRESSURE: 68 MMHG | WEIGHT: 235.8 LBS | HEIGHT: 64 IN

## 2024-02-28 DIAGNOSIS — Z12.4 ENCOUNTER FOR SCREENING FOR CERVICAL CANCER: ICD-10-CM

## 2024-02-28 DIAGNOSIS — Z01.419 ENCOUNTER FOR GYNECOLOGICAL EXAMINATION WITHOUT ABNORMAL FINDING: ICD-10-CM

## 2024-02-28 DIAGNOSIS — R10.2 PELVIC PRESSURE IN FEMALE: ICD-10-CM

## 2024-02-28 LAB
POC APPEARANCE, URINE: CLEAR
POC BILIRUBIN, URINE: ABNORMAL
POC BLOOD, URINE: ABNORMAL
POC COLOR, URINE: YELLOW
POC GLUCOSE, URINE: NEGATIVE MG/DL
POC KETONES, URINE: ABNORMAL MG/DL
POC LEUKOCYTES, URINE: ABNORMAL
POC NITRITE,URINE: NEGATIVE
POC PH, URINE: 5 PH
POC PROTEIN, URINE: NEGATIVE MG/DL
POC SPECIFIC GRAVITY, URINE: 1.02
POC UROBILINOGEN, URINE: 0.2 EU/DL

## 2024-02-28 PROCEDURE — 88175 CYTOPATH C/V AUTO FLUID REDO: CPT

## 2024-02-28 PROCEDURE — 99395 PREV VISIT EST AGE 18-39: CPT | Performed by: OBSTETRICS & GYNECOLOGY

## 2024-02-28 PROCEDURE — 81003 URINALYSIS AUTO W/O SCOPE: CPT | Performed by: OBSTETRICS & GYNECOLOGY

## 2024-02-28 PROCEDURE — 3074F SYST BP LT 130 MM HG: CPT | Performed by: OBSTETRICS & GYNECOLOGY

## 2024-02-28 PROCEDURE — 3008F BODY MASS INDEX DOCD: CPT | Performed by: OBSTETRICS & GYNECOLOGY

## 2024-02-28 PROCEDURE — 4004F PT TOBACCO SCREEN RCVD TLK: CPT | Performed by: OBSTETRICS & GYNECOLOGY

## 2024-02-28 PROCEDURE — 3078F DIAST BP <80 MM HG: CPT | Performed by: OBSTETRICS & GYNECOLOGY

## 2024-02-28 RX ORDER — LISDEXAMFETAMINE DIMESYLATE 40 MG/1
40 CAPSULE ORAL
COMMUNITY
Start: 2024-02-22

## 2024-02-28 NOTE — PROGRESS NOTES
Radha Corley is a 33 y.o. female who is here for a routine exam. PCP = Kerrie Fall MD    Chief Complaint   Patient presents with    Gynecologic Exam     Patient is here for yearly exam and pap test. Patient does not do regular self breast exams and has no concerns at this time. LMP: 2/15/24.          Presents for annual exam. She voices no complaints and is doing well. Denies any bowel or bladder problems. Denies any breast problems.  She was recently started on birth control pills in anticipation of endometrial ablation.  Patient states she has a history of kidney stones.  She was recently treated for bladder infection and wished to have her urine checked today.  She had previous tubal sterilization.    OB History          3    Para   2    Term   2       0    AB   1    Living   2         SAB   1    IAB   0    Ectopic   0    Multiple   0    Live Births   2                  Social History     Substance and Sexual Activity   Sexual Activity Not on file     Current contraception: Tubal Sterilization    Past Medical History:   Diagnosis Date    Calculus of kidney 2021    Kidney stone on left side    Encounter for  delivery without indication     Delivery of pregnancy by  section    Encounter for gynecological examination (general) (routine) without abnormal findings     Pap test, as part of routine gynecological examination    Other conditions influencing health status     Menstruation    Pain in unspecified hip 2021    Hip pain, acute    Personal history of other complications of pregnancy, childbirth and the puerperium     History of miscarriage    Personal history of other diseases of urinary system 12/10/2020    History of hydronephrosis    Personal history of other specified conditions 12/10/2020    History of flank pain    Personal history of other specified conditions 2021    History of fatigue       Past Surgical History:   Procedure Laterality  "Date     SECTION, LOW TRANSVERSE      OTHER SURGICAL HISTORY  2021    Cyst excision    TUBAL LIGATION Bilateral 2014       Past med hx and past surg hx reviewed and notable for: none    Review of Systems:   Constitutional: No fever or chills  Respiratory: No shortness of breath, or cough  Cardiovascular: No chest pain or syncope  Breasts: No breast pain, no masses, no nipple discharge  Gastrointestinal: No nausea, vomiting, or diarrhea, no abdominal pain  Genitourinary: No dysuria or frequency  Gynecology: Negative except as noted in history of present illness  All other: All other systems reviewed and negative for complaint    Objective   /68   Ht 1.626 m (5' 4\")   Wt 107 kg (235 lb 12.8 oz)   LMP 02/15/2024   BMI 40.47 kg/m²     PHYSICAL EXAMINATION:  Well-developed, well nourished, in no acute distress, alert and oriented x three, is pleasant and cooperative.   HEENT: Clear. Pupils equal, round and reactive to light and accommodation. Extraocular muscles are intact. Oral mucosa pink without exudate.   NECK: No lymphadenopathy, no thyromegaly.  BREASTS: Symmetric, no palpable masses. No nipple discharge or retraction.  LUNGS: Clear bilaterally.  HEART: Regular rate and rhythm without murmurs.  ABDOMEN: Normoactive bowel sounds, soft and nontender, no guarding or rebound tenderness, no CVA tenderness.  EXTREMITIES: No clubbing, cyanosis or edema.  NEUROLOGIC:  Cranial nerves II-XII grossly intact.  :  Normal external female genitalia, normal vulva, normal vagina. Normal urethral meatus, urethra and bladder. Normal appearing cervix. Normal-sized uterus, no adnexal masses or tenderness. Pap smear performed today.      Actions performed during this visit include:  - Clinical breast exam  - Clinical pelvic exam  -   Orders Placed This Encounter   Procedures    POCT UA Automated manually resulted     Order Specific Question:   Release result to [employer]     Answer:   Immediate [1]        Problem " List Items Addressed This Visit    None  Visit Diagnoses       Encounter for gynecological examination without abnormal finding        Relevant Orders    THINPREP PAP TEST    Encounter for screening for cervical cancer        Relevant Orders    THINPREP PAP TEST    Pelvic pressure in female        Relevant Orders    POCT UA Automated manually resulted             Provider Impression:  1.  Annual  2.  Menorrhagia  Urine dipstick today shows some blood present.  No evidence of a bladder infection.  Patient given recommendation to see urologist regarding blood in your urine.    Thank you for coming to your annual exam. Your findings during the exam were normal.  Please return for your next visit in 1 week for endometrial biopsy and scheduling an endometrial ablation.

## 2024-02-29 DIAGNOSIS — I10 PRIMARY HYPERTENSION: ICD-10-CM

## 2024-02-29 RX ORDER — METOPROLOL SUCCINATE 50 MG/1
50 TABLET, EXTENDED RELEASE ORAL DAILY
Qty: 90 TABLET | Refills: 1 | Status: SHIPPED | OUTPATIENT
Start: 2024-02-29 | End: 2024-04-19 | Stop reason: SDUPTHER

## 2024-03-06 ENCOUNTER — PREP FOR PROCEDURE (OUTPATIENT)
Dept: OBSTETRICS AND GYNECOLOGY | Facility: CLINIC | Age: 34
End: 2024-03-06

## 2024-03-06 ENCOUNTER — PROCEDURE VISIT (OUTPATIENT)
Dept: OBSTETRICS AND GYNECOLOGY | Facility: CLINIC | Age: 34
End: 2024-03-06
Payer: COMMERCIAL

## 2024-03-06 VITALS
BODY MASS INDEX: 39.67 KG/M2 | HEIGHT: 64 IN | SYSTOLIC BLOOD PRESSURE: 124 MMHG | WEIGHT: 232.4 LBS | DIASTOLIC BLOOD PRESSURE: 64 MMHG

## 2024-03-06 DIAGNOSIS — Z32.02 PREGNANCY TEST NEGATIVE: ICD-10-CM

## 2024-03-06 DIAGNOSIS — N92.0 MENORRHAGIA WITH REGULAR CYCLE: Primary | ICD-10-CM

## 2024-03-06 LAB — PREGNANCY TEST URINE, POC: NEGATIVE

## 2024-03-06 PROCEDURE — 99213 OFFICE O/P EST LOW 20 MIN: CPT | Performed by: OBSTETRICS & GYNECOLOGY

## 2024-03-06 PROCEDURE — 81025 URINE PREGNANCY TEST: CPT | Performed by: OBSTETRICS & GYNECOLOGY

## 2024-03-06 RX ORDER — ACETAMINOPHEN 325 MG/1
975 TABLET ORAL ONCE
Status: CANCELLED | OUTPATIENT
Start: 2024-03-06 | End: 2024-03-06

## 2024-03-06 RX ORDER — GABAPENTIN 600 MG/1
600 TABLET ORAL ONCE
Status: CANCELLED | OUTPATIENT
Start: 2024-03-06 | End: 2024-03-06

## 2024-03-06 RX ORDER — CELECOXIB 400 MG/1
400 CAPSULE ORAL ONCE
Status: CANCELLED | OUTPATIENT
Start: 2024-03-06 | End: 2024-03-06

## 2024-03-06 NOTE — PROGRESS NOTES
Radha Corley is a 33 y.o. year old female patient.  PCP = Kerrie Fall MD    Chief Complaint   Patient presents with    Procedure    Pre-op Visit     Patient is here for endometrial biopsy and to scheduled an endometrial ablation. LMP: 2/15/24.       HPI   Presents for endometrial biopsy in anticipation of endometrial ablation.  Patient states that her menstrual flows have been heavy with clotting for at least the last year.  She was started on birth control pills approximately a month ago in anticipation of the endometrial ablation.  She had previous tubal sterilization.    OB History          3    Para   2    Term   2       0    AB   1    Living   2         SAB   1    IAB   0    Ectopic   0    Multiple   0    Live Births   2                 Past Medical History:   Diagnosis Date    Calculus of kidney 2021    Kidney stone on left side    Encounter for  delivery without indication     Delivery of pregnancy by  section    Encounter for gynecological examination (general) (routine) without abnormal findings     Pap test, as part of routine gynecological examination    Other conditions influencing health status     Menstruation    Pain in unspecified hip 2021    Hip pain, acute    Personal history of other complications of pregnancy, childbirth and the puerperium     History of miscarriage    Personal history of other diseases of urinary system 12/10/2020    History of hydronephrosis    Personal history of other specified conditions 12/10/2020    History of flank pain    Personal history of other specified conditions 2021    History of fatigue       Past Surgical History:   Procedure Laterality Date     SECTION, LOW TRANSVERSE      OTHER SURGICAL HISTORY  2021    Cyst excision    TUBAL LIGATION Bilateral        Review of Systems:   Constitutional: No fever or chills  Respiratory: No shortness of breath, or cough  Cardiovascular: No  chest pain or syncope  Breasts: No breast pain, no masses, no nipple discharge  Gastrointestinal: No nausea, vomiting, or diarrhea, no abdominal pain  Genitourinary: No dysuria or frequency  Gynecology: Negative except as noted in history of present illness  All other: All other systems reviewed and negative for complaint    Medication Documentation Review Audit       Reviewed by Andrea Butler MD (Physician) on 03/06/24 at 1446      Medication Order Taking? Sig Documenting Provider Last Dose Status   albuterol (Ventolin HFA) 90 mcg/actuation inhaler [id number]  Inhale 2 puffs every 4 hours if needed for wheezing or shortness of breath. Kerrie Fall MD  Active   cholecalciferol (Vitamin D-3) 5,000 Units tablet [id number] No Take 1 tablet (5,000 Units) by mouth once daily. Moody Love MD Taking Active   drospirenone-ethinyl estradioL (Purvi, Ocella) 3-0.03 mg tablet [id number] No Take 1 tablet by mouth once daily. Andrea Butler MD Taking Active   hydrOXYzine pamoate (Vistaril) 25 mg capsule [id number] No Take 1 capsule (25 mg) by mouth 3 times a day as needed for anxiety. Historical Provider, MD Taking Active   Discontinued 02/29/24 1733   metoprolol succinate XL (Toprol-XL) 50 mg 24 hr tablet [id number]  take 1 tablet by mouth once daily DO NOT CRUSH OR CHEW Kerrie Fall MD  Active   ondansetron ODT (Zofran-ODT) 4 mg disintegrating tablet [id number] No Take 1 tablet (4 mg) by mouth every 8 hours if needed for nausea or vomiting. Edward Goldberg, DO Taking Active   PARoxetine (Paxil) 20 mg tablet [id number] No Take 1 tablet (20 mg) by mouth once daily in the morning. Historical Provider, MD Taking Active   pyridoxine (Vitamin B-6) 100 mg tablet [id number] No Take 1 tablet (100 mg) by mouth once daily. Moody Provider, MD Taking Active   QUEtiapine (SEROquel) 400 mg tablet [id number] No Take 0.5 tablets (200 mg) by mouth once daily at bedtime. Historical Provider, MD Taking Active  "  spironolactone (Aldactone) 25 mg tablet [id number] No take 1 tablet by mouth once daily Kerrie Fall MD Taking Active   Vyvanse 40 mg capsule [id number]  Take 1 capsule (40 mg) by mouth once daily in the morning. Take before meals. Historical Provider, MD  Active                     /64   Ht 1.626 m (5' 4\")   Wt 105 kg (232 lb 6.4 oz)   LMP 02/15/2024   BMI 39.89 kg/m²     PHYSICAL EXAMINATION:  Well-developed, well nourished, in no acute distress, alert and oriented x three, is pleasant and cooperative.  HEENT: Clear. Pupils equal, round and reactive to light and accommodation. Extraocular muscles are intact. Oral mucosa pink without exudate.   NECK: No lymphadenopathy, no thyromegaly.  LUNGS: Clear bilaterally.  HEART: Regular rate and rhythm without murmurs.  ABDOMEN: Normoactive bowel sounds, soft and nontender, no guarding or rebound tenderness, no CVA tenderness.  EXTREMITIES: No clubbing, cyanosis or edema.  NEUROLOGIC:  Cranial nerves II-XII grossly intact.  :  Normal external female genitalia, normal vulva, normal vagina. Normal urethral meatus, urethra and bladder. Normal appearing cervix. Normal-sized uterus, no adnexal masses or tenderness.  Cervix was cleansed with Betadine solution and the anterior lip of the cervix was grasped with an Allis clamp.  Unable to sound the uterus due to the internal cervical os being narrow and patient's discomfort associated with the procedure.  Therefore, endometrial biopsy was not performed today.        Orders Placed This Encounter   Procedures    POCT pregnancy, urine manually resulted     Order Specific Question:   Release result to [facility]     Answer:   Immediate [1]        Problem List Items Addressed This Visit    None  Visit Diagnoses       Pregnancy test negative        Relevant Orders    POCT pregnancy, urine manually resulted (Completed)             Provider Impression:  1.  Menorrhagia  Recommend hysteroscopy, dilatation and curettage, " endometrial ablation with NovaSure.  She was informed that 50-75% of individuals will have no menstrual flow or lighter menstrual flow following the ablation and that the other 25% can still have heavy menstrual flows. Patient informed of risks of surgery including risk of anesthesia, infection, bleeding, injury to internal organs including bowel, bladder or ureter, uterine perforation or fistula formation. Her questions were answered to her satisfaction and she agrees to undergo the procedure.

## 2024-03-06 NOTE — H&P (VIEW-ONLY)
Radha Corley is a 33 y.o. year old female patient.  PCP = Kerrie Fall MD    Chief Complaint   Patient presents with    Procedure    Pre-op Visit     Patient is here for endometrial biopsy and to scheduled an endometrial ablation. LMP: 2/15/24.       HPI   Presents for endometrial biopsy in anticipation of endometrial ablation.  Patient states that her menstrual flows have been heavy with clotting for at least the last year.  She was started on birth control pills approximately a month ago in anticipation of the endometrial ablation.  She had previous tubal sterilization.    OB History          3    Para   2    Term   2       0    AB   1    Living   2         SAB   1    IAB   0    Ectopic   0    Multiple   0    Live Births   2                 Past Medical History:   Diagnosis Date    Calculus of kidney 2021    Kidney stone on left side    Encounter for  delivery without indication     Delivery of pregnancy by  section    Encounter for gynecological examination (general) (routine) without abnormal findings     Pap test, as part of routine gynecological examination    Other conditions influencing health status     Menstruation    Pain in unspecified hip 2021    Hip pain, acute    Personal history of other complications of pregnancy, childbirth and the puerperium     History of miscarriage    Personal history of other diseases of urinary system 12/10/2020    History of hydronephrosis    Personal history of other specified conditions 12/10/2020    History of flank pain    Personal history of other specified conditions 2021    History of fatigue       Past Surgical History:   Procedure Laterality Date     SECTION, LOW TRANSVERSE      OTHER SURGICAL HISTORY  2021    Cyst excision    TUBAL LIGATION Bilateral        Review of Systems:   Constitutional: No fever or chills  Respiratory: No shortness of breath, or cough  Cardiovascular: No  chest pain or syncope  Breasts: No breast pain, no masses, no nipple discharge  Gastrointestinal: No nausea, vomiting, or diarrhea, no abdominal pain  Genitourinary: No dysuria or frequency  Gynecology: Negative except as noted in history of present illness  All other: All other systems reviewed and negative for complaint    Medication Documentation Review Audit       Reviewed by Andrea Butler MD (Physician) on 03/06/24 at 1446      Medication Order Taking? Sig Documenting Provider Last Dose Status   albuterol (Ventolin HFA) 90 mcg/actuation inhaler [id number]  Inhale 2 puffs every 4 hours if needed for wheezing or shortness of breath. Kerrie Fall MD  Active   cholecalciferol (Vitamin D-3) 5,000 Units tablet [id number] No Take 1 tablet (5,000 Units) by mouth once daily. Moody Love MD Taking Active   drospirenone-ethinyl estradioL (Purvi, Ocella) 3-0.03 mg tablet [id number] No Take 1 tablet by mouth once daily. Andrea Butler MD Taking Active   hydrOXYzine pamoate (Vistaril) 25 mg capsule [id number] No Take 1 capsule (25 mg) by mouth 3 times a day as needed for anxiety. Historical Provider, MD Taking Active   Discontinued 02/29/24 1733   metoprolol succinate XL (Toprol-XL) 50 mg 24 hr tablet [id number]  take 1 tablet by mouth once daily DO NOT CRUSH OR CHEW Kerrie Fall MD  Active   ondansetron ODT (Zofran-ODT) 4 mg disintegrating tablet [id number] No Take 1 tablet (4 mg) by mouth every 8 hours if needed for nausea or vomiting. Edward Goldberg, DO Taking Active   PARoxetine (Paxil) 20 mg tablet [id number] No Take 1 tablet (20 mg) by mouth once daily in the morning. Historical Provider, MD Taking Active   pyridoxine (Vitamin B-6) 100 mg tablet [id number] No Take 1 tablet (100 mg) by mouth once daily. Moody Provider, MD Taking Active   QUEtiapine (SEROquel) 400 mg tablet [id number] No Take 0.5 tablets (200 mg) by mouth once daily at bedtime. Historical Provider, MD Taking Active  "  spironolactone (Aldactone) 25 mg tablet [id number] No take 1 tablet by mouth once daily Kerrie Fall MD Taking Active   Vyvanse 40 mg capsule [id number]  Take 1 capsule (40 mg) by mouth once daily in the morning. Take before meals. Historical Provider, MD  Active                     /64   Ht 1.626 m (5' 4\")   Wt 105 kg (232 lb 6.4 oz)   LMP 02/15/2024   BMI 39.89 kg/m²     PHYSICAL EXAMINATION:  Well-developed, well nourished, in no acute distress, alert and oriented x three, is pleasant and cooperative.  HEENT: Clear. Pupils equal, round and reactive to light and accommodation. Extraocular muscles are intact. Oral mucosa pink without exudate.   NECK: No lymphadenopathy, no thyromegaly.  LUNGS: Clear bilaterally.  HEART: Regular rate and rhythm without murmurs.  ABDOMEN: Normoactive bowel sounds, soft and nontender, no guarding or rebound tenderness, no CVA tenderness.  EXTREMITIES: No clubbing, cyanosis or edema.  NEUROLOGIC:  Cranial nerves II-XII grossly intact.  :  Normal external female genitalia, normal vulva, normal vagina. Normal urethral meatus, urethra and bladder. Normal appearing cervix. Normal-sized uterus, no adnexal masses or tenderness.  Cervix was cleansed with Betadine solution and the anterior lip of the cervix was grasped with an Allis clamp.  Unable to sound the uterus due to the internal cervical os being narrow and patient's discomfort associated with the procedure.  Therefore, endometrial biopsy was not performed today.        Orders Placed This Encounter   Procedures    POCT pregnancy, urine manually resulted     Order Specific Question:   Release result to [facility]     Answer:   Immediate [1]        Problem List Items Addressed This Visit    None  Visit Diagnoses       Pregnancy test negative        Relevant Orders    POCT pregnancy, urine manually resulted (Completed)             Provider Impression:  1.  Menorrhagia  Recommend hysteroscopy, dilatation and curettage, " endometrial ablation with NovaSure.  She was informed that 50-75% of individuals will have no menstrual flow or lighter menstrual flow following the ablation and that the other 25% can still have heavy menstrual flows. Patient informed of risks of surgery including risk of anesthesia, infection, bleeding, injury to internal organs including bowel, bladder or ureter, uterine perforation or fistula formation. Her questions were answered to her satisfaction and she agrees to undergo the procedure.

## 2024-03-07 NOTE — PREPROCEDURE INSTRUCTIONS
No outpatient medications have been marked as taking for the 3/14/24 encounter (Hospital Encounter).                       NPO Instructions:    Nothing to eat or drink after midnight    Additional Instructions:     Will need  home, will receive call day before surgery with arrival time

## 2024-03-12 LAB
CYTOLOGY CMNT CVX/VAG CYTO-IMP: NORMAL
LAB AP CONTRACEPTIVE HISTORY: NORMAL
LAB AP HPV GENOTYPE QUESTION: YES
LAB AP HPV HR: NORMAL
LABORATORY COMMENT REPORT: NORMAL
LMP START DATE: NORMAL
PATH REPORT.TOTAL CANCER: NORMAL

## 2024-03-13 ENCOUNTER — LAB (OUTPATIENT)
Dept: LAB | Facility: LAB | Age: 34
End: 2024-03-13
Payer: COMMERCIAL

## 2024-03-13 ENCOUNTER — ANESTHESIA EVENT (OUTPATIENT)
Dept: OPERATING ROOM | Facility: HOSPITAL | Age: 34
End: 2024-03-13
Payer: COMMERCIAL

## 2024-03-13 DIAGNOSIS — N92.0 MENORRHAGIA WITH REGULAR CYCLE: ICD-10-CM

## 2024-03-13 LAB
ERYTHROCYTE [DISTWIDTH] IN BLOOD BY AUTOMATED COUNT: 16.1 % (ref 11.5–14.5)
HCG UR QL IA.RAPID: NEGATIVE
HCT VFR BLD AUTO: 42.3 % (ref 36–46)
HGB BLD-MCNC: 14.1 G/DL (ref 12–16)
MCH RBC QN AUTO: 27.4 PG (ref 26–34)
MCHC RBC AUTO-ENTMCNC: 33.3 G/DL (ref 32–36)
MCV RBC AUTO: 82 FL (ref 80–100)
NRBC BLD-RTO: 0 /100 WBCS (ref 0–0)
PLATELET # BLD AUTO: 346 X10*3/UL (ref 150–450)
RBC # BLD AUTO: 5.14 X10*6/UL (ref 4–5.2)
WBC # BLD AUTO: 11 X10*3/UL (ref 4.4–11.3)

## 2024-03-13 PROCEDURE — 81025 URINE PREGNANCY TEST: CPT

## 2024-03-13 PROCEDURE — 85027 COMPLETE CBC AUTOMATED: CPT

## 2024-03-13 PROCEDURE — 36415 COLL VENOUS BLD VENIPUNCTURE: CPT

## 2024-03-13 NOTE — RESULT ENCOUNTER NOTE
Please inform patient that the Pap smear is normal.  Noted yeast which can be treated if she has symptoms.

## 2024-03-14 ENCOUNTER — ANESTHESIA (OUTPATIENT)
Dept: OPERATING ROOM | Facility: HOSPITAL | Age: 34
End: 2024-03-14
Payer: COMMERCIAL

## 2024-03-14 ENCOUNTER — HOSPITAL ENCOUNTER (OUTPATIENT)
Facility: HOSPITAL | Age: 34
Setting detail: OUTPATIENT SURGERY
Discharge: HOME | End: 2024-03-14
Attending: OBSTETRICS & GYNECOLOGY | Admitting: OBSTETRICS & GYNECOLOGY
Payer: COMMERCIAL

## 2024-03-14 VITALS
BODY MASS INDEX: 38.73 KG/M2 | OXYGEN SATURATION: 97 % | WEIGHT: 226.85 LBS | HEIGHT: 64 IN | HEART RATE: 84 BPM | SYSTOLIC BLOOD PRESSURE: 154 MMHG | RESPIRATION RATE: 16 BRPM | DIASTOLIC BLOOD PRESSURE: 109 MMHG | TEMPERATURE: 97 F

## 2024-03-14 DIAGNOSIS — N92.0 MENORRHAGIA WITH REGULAR CYCLE: ICD-10-CM

## 2024-03-14 PROBLEM — R11.2 PONV (POSTOPERATIVE NAUSEA AND VOMITING): Status: ACTIVE | Noted: 2024-03-14

## 2024-03-14 PROBLEM — Z98.890 PONV (POSTOPERATIVE NAUSEA AND VOMITING): Status: ACTIVE | Noted: 2024-03-14

## 2024-03-14 PROBLEM — K21.9 GASTROESOPHAGEAL REFLUX DISEASE: Status: ACTIVE | Noted: 2024-03-14

## 2024-03-14 PROCEDURE — 7100000001 HC RECOVERY ROOM TIME - INITIAL BASE CHARGE: Performed by: OBSTETRICS & GYNECOLOGY

## 2024-03-14 PROCEDURE — 2500000001 HC RX 250 WO HCPCS SELF ADMINISTERED DRUGS (ALT 637 FOR MEDICARE OP): Performed by: ANESTHESIOLOGY

## 2024-03-14 PROCEDURE — 2500000005 HC RX 250 GENERAL PHARMACY W/O HCPCS: Performed by: ANESTHESIOLOGY

## 2024-03-14 PROCEDURE — 3700000001 HC GENERAL ANESTHESIA TIME - INITIAL BASE CHARGE: Performed by: OBSTETRICS & GYNECOLOGY

## 2024-03-14 PROCEDURE — 3600000008 HC OR TIME - EACH INCREMENTAL 1 MINUTE - PROCEDURE LEVEL THREE: Performed by: OBSTETRICS & GYNECOLOGY

## 2024-03-14 PROCEDURE — 7100000002 HC RECOVERY ROOM TIME - EACH INCREMENTAL 1 MINUTE: Performed by: OBSTETRICS & GYNECOLOGY

## 2024-03-14 PROCEDURE — 7100000009 HC PHASE TWO TIME - INITIAL BASE CHARGE: Performed by: OBSTETRICS & GYNECOLOGY

## 2024-03-14 PROCEDURE — 7100000010 HC PHASE TWO TIME - EACH INCREMENTAL 1 MINUTE: Performed by: OBSTETRICS & GYNECOLOGY

## 2024-03-14 PROCEDURE — 88305 TISSUE EXAM BY PATHOLOGIST: CPT | Mod: TC,SUR,SAMLAB | Performed by: OBSTETRICS & GYNECOLOGY

## 2024-03-14 PROCEDURE — 3600000003 HC OR TIME - INITIAL BASE CHARGE - PROCEDURE LEVEL THREE: Performed by: OBSTETRICS & GYNECOLOGY

## 2024-03-14 PROCEDURE — 2500000004 HC RX 250 GENERAL PHARMACY W/ HCPCS (ALT 636 FOR OP/ED): Performed by: ANESTHESIOLOGY

## 2024-03-14 PROCEDURE — 2720000007 HC OR 272 NO HCPCS: Performed by: OBSTETRICS & GYNECOLOGY

## 2024-03-14 PROCEDURE — 2500000001 HC RX 250 WO HCPCS SELF ADMINISTERED DRUGS (ALT 637 FOR MEDICARE OP): Performed by: OBSTETRICS & GYNECOLOGY

## 2024-03-14 PROCEDURE — 58563 HYSTEROSCOPY ABLATION: CPT | Performed by: OBSTETRICS & GYNECOLOGY

## 2024-03-14 PROCEDURE — 88305 TISSUE EXAM BY PATHOLOGIST: CPT | Performed by: PATHOLOGY

## 2024-03-14 PROCEDURE — 3700000002 HC GENERAL ANESTHESIA TIME - EACH INCREMENTAL 1 MINUTE: Performed by: OBSTETRICS & GYNECOLOGY

## 2024-03-14 RX ORDER — FENTANYL CITRATE 50 UG/ML
INJECTION, SOLUTION INTRAMUSCULAR; INTRAVENOUS AS NEEDED
Status: DISCONTINUED | OUTPATIENT
Start: 2024-03-14 | End: 2024-03-14

## 2024-03-14 RX ORDER — ACETAMINOPHEN 325 MG/1
975 TABLET ORAL ONCE
Status: COMPLETED | OUTPATIENT
Start: 2024-03-14 | End: 2024-03-14

## 2024-03-14 RX ORDER — FAMOTIDINE 10 MG/ML
20 INJECTION INTRAVENOUS ONCE
Status: COMPLETED | OUTPATIENT
Start: 2024-03-14 | End: 2024-03-14

## 2024-03-14 RX ORDER — DEXAMETHASONE SODIUM PHOSPHATE 10 MG/ML
10 INJECTION INTRAMUSCULAR; INTRAVENOUS ONCE
Status: COMPLETED | OUTPATIENT
Start: 2024-03-14 | End: 2024-03-14

## 2024-03-14 RX ORDER — OXYCODONE HYDROCHLORIDE 5 MG/1
5 TABLET ORAL EVERY 4 HOURS PRN
Status: DISCONTINUED | OUTPATIENT
Start: 2024-03-14 | End: 2024-03-14 | Stop reason: HOSPADM

## 2024-03-14 RX ORDER — SCOLOPAMINE TRANSDERMAL SYSTEM 1 MG/1
1 PATCH, EXTENDED RELEASE TRANSDERMAL ONCE
Status: COMPLETED | OUTPATIENT
Start: 2024-03-14 | End: 2024-03-14

## 2024-03-14 RX ORDER — ONDANSETRON HYDROCHLORIDE 2 MG/ML
4 INJECTION, SOLUTION INTRAVENOUS ONCE AS NEEDED
Status: DISCONTINUED | OUTPATIENT
Start: 2024-03-14 | End: 2024-03-14 | Stop reason: HOSPADM

## 2024-03-14 RX ORDER — PHENYLEPHRINE HYDROCHLORIDE 10 MG/ML
INJECTION INTRAVENOUS AS NEEDED
Status: DISCONTINUED | OUTPATIENT
Start: 2024-03-14 | End: 2024-03-14

## 2024-03-14 RX ORDER — ONDANSETRON HYDROCHLORIDE 2 MG/ML
4 INJECTION, SOLUTION INTRAVENOUS ONCE
Status: COMPLETED | OUTPATIENT
Start: 2024-03-14 | End: 2024-03-14

## 2024-03-14 RX ORDER — LABETALOL HYDROCHLORIDE 5 MG/ML
5 INJECTION, SOLUTION INTRAVENOUS ONCE AS NEEDED
Status: COMPLETED | OUTPATIENT
Start: 2024-03-14 | End: 2024-03-14

## 2024-03-14 RX ORDER — GABAPENTIN 300 MG/1
600 CAPSULE ORAL ONCE
Status: COMPLETED | OUTPATIENT
Start: 2024-03-14 | End: 2024-03-14

## 2024-03-14 RX ORDER — SODIUM CHLORIDE, SODIUM LACTATE, POTASSIUM CHLORIDE, CALCIUM CHLORIDE 600; 310; 30; 20 MG/100ML; MG/100ML; MG/100ML; MG/100ML
100 INJECTION, SOLUTION INTRAVENOUS CONTINUOUS
Status: DISCONTINUED | OUTPATIENT
Start: 2024-03-14 | End: 2024-03-14 | Stop reason: HOSPADM

## 2024-03-14 RX ORDER — MIDAZOLAM HYDROCHLORIDE 1 MG/ML
2 INJECTION INTRAMUSCULAR; INTRAVENOUS ONCE
Status: COMPLETED | OUTPATIENT
Start: 2024-03-14 | End: 2024-03-14

## 2024-03-14 RX ORDER — PROPOFOL 10 MG/ML
INJECTION, EMULSION INTRAVENOUS AS NEEDED
Status: DISCONTINUED | OUTPATIENT
Start: 2024-03-14 | End: 2024-03-14

## 2024-03-14 RX ORDER — KETOROLAC TROMETHAMINE 30 MG/ML
30 INJECTION, SOLUTION INTRAMUSCULAR; INTRAVENOUS EVERY 6 HOURS PRN
Status: DISCONTINUED | OUTPATIENT
Start: 2024-03-14 | End: 2024-03-14 | Stop reason: HOSPADM

## 2024-03-14 RX ORDER — CELECOXIB 200 MG/1
400 CAPSULE ORAL ONCE
Status: COMPLETED | OUTPATIENT
Start: 2024-03-14 | End: 2024-03-14

## 2024-03-14 RX ORDER — ACETAMINOPHEN AND CODEINE PHOSPHATE 300; 30 MG/1; MG/1
1 TABLET ORAL EVERY 6 HOURS PRN
Qty: 10 TABLET | Refills: 0 | Status: SHIPPED | OUTPATIENT
Start: 2024-03-14 | End: 2024-03-21 | Stop reason: ALTCHOICE

## 2024-03-14 RX ORDER — SODIUM CHLORIDE, SODIUM LACTATE, POTASSIUM CHLORIDE, CALCIUM CHLORIDE 600; 310; 30; 20 MG/100ML; MG/100ML; MG/100ML; MG/100ML
75 INJECTION, SOLUTION INTRAVENOUS CONTINUOUS
Status: DISCONTINUED | OUTPATIENT
Start: 2024-03-14 | End: 2024-03-14 | Stop reason: HOSPADM

## 2024-03-14 RX ADMIN — Medication 5 MG: at 10:48

## 2024-03-14 RX ADMIN — PROPOFOL 170 MG: 10 INJECTION, EMULSION INTRAVENOUS at 10:29

## 2024-03-14 RX ADMIN — ONDANSETRON 4 MG: 2 INJECTION INTRAMUSCULAR; INTRAVENOUS at 09:41

## 2024-03-14 RX ADMIN — CELECOXIB 400 MG: 200 CAPSULE ORAL at 09:40

## 2024-03-14 RX ADMIN — Medication 20 MG: at 10:30

## 2024-03-14 RX ADMIN — KETOROLAC TROMETHAMINE 30 MG: 30 INJECTION, SOLUTION INTRAMUSCULAR at 12:47

## 2024-03-14 RX ADMIN — HYDROMORPHONE HYDROCHLORIDE 0.5 MG: 1 INJECTION, SOLUTION INTRAMUSCULAR; INTRAVENOUS; SUBCUTANEOUS at 12:00

## 2024-03-14 RX ADMIN — FENTANYL CITRATE 100 MCG: 50 INJECTION, SOLUTION INTRAMUSCULAR; INTRAVENOUS at 11:12

## 2024-03-14 RX ADMIN — HYDROMORPHONE HYDROCHLORIDE 0.5 MG: 1 INJECTION, SOLUTION INTRAMUSCULAR; INTRAVENOUS; SUBCUTANEOUS at 12:05

## 2024-03-14 RX ADMIN — ACETAMINOPHEN 975 MG: 325 TABLET ORAL at 09:40

## 2024-03-14 RX ADMIN — Medication 6 L/MIN: at 11:40

## 2024-03-14 RX ADMIN — DEXAMETHASONE SODIUM PHOSPHATE 10 MG: 10 INJECTION INTRAMUSCULAR; INTRAVENOUS at 09:40

## 2024-03-14 RX ADMIN — OXYCODONE HYDROCHLORIDE 5 MG: 5 TABLET ORAL at 14:01

## 2024-03-14 RX ADMIN — PHENYLEPHRINE HYDROCHLORIDE 50 MCG: 10 INJECTION INTRAVENOUS at 10:45

## 2024-03-14 RX ADMIN — SODIUM CHLORIDE, POTASSIUM CHLORIDE, SODIUM LACTATE AND CALCIUM CHLORIDE 75 ML/HR: 600; 310; 30; 20 INJECTION, SOLUTION INTRAVENOUS at 09:30

## 2024-03-14 RX ADMIN — PROPOFOL 10 MG: 10 INJECTION, EMULSION INTRAVENOUS at 10:48

## 2024-03-14 RX ADMIN — LABETALOL HYDROCHLORIDE 5 MG: 5 INJECTION INTRAVENOUS at 13:23

## 2024-03-14 RX ADMIN — FAMOTIDINE 20 MG: 10 INJECTION, SOLUTION INTRAVENOUS at 09:41

## 2024-03-14 RX ADMIN — PHENYLEPHRINE HYDROCHLORIDE 75 MCG: 10 INJECTION INTRAVENOUS at 10:34

## 2024-03-14 RX ADMIN — HYDROMORPHONE HYDROCHLORIDE 0.5 MG: 1 INJECTION, SOLUTION INTRAMUSCULAR; INTRAVENOUS; SUBCUTANEOUS at 12:42

## 2024-03-14 RX ADMIN — MIDAZOLAM HYDROCHLORIDE 2 MG: 1 INJECTION, SOLUTION INTRAMUSCULAR; INTRAVENOUS at 10:17

## 2024-03-14 RX ADMIN — PROPOFOL 20 MG: 10 INJECTION, EMULSION INTRAVENOUS at 10:35

## 2024-03-14 RX ADMIN — FENTANYL CITRATE 100 MCG: 50 INJECTION, SOLUTION INTRAMUSCULAR; INTRAVENOUS at 10:27

## 2024-03-14 RX ADMIN — GABAPENTIN 600 MG: 300 CAPSULE ORAL at 09:40

## 2024-03-14 RX ADMIN — HYDROMORPHONE HYDROCHLORIDE 0.5 MG: 1 INJECTION, SOLUTION INTRAMUSCULAR; INTRAVENOUS; SUBCUTANEOUS at 12:13

## 2024-03-14 RX ADMIN — SCOLOPAMINE TRANSDERMAL SYSTEM 1 PATCH: 1 PATCH, EXTENDED RELEASE TRANSDERMAL at 10:25

## 2024-03-14 SDOH — HEALTH STABILITY: MENTAL HEALTH: CURRENT SMOKER: 1

## 2024-03-14 ASSESSMENT — PAIN DESCRIPTION - DESCRIPTORS
DESCRIPTORS: CRAMPING

## 2024-03-14 ASSESSMENT — PAIN SCALES - GENERAL
PAINLEVEL_OUTOF10: 7
PAINLEVEL_OUTOF10: 3
PAINLEVEL_OUTOF10: 0 - NO PAIN
PAINLEVEL_OUTOF10: 4
PAINLEVEL_OUTOF10: 3
PAINLEVEL_OUTOF10: 6
PAINLEVEL_OUTOF10: 6
PAINLEVEL_OUTOF10: 0 - NO PAIN
PAINLEVEL_OUTOF10: 4
PAINLEVEL_OUTOF10: 0 - NO PAIN
PAIN_LEVEL: 0
PAINLEVEL_OUTOF10: 3
PAINLEVEL_OUTOF10: 6
PAINLEVEL_OUTOF10: 2
PAINLEVEL_OUTOF10: 5 - MODERATE PAIN

## 2024-03-14 ASSESSMENT — COLUMBIA-SUICIDE SEVERITY RATING SCALE - C-SSRS
6. HAVE YOU EVER DONE ANYTHING, STARTED TO DO ANYTHING, OR PREPARED TO DO ANYTHING TO END YOUR LIFE?: NO
1. IN THE PAST MONTH, HAVE YOU WISHED YOU WERE DEAD OR WISHED YOU COULD GO TO SLEEP AND NOT WAKE UP?: NO
2. HAVE YOU ACTUALLY HAD ANY THOUGHTS OF KILLING YOURSELF?: NO

## 2024-03-14 NOTE — DISCHARGE INSTRUCTIONS
Tylenol given before surgery at 9:40 am. Can have again after 3:40 pm    Celebrex was given before surgery at 9:40 am. Can have ibuprofen after 9:40 pm. Per pharmacist, 12 hours between ibuprofen and Celebrex is recommended.      Endometrial Ablation Discharge Instructions    About this topic  The uterus is the organ where a baby grows when you are pregnant. The uterus is also called the womb. You get rid of the lining of the uterus each time you have your period. The lining of your uterus is called the endometrium.  Endometrial ablation is a procedure that strips the uterine lining. Endometrial ablation uses heat, extreme coldness, or electrical stimulation.  You may need endometrial ablation:  If you have heavy bleeding each month  So you don't need a hysterectomy  If taking drugs to control your bleeding has not worked  What care is needed at home?  Ask your doctor what you need to do when you go home. Make sure you ask questions if you do not understand what you need to do.  Heat may be used on your lower belly to help lower your pain. If your doctor tells you to use heat, put a heating pad on your belly for no more than 20 minutes at a time. Do not sleep with a heating pad. You may get burned.  You can expect some bleeding from your vagina for a few weeks. You may use sanitary pads but not tampons or menstrual cups.  You may wash your vagina using soap and water. You may wash your vagina 24 hours after the operation or when your doctor tells you. Properly washing will help prevent infection.  Your doctor may give you a drug called estrogen to help heal the lining of the womb. You may have to take estrogen for a few weeks. Take the drug as ordered by your doctor.  What follow-up care is needed?  Your doctor may ask you to make visits to the office to check on your progress. Be sure to keep your visits.  Your doctor may set up a visit to see if all the lining was removed.  What drugs may be needed?  The doctor  may order drugs to:  Help with pain  Help heal the lining of the womb  Will physical activity be limited?  Rest for the first few days after the procedure. Avoid strenuous activities like heavy lifting and hard exercise. Ask your doctor when you may go back to your normal activities like work, driving, or sex.  What problems could happen?  Infection  Perforation of the uterus, bladder, or intestines  Cuts on the cervix  Bleeding  Too much thickening of the endometrium  Blood clots in your legs or lungs  No longer be able to become pregnant or stay pregnant  When do I need to call the doctor?  Signs of infection such as a fever of 100.4°F (38°C) or higher, chills.  Bad smelling drainage from the vagina  Very bad belly pain  Too much bleeding  Trouble passing urine  You are not feeling better in 2 to 3 days or you are feeling worse  Teach Back: Helping You Understand  The Teach Back Method helps you understand the information we are giving you. After you talk with the staff, tell them in your own words what you learned. This helps to make sure the staff has described each thing clearly. It also helps to explain things that may have been confusing. Before going home, make sure you can do these:  I can tell you about my procedure.  I can tell you what may help ease my pain.  I can tell you what I will do if I have a fever, chills, bad smelling drainage from the vagina, or bad belly pain.  Last Reviewed Date  2021-03-17  Consumer Information Use and Disclaimer  This generalized information is a limited summary of diagnosis, treatment, and/or medication information. It is not meant to be comprehensive and should be used as a tool to help the user understand and/or assess potential diagnostic and treatment options. It does NOT include all information about conditions, treatments, medications, side effects, or risks that may apply to a specific patient. It is not intended to be medical advice or a substitute for the medical  advice, diagnosis, or treatment of a health care provider based on the health care provider's examination and assessment of a patient’s specific and unique circumstances. Patients must speak with a health care provider for complete information about their health, medical questions, and treatment options, including any risks or benefits regarding use of medications. This information does not endorse any treatments or medications as safe, effective, or approved for treating a specific patient. [employer] and its affiliates disclaim any warranty or liability relating to this information or the use thereof. The use of this information is governed by the Terms of Use, available at https://www.[employer].com/en/know/clinical-effectiveness-terms  Copyright © 2024 [employer] and its affiliates and/or licensors. All rights reserved.

## 2024-03-14 NOTE — ANESTHESIA PROCEDURE NOTES
Airway  Date/Time: 3/14/2024 10:32 AM  Urgency: elective    Airway not difficult    Staffing  Performed: attending   Authorized by: Radhames Montgomery DO    Performed by: Radhames Montgomery DO  Patient location during procedure: OR    Indications and Patient Condition  Indications for airway management: anesthesia  Spontaneous Ventilation: absent  Sedation level: deep  Preoxygenated: yes  Patient position: sniffing  Mask difficulty assessment: 1 - vent by mask    Final Airway Details  Final airway type: supraglottic airway      Successful airway: Supreme  Size 4     Number of attempts at approach: 1    Additional Comments  Pre-O2.  Monitors.  Smooth IV induction.  Easy mask Vent with Oral Airway.  Easy atraumatic placement of LMA. Clear airway.

## 2024-03-14 NOTE — ANESTHESIA PREPROCEDURE EVALUATION
Patient: Radha Corley    Procedure Information       Anesthesia Start Date/Time: 03/14/24 1023    Procedure: Hysteroscopy, dilation and curettage, endometrial ablation with NovaSure    Location: [facility] OR 03 / Virtual [facility] OR    Surgeons: Andrea Butler MD            Relevant Problems   Anesthesia   (+) PONV (postoperative nausea and vomiting)   (-) Malignant hyperthermia      Cardiovascular   (+) Primary hypertension      Endocrine   (+) Class 3 severe obesity without serious comorbidity with body mass index (BMI) of 40.0 to 44.9 in adult (CMS/[facility])      GI   (+) Gastroesophageal reflux disease      /Renal (within normal limits)      Neuro/Psych   (+) Bipolar depression (CMS/[facility])   (+) Depression      Pulmonary (within normal limits)      Hematology   (+) Iron deficiency anemia      Musculoskeletal (within normal limits)      Eyes, Ears, Nose, and Throat (within normal limits)   Hx PONZ, Pre-medication meds and scopolamin patch applied  ADHD- V[facility]vance  Clinical information reviewed:   Tobacco  Allergies  Meds   Med Hx  Surg Hx   Fam Hx  Soc Hx    Smoker 1/2 ppd  Bronchitis about 6 weeks ago.  Back to baseline.  NPO Detail:  NPO/Void Status  Carbohydrate Drink Given Prior to Surgery? : N  Date of Last Liquid: 03/13/24  Time of Last Liquid: 2359  Date of Last Solid: 03/13/24  Time of Last Solid: 1800  Last Intake Type: Clear fluids  Time of Last Void: 0900         Physical Exam    Airway  Mallampati: II  TM distance: >3 FB  Neck ROM: full     Cardiovascular   Rhythm: regular  Rate: normal     Dental   Comments: Intact crown and fillings   Pulmonary   Breath sounds clear to auscultation     Abdominal            Anesthesia Plan    History of general anesthesia?: yes  History of complications of general anesthesia?: no    ASA 2     general     The patient is a current smoker.  Patient did not smoke on day of procedure.    intravenous induction   Anesthetic plan and risks discussed with patient.    GA  discussed with Patient.  Plan and process discussed for anesthesia for procedure.  Risks and benefits discussed.  All questions answered with patient.  The patient understands plan and wishes to proceed. Hx of PONV.  Scopolamine patch applied and pre-op medications.

## 2024-03-14 NOTE — OP NOTE
Hysteroscopy, dilation and curettage, endometrial ablation with HYDROTHERMAL Operative Note     Date: 3/14/2024  OR Location: [facility] OR    Name: Radha Corley : 1990, Age: 33 y.o., MRN: [id number], Sex: female    Diagnosis  Pre-op Diagnosis     * Menorrhagia with regular cycle [N92.0] Post-op Diagnosis     * Menorrhagia with regular cycle [N92.0]     Procedures  Hysteroscopy, dilation and curettage, endometrial ablation with HYDROTHERMAL  27420 - PA HYSTEROSCOPY ENDOMETRIAL ABLATION      Surgeons      * Andrea Butler - Primary    Resident/Fellow/Other Assistant:  Surgeon(s) and Role:    Procedure Summary  Anesthesia: General  ASA: ASA status not filed in the log.  Anesthesia Staff: Anesthesiologist: Radhames Montgomery DO  Estimated Blood Loss: 5mL  Intra-op Medications:   Administrations occurring from 1020 to 1120 on 24:   Medication Name Total Dose   lactated Ringer's infusion Cannot be calculated   scopolamine (Transderm-Scop) patch 1 patch 1 patch              Anesthesia Record               Intraprocedure I/O Totals          Intake    lactated Ringer's infusion 1300.00 mL    Total Intake 1300 mL          Specimen:   ID Type Source Tests Collected by Time   1 : ENDOMETRIAL CURETTINGS Tissue ENDOMETRIUM CURETTINGS SURGICAL PATHOLOGY EXAM Andrea Butler MD 3/14/2024 1044        Staff:   Circulator: Mekhi Lucas RN  Scrub Person: Griselda Hurtado         Drains and/or Catheters:   [REMOVED] Urethral Catheter Non-latex 16 Fr. (Removed)       Tourniquet Times:         Implants:     Findings: Normal size anteverted uterus.  Noted menstrual blood per vagina.  Somewhat difficult to inspect the endometrial cavity secondary to menstrual blood.    Indications: Radha Corley is an 33 y.o. female who is having surgery for Menorrhagia with regular cycle [N92.0].     The patient was seen in the preoperative area. The risks, benefits, complications, treatment options, non-operative alternatives,  expected recovery and outcomes were discussed with the patient. The possibilities of reaction to medication, pulmonary aspiration, injury to surrounding structures, bleeding, recurrent infection, the need for additional procedures, failure to diagnose a condition, and creating a complication requiring transfusion or operation were discussed with the patient. The patient concurred with the proposed plan, giving informed consent.  The site of surgery was properly noted/marked if necessary per policy. The patient has been actively warmed in preoperative area. Preoperative antibiotics are not indicated. Venous thrombosis prophylaxis have been ordered including bilateral sequential compression devices    Procedure Details:  Procedure:  The patient was brought in to the operating room, and placed in the supine position. Sequentials compression devices were placed on the lower extremities. After an adequate level of general anesthesia, she was then placed in the low lithotomy position.  The perineum and vagina were prepped and draped in the usual sterile fashion.  Bimanual exam was performed, and above findings noted.  The urinary bladder was drained using a straight red Basilio catheter.  A weighted speculum was placed in the vagina.  The anterior vaginal wall was elevated.   The anterior lip of the cervix was grasped with a single tooth tenaculum. The uterus was sounded to 7 cm.  The cervix was gradually dilated using Christopher dilators.    Hysteroscope was introduced through the cervix into the uterine cavity due to menstrual blood in the endometrial cavity this made visualization somewhat challenging.  Tubal ostia were identified.  No evidence of submucous fibroids or polyps.  Endometrial curettings were then obtained and taken off the field.  At this point the NovaSure device was introduced into the uterine cavity but due to the fact that the array could not deploy more than 2 cm, the NovaSure cannot be utilized for the  ablation.  Therefore, it was decided to use the hydrothermal technique.    The hysteroscope with the hydrothermal apparatus was then introduced through the cervix into the uterine cavity.  The endocervical and endometrial cavity were inspected and above findings noted.  After the hysteroscopic portion of the procedure was finished, then the hydrothermal ablation was performed for the 10-minute treatment cycle of the endometrial cavity.  After the cool down phase was completed, the hydrothermal ablation apparatus and hysteroscope were then removed.  At this point the procedure was ended.        The hysteroscope, single tooth tenaculum and weighted speculum were all removed. Sponge and needle counts were correct x 2.  Endometrial curettings sent to pathology for further analysis.  The patient tolerated the procedure well and was taken to PACU in good condition. Patient to receive Tylenol #3 one tablet po every 4 hours PRN (10).  Patient to follow-up in office in 2 weeks.      Complications:  None; patient tolerated the procedure well.    Disposition: PACU - hemodynamically stable.  Condition: stable         Additional Details:     Attending Attestation:     Andrea Butler  Phone Number: [phone]

## 2024-03-14 NOTE — ANESTHESIA POSTPROCEDURE EVALUATION
Patient: Radha Corley    Procedure Summary       Date: 03/14/24 Room / Location: [facility] OR 03 / Virtual [facility] OR    Anesthesia Start: 1023 Anesthesia Stop: 1143    Procedure: Hysteroscopy, dilation and curettage, endometrial ablation with HYDROTHERMAL Diagnosis:       Menorrhagia with regular cycle      (Menorrhagia with regular cycle [N92.0])    Surgeons: Andrea Butler MD Responsible Provider: Radhames Montgomery DO    Anesthesia Type: general ASA Status: 2            Anesthesia Type: general    Vitals Value Taken Time   /90 03/14/24 1150   Temp 36.2 °C (97.1 °F) 03/14/24 1140   Pulse 82 03/14/24 1150   Resp 22 03/14/24 1150   SpO2 96 % 03/14/24 1150       Anesthesia Post Evaluation    Patient location during evaluation: bedside  Patient participation: complete - patient participated  Level of consciousness: awake and sleepy but conscious  Pain score: 0  Pain management: adequate  Multimodal analgesia pain management approach  Airway patency: patent  Cardiovascular status: acceptable  Respiratory status: acceptable and face mask  Hydration status: acceptable  Postoperative Nausea and Vomiting: none  Comments: Easily awakens.  VSS.  Denies pain or nausea.        No notable events documented.

## 2024-03-20 ENCOUNTER — LAB (OUTPATIENT)
Dept: LAB | Facility: LAB | Age: 34
End: 2024-03-20
Payer: COMMERCIAL

## 2024-03-20 DIAGNOSIS — D50.9 IRON DEFICIENCY ANEMIA, UNSPECIFIED IRON DEFICIENCY ANEMIA TYPE: ICD-10-CM

## 2024-03-20 DIAGNOSIS — R39.9 URINARY SYMPTOM OR SIGN: ICD-10-CM

## 2024-03-20 DIAGNOSIS — R20.8 BURNING SENSATION OF MOUTH: ICD-10-CM

## 2024-03-20 LAB
APPEARANCE UR: ABNORMAL
BILIRUB UR STRIP.AUTO-MCNC: NEGATIVE MG/DL
COLOR UR: YELLOW
FOLATE SERPL-MCNC: 12.8 NG/ML
GLUCOSE UR STRIP.AUTO-MCNC: NEGATIVE MG/DL
KETONES UR STRIP.AUTO-MCNC: ABNORMAL MG/DL
LEUKOCYTE ESTERASE UR QL STRIP.AUTO: NEGATIVE
NITRITE UR QL STRIP.AUTO: NEGATIVE
PH UR STRIP.AUTO: 5 [PH]
PROT UR STRIP.AUTO-MCNC: NEGATIVE MG/DL
RBC # UR STRIP.AUTO: NEGATIVE /UL
SP GR UR STRIP.AUTO: 1.01
UROBILINOGEN UR STRIP.AUTO-MCNC: <2 MG/DL

## 2024-03-20 PROCEDURE — 82746 ASSAY OF FOLIC ACID SERUM: CPT

## 2024-03-20 PROCEDURE — 81003 URINALYSIS AUTO W/O SCOPE: CPT

## 2024-03-20 PROCEDURE — 36415 COLL VENOUS BLD VENIPUNCTURE: CPT

## 2024-03-21 ENCOUNTER — OFFICE VISIT (OUTPATIENT)
Dept: HEMATOLOGY/ONCOLOGY | Facility: CLINIC | Age: 34
End: 2024-03-21
Payer: COMMERCIAL

## 2024-03-21 VITALS
DIASTOLIC BLOOD PRESSURE: 85 MMHG | OXYGEN SATURATION: 98 % | HEIGHT: 64 IN | BODY MASS INDEX: 38.14 KG/M2 | TEMPERATURE: 95.9 F | SYSTOLIC BLOOD PRESSURE: 131 MMHG | HEART RATE: 96 BPM | RESPIRATION RATE: 16 BRPM | WEIGHT: 223.4 LBS

## 2024-03-21 DIAGNOSIS — R39.9 URINARY SYMPTOM OR SIGN: ICD-10-CM

## 2024-03-21 DIAGNOSIS — R20.8 BURNING SENSATION OF MOUTH: ICD-10-CM

## 2024-03-21 DIAGNOSIS — D50.9 IRON DEFICIENCY ANEMIA, UNSPECIFIED IRON DEFICIENCY ANEMIA TYPE: Primary | ICD-10-CM

## 2024-03-21 LAB
ALBUMIN SERPL BCP-MCNC: 4.3 G/DL (ref 3.4–5)
ALP SERPL-CCNC: 63 U/L (ref 33–110)
ALT SERPL W P-5'-P-CCNC: 24 U/L (ref 7–45)
ANION GAP SERPL CALC-SCNC: 12 MMOL/L (ref 10–20)
AST SERPL W P-5'-P-CCNC: 17 U/L (ref 9–39)
BASOPHILS # BLD AUTO: 0.06 X10*3/UL (ref 0–0.1)
BASOPHILS NFR BLD AUTO: 0.5 %
BILIRUB SERPL-MCNC: 0.5 MG/DL (ref 0–1.2)
BUN SERPL-MCNC: 12 MG/DL (ref 6–23)
CALCIUM SERPL-MCNC: 9.4 MG/DL (ref 8.6–10.3)
CHLORIDE SERPL-SCNC: 102 MMOL/L (ref 98–107)
CO2 SERPL-SCNC: 26 MMOL/L (ref 21–32)
CREAT SERPL-MCNC: 0.82 MG/DL (ref 0.5–1.05)
EGFRCR SERPLBLD CKD-EPI 2021: >90 ML/MIN/1.73M*2
EOSINOPHIL # BLD AUTO: 0.55 X10*3/UL (ref 0–0.7)
EOSINOPHIL NFR BLD AUTO: 5 %
ERYTHROCYTE [DISTWIDTH] IN BLOOD BY AUTOMATED COUNT: 15.9 % (ref 11.5–14.5)
FERRITIN SERPL-MCNC: 197 NG/ML (ref 8–150)
GLUCOSE SERPL-MCNC: 83 MG/DL (ref 74–99)
HCT VFR BLD AUTO: 41.1 % (ref 36–46)
HGB BLD-MCNC: 13.7 G/DL (ref 12–16)
IMM GRANULOCYTES # BLD AUTO: 0.03 X10*3/UL (ref 0–0.7)
IMM GRANULOCYTES NFR BLD AUTO: 0.3 % (ref 0–0.9)
IRON SATN MFR SERPL: 21 % (ref 25–45)
IRON SERPL-MCNC: 74 UG/DL (ref 35–150)
LABORATORY COMMENT REPORT: NORMAL
LYMPHOCYTES # BLD AUTO: 3.38 X10*3/UL (ref 1.2–4.8)
LYMPHOCYTES NFR BLD AUTO: 31 %
MCH RBC QN AUTO: 27.5 PG (ref 26–34)
MCHC RBC AUTO-ENTMCNC: 33.3 G/DL (ref 32–36)
MCV RBC AUTO: 83 FL (ref 80–100)
MONOCYTES # BLD AUTO: 0.6 X10*3/UL (ref 0.1–1)
MONOCYTES NFR BLD AUTO: 5.5 %
NEUTROPHILS # BLD AUTO: 6.3 X10*3/UL (ref 1.2–7.7)
NEUTROPHILS NFR BLD AUTO: 57.7 %
NRBC BLD-RTO: 0 /100 WBCS (ref 0–0)
PATH REPORT.FINAL DX SPEC: NORMAL
PATH REPORT.GROSS SPEC: NORMAL
PATH REPORT.RELEVANT HX SPEC: NORMAL
PATH REPORT.TOTAL CANCER: NORMAL
PLATELET # BLD AUTO: 342 X10*3/UL (ref 150–450)
POTASSIUM SERPL-SCNC: 3.7 MMOL/L (ref 3.5–5.3)
PROT SERPL-MCNC: 6.6 G/DL (ref 6.4–8.2)
RBC # BLD AUTO: 4.98 X10*6/UL (ref 4–5.2)
SODIUM SERPL-SCNC: 136 MMOL/L (ref 136–145)
TIBC SERPL-MCNC: 359 UG/DL (ref 240–445)
UIBC SERPL-MCNC: 285 UG/DL (ref 110–370)
VIT B12 SERPL-MCNC: 392 PG/ML (ref 211–911)
WBC # BLD AUTO: 10.9 X10*3/UL (ref 4.4–11.3)

## 2024-03-21 PROCEDURE — 82728 ASSAY OF FERRITIN: CPT

## 2024-03-21 PROCEDURE — 99213 OFFICE O/P EST LOW 20 MIN: CPT

## 2024-03-21 PROCEDURE — 4004F PT TOBACCO SCREEN RCVD TLK: CPT

## 2024-03-21 PROCEDURE — 85025 COMPLETE CBC W/AUTO DIFF WBC: CPT

## 2024-03-21 PROCEDURE — 82607 VITAMIN B-12: CPT

## 2024-03-21 PROCEDURE — 3079F DIAST BP 80-89 MM HG: CPT

## 2024-03-21 PROCEDURE — 3008F BODY MASS INDEX DOCD: CPT

## 2024-03-21 PROCEDURE — 3075F SYST BP GE 130 - 139MM HG: CPT

## 2024-03-21 PROCEDURE — 36415 COLL VENOUS BLD VENIPUNCTURE: CPT

## 2024-03-21 PROCEDURE — 84075 ASSAY ALKALINE PHOSPHATASE: CPT

## 2024-03-21 PROCEDURE — 83540 ASSAY OF IRON: CPT

## 2024-03-21 ASSESSMENT — PATIENT HEALTH QUESTIONNAIRE - PHQ9
SUM OF ALL RESPONSES TO PHQ9 QUESTIONS 1 AND 2: 0
1. LITTLE INTEREST OR PLEASURE IN DOING THINGS: NOT AT ALL
2. FEELING DOWN, DEPRESSED OR HOPELESS: NOT AT ALL

## 2024-03-21 ASSESSMENT — PAIN SCALES - GENERAL: PAINLEVEL: 3

## 2024-03-21 NOTE — PROGRESS NOTES
Labs drawn today= CNP to review- will call pt with follow up plans  Referral faxed to [facility]- dx itchy tongue- they will call pt to schedule- pt is aware    Labs at hosp  prior to next appt  Rtc for CNP visit 6/25 at 930

## 2024-03-21 NOTE — PATIENT INSTRUCTIONS
Referral to ENT  Labs today  Iron parameters have improved will continue to monitor labs  RTC in 3 months with labs prior     Labs: CBC w/diff, CMP, Ferritin, Iron Panel, B12

## 2024-03-21 NOTE — PROGRESS NOTES
Patient ID: Radha Corley is a 33 y.o. female.    Subjective    HPI       Patient is a 32 yo HTN, bipolar, borderline personality disorder, ADHD, Autism, Vitamin D Defeincey with a PMH of  and was referred to benign hematology for consultation of iron deficiency.      Patient reports that she has struggled with iron deficiency for awhile. In the past she has required IV infusions. Patient's CBC on 10/28/23 results Hg 12.2, MCV 80, MCHC 31.3, indicating microcytic anemia, remainder of CBC are stable.  Iron parameters on 10/28/23 results were Fe 33, Fe sat 7%, Ferritin- no results reported- indicating iron deficiency. Patient was started on oral iron, she admits not always being compliant due to GI side effects.      Today, patient presents for initial consultation. Patient reports poor energy, able to work outside the home and complete ADLs, always feels fatigues. Appetite is poor due to tongue soreness, heaviness and burning sensation. Occasional nausea due to change in anxiety/depression medications, Denies any vomiting. Chronic diarrhea, no hematochezia or melenic stools.  No abdominal pain, cramping or early satiety. Denies any urinary issues, dysuria or hematuria.  Craves ice daily.  Denies abnormal weight loss, night sweats, fever. Recently treated for bronchitis and staph infection, resolved at this time.  Denies fatigue, chills, sob, early satiety. Denies any abnormal bleeding or bruising. No recurrent infections or lymphadenopathy. No bone pain. Denies any brittle/dry/or hair loss, nails have always peeled and been soft/weak. No known blood disorders in family. Has had surgery in past w/o issue.      Patient reports started menses at age 10. Menstrual cycle is irregular, typically last 7 days with heavy bleeding and clotting. Patient is scheduled to see OB/GYN this week to discuss treatment options.      up to date on cancer screenings-      Interval History 3/21/24     Appetite is decent. Slight  improved with swelling/burning tongue. Chronic diarrhea and nausea due to medications, but has increased since recent procedure. Uterine Ablation done on almost week ago, mild cramping spotting. No urinary issues, dysuria, hematuria. Part 3 days reports night sweats. Weight is stable. Remains smoke.  Energy remains low, fatigues easily.     Fainting/Nausea or lightheadedness/dizziness is worse with activity. Drinking lots of water.      PAST MEDICAL HISTORY:  depression/anxiety, rosacea, allergic rhinitis, obesity     SOCIAL HISTORY:  Regular Diet  Smoker- 1/2 ppd cigarettes and marijuana - +1 year   ETOH- None  (3) pregnancies  she has 2 children  STNA     FAMILY HISTORY:  grandfather and 2 uncles had colon cancer, uncles were in their 50s  aunt had breast cancer, she was in her 40s  No other specific history of bleeding, clotting or malignant disorder in the     Objective    BSA: There is no height or weight on file to calculate BSA.  LMP 02/15/2024      Physical Exam  Vitals and nursing note reviewed.   Constitutional:       Appearance: Normal appearance.   HENT:      Head: Normocephalic and atraumatic.      Mouth/Throat:      Pharynx: Oropharynx is clear.   Eyes:      General: No scleral icterus.     Extraocular Movements: Extraocular movements intact.      Conjunctiva/sclera: Conjunctivae normal.   Cardiovascular:      Rate and Rhythm: Normal rate and regular rhythm.      Pulses: Normal pulses.      Heart sounds: Normal heart sounds.   Pulmonary:      Effort: Pulmonary effort is normal.      Breath sounds: Wheezing present.   Abdominal:      General: There is no distension.      Palpations: Abdomen is soft.      Tenderness: There is no abdominal tenderness.   Musculoskeletal:         General: Normal range of motion.      Cervical back: Normal range of motion and neck supple.   Skin:     General: Skin is warm and dry.   Neurological:      General: No focal deficit present.      Mental Status: She is alert and  oriented to person, place, and time.   Psychiatric:         Mood and Affect: Mood normal.         Behavior: Behavior normal.         Thought Content: Thought content normal.         Judgment: Judgment normal.       Performance Status:  Asymptomatic      Assessment/Plan   1. Microcytic anemia  most likely iron deficiency anemia secondary to menstrual blood losses  she follows Maritza Olivarez, OB/gyn  will plan to verify iron deficiency status with ferritin, iron, TIBC levels  check additional anemia labs:  -B12 and folic acid  -LDH, retic and haptoglobin  - ZINA, Rh factor  -TSH     Plan for IV iron replacement if iron deficiency confirmed, she has already GI  complaints and would like to avoid any potential exacerbation with PO iron     Patient was provided educaton on IV iron without premedication, observation for hypersensitivity reaction for 30 minutes post infusion. Side effects of Feraheme were explained, including but are not limited to hypotension, edema, chest pain, hypertension, dizziness, headache, fatigue, pruritus, rash, diarrhea, nausea, constipation, vomiting, abdominal pain, hypersensitivity reaction which can be severe and potentially life-threatening, pain, muscle spasm, shortness of breath, cough, fever, anaphylaxis, angioedema, arrhythmia, cardiac failure, ischemic heart disease, syncope, urticaria-patient agrees to proceed if needed.       Hematological Labs were essentially negative expect for iron deficiency. Iron parameters- Fe 37, Fe sat 8%, Ferritin 18. CBC was stable, Hg 12.3, WBC 9.7, Platelets 432k. No hemolysis was present. Folate, TSH, B12, ZINA, and Rheumatoid factors were normal.    Interval 3/21/24:     Interval 3/21/24    Patient is post iron infusions, well tolerated. Patient recently had a uterine ablation a week ago- remains to have light spotting and mild cramping. Otherwise she reports that she feels generally well. She has episodes of feeling nausea, and flushed. Since her procedure  she has experienced a few episodes of almost fainting/lightheaded-dizziness with nausea, seems to be worse with activity. She reports a flank pain of a 3 today, she denies any hematuria or dysuria. Night sweats over the past few days, denies any fever. Her tongue remains to feel swollen with a burning sensation. Patient remains to have chronic diarrhea, and nausea due to medications.     Patient was previously referred to ENT, however local ENT does not accept patient insurance. Will submit a new referral, advised patient to contact insurance to verify a provider in network.    No labs reported. Will plan to draw labs today-CBC w/diff, CMP, Ferritin, Iron, B12     Reviewed labs:  3/21/24- Hg 13.7, MCV 83, MCHC 33.3, WBC 10.9, Plt 342k  Fe 74, Fe sat 21%, Ferritin 197, B12 392    Iron parameters have improved. Will continue to monitor patients labs for replacement efficacy.      2. Anxiety/ Depression   Follows psychiatry     3. Diarrhea  Advised follow-up with GI as needed         Plan:  Referral to ENT  Labs today   RTC in 3 months with labs prior        SANTOS Moise-CNP

## 2024-03-27 ENCOUNTER — HOSPITAL ENCOUNTER (OUTPATIENT)
Facility: HOSPITAL | Age: 34
Setting detail: OBSERVATION
Discharge: HOME | End: 2024-03-28
Attending: EMERGENCY MEDICINE | Admitting: INTERNAL MEDICINE
Payer: COMMERCIAL

## 2024-03-27 ENCOUNTER — APPOINTMENT (OUTPATIENT)
Dept: RADIOLOGY | Facility: HOSPITAL | Age: 34
End: 2024-03-27
Payer: COMMERCIAL

## 2024-03-27 DIAGNOSIS — K52.9 ENTERITIS: Primary | ICD-10-CM

## 2024-03-27 LAB
ALBUMIN SERPL BCP-MCNC: 4.5 G/DL (ref 3.4–5)
ALP SERPL-CCNC: 60 U/L (ref 33–110)
ALT SERPL W P-5'-P-CCNC: 30 U/L (ref 7–45)
ANION GAP SERPL CALC-SCNC: 14 MMOL/L (ref 10–20)
AST SERPL W P-5'-P-CCNC: 16 U/L (ref 9–39)
BASOPHILS # BLD AUTO: 0.05 X10*3/UL (ref 0–0.1)
BASOPHILS NFR BLD AUTO: 0.2 %
BILIRUB SERPL-MCNC: 0.6 MG/DL (ref 0–1.2)
BUN SERPL-MCNC: 14 MG/DL (ref 6–23)
CALCIUM SERPL-MCNC: 9.6 MG/DL (ref 8.6–10.3)
CHLORIDE SERPL-SCNC: 104 MMOL/L (ref 98–107)
CO2 SERPL-SCNC: 24 MMOL/L (ref 21–32)
CREAT SERPL-MCNC: 0.84 MG/DL (ref 0.5–1.05)
EGFRCR SERPLBLD CKD-EPI 2021: >90 ML/MIN/1.73M*2
EOSINOPHIL # BLD AUTO: 0.34 X10*3/UL (ref 0–0.7)
EOSINOPHIL NFR BLD AUTO: 1.4 %
ERYTHROCYTE [DISTWIDTH] IN BLOOD BY AUTOMATED COUNT: 16 % (ref 11.5–14.5)
FLUAV RNA RESP QL NAA+PROBE: NOT DETECTED
FLUBV RNA RESP QL NAA+PROBE: NOT DETECTED
GLUCOSE SERPL-MCNC: 89 MG/DL (ref 74–99)
HCT VFR BLD AUTO: 46.7 % (ref 36–46)
HGB BLD-MCNC: 15.5 G/DL (ref 12–16)
IMM GRANULOCYTES # BLD AUTO: 0.11 X10*3/UL (ref 0–0.7)
IMM GRANULOCYTES NFR BLD AUTO: 0.4 % (ref 0–0.9)
LYMPHOCYTES # BLD AUTO: 1.01 X10*3/UL (ref 1.2–4.8)
LYMPHOCYTES NFR BLD AUTO: 4.1 %
MCH RBC QN AUTO: 27.7 PG (ref 26–34)
MCHC RBC AUTO-ENTMCNC: 33.2 G/DL (ref 32–36)
MCV RBC AUTO: 84 FL (ref 80–100)
MONOCYTES # BLD AUTO: 0.68 X10*3/UL (ref 0.1–1)
MONOCYTES NFR BLD AUTO: 2.8 %
NEUTROPHILS # BLD AUTO: 22.38 X10*3/UL (ref 1.2–7.7)
NEUTROPHILS NFR BLD AUTO: 91.1 %
NRBC BLD-RTO: 0 /100 WBCS (ref 0–0)
PLATELET # BLD AUTO: 356 X10*3/UL (ref 150–450)
POTASSIUM SERPL-SCNC: 4.2 MMOL/L (ref 3.5–5.3)
PROT SERPL-MCNC: 7.2 G/DL (ref 6.4–8.2)
RBC # BLD AUTO: 5.59 X10*6/UL (ref 4–5.2)
SARS-COV-2 RNA RESP QL NAA+PROBE: NOT DETECTED
SODIUM SERPL-SCNC: 138 MMOL/L (ref 136–145)
WBC # BLD AUTO: 24.6 X10*3/UL (ref 4.4–11.3)

## 2024-03-27 PROCEDURE — 81003 URINALYSIS AUTO W/O SCOPE: CPT | Performed by: EMERGENCY MEDICINE

## 2024-03-27 PROCEDURE — 85025 COMPLETE CBC W/AUTO DIFF WBC: CPT | Performed by: EMERGENCY MEDICINE

## 2024-03-27 PROCEDURE — 84443 ASSAY THYROID STIM HORMONE: CPT | Performed by: EMERGENCY MEDICINE

## 2024-03-27 PROCEDURE — 96361 HYDRATE IV INFUSION ADD-ON: CPT

## 2024-03-27 PROCEDURE — 87636 SARSCOV2 & INF A&B AMP PRB: CPT | Performed by: EMERGENCY MEDICINE

## 2024-03-27 PROCEDURE — 84075 ASSAY ALKALINE PHOSPHATASE: CPT | Performed by: EMERGENCY MEDICINE

## 2024-03-27 PROCEDURE — 2500000004 HC RX 250 GENERAL PHARMACY W/ HCPCS (ALT 636 FOR OP/ED): Performed by: EMERGENCY MEDICINE

## 2024-03-27 PROCEDURE — 96375 TX/PRO/DX INJ NEW DRUG ADDON: CPT

## 2024-03-27 PROCEDURE — 80053 COMPREHEN METABOLIC PANEL: CPT | Performed by: EMERGENCY MEDICINE

## 2024-03-27 PROCEDURE — 99285 EMERGENCY DEPT VISIT HI MDM: CPT

## 2024-03-27 PROCEDURE — 36415 COLL VENOUS BLD VENIPUNCTURE: CPT | Performed by: EMERGENCY MEDICINE

## 2024-03-27 PROCEDURE — 71045 X-RAY EXAM CHEST 1 VIEW: CPT

## 2024-03-27 PROCEDURE — 71045 X-RAY EXAM CHEST 1 VIEW: CPT | Performed by: RADIOLOGY

## 2024-03-27 RX ORDER — SODIUM CHLORIDE 9 MG/ML
125 INJECTION, SOLUTION INTRAVENOUS CONTINUOUS
Status: DISCONTINUED | OUTPATIENT
Start: 2024-03-27 | End: 2024-03-28

## 2024-03-27 RX ORDER — ONDANSETRON HYDROCHLORIDE 2 MG/ML
4 INJECTION, SOLUTION INTRAVENOUS ONCE
Status: COMPLETED | OUTPATIENT
Start: 2024-03-27 | End: 2024-03-27

## 2024-03-27 RX ADMIN — ONDANSETRON 4 MG: 2 INJECTION INTRAMUSCULAR; INTRAVENOUS at 21:26

## 2024-03-27 RX ADMIN — SODIUM CHLORIDE 125 ML/HR: 9 INJECTION, SOLUTION INTRAVENOUS at 22:18

## 2024-03-27 RX ADMIN — SODIUM CHLORIDE 1000 ML: 9 INJECTION, SOLUTION INTRAVENOUS at 21:25

## 2024-03-27 ASSESSMENT — PAIN SCALES - GENERAL
PAINLEVEL_OUTOF10: 6

## 2024-03-27 ASSESSMENT — COLUMBIA-SUICIDE SEVERITY RATING SCALE - C-SSRS
2. HAVE YOU ACTUALLY HAD ANY THOUGHTS OF KILLING YOURSELF?: NO
1. IN THE PAST MONTH, HAVE YOU WISHED YOU WERE DEAD OR WISHED YOU COULD GO TO SLEEP AND NOT WAKE UP?: NO
6. HAVE YOU EVER DONE ANYTHING, STARTED TO DO ANYTHING, OR PREPARED TO DO ANYTHING TO END YOUR LIFE?: NO

## 2024-03-27 ASSESSMENT — PAIN DESCRIPTION - FREQUENCY: FREQUENCY: INTERMITTENT

## 2024-03-27 ASSESSMENT — PAIN DESCRIPTION - LOCATION: LOCATION: ABDOMEN

## 2024-03-27 ASSESSMENT — PAIN DESCRIPTION - ONSET: ONSET: GRADUAL

## 2024-03-27 ASSESSMENT — PAIN DESCRIPTION - PAIN TYPE: TYPE: ACUTE PAIN

## 2024-03-27 ASSESSMENT — PAIN - FUNCTIONAL ASSESSMENT: PAIN_FUNCTIONAL_ASSESSMENT: 0-10

## 2024-03-27 ASSESSMENT — PAIN DESCRIPTION - DESCRIPTORS: DESCRIPTORS: CRAMPING

## 2024-03-27 ASSESSMENT — PAIN DESCRIPTION - ORIENTATION: ORIENTATION: UPPER

## 2024-03-28 ENCOUNTER — APPOINTMENT (OUTPATIENT)
Dept: RADIOLOGY | Facility: HOSPITAL | Age: 34
End: 2024-03-28
Payer: COMMERCIAL

## 2024-03-28 VITALS
WEIGHT: 215 LBS | TEMPERATURE: 98.1 F | SYSTOLIC BLOOD PRESSURE: 124 MMHG | OXYGEN SATURATION: 98 % | RESPIRATION RATE: 18 BRPM | DIASTOLIC BLOOD PRESSURE: 76 MMHG | HEART RATE: 92 BPM | HEIGHT: 64 IN | BODY MASS INDEX: 36.7 KG/M2

## 2024-03-28 PROBLEM — K52.9 ENTERITIS: Status: ACTIVE | Noted: 2024-03-28

## 2024-03-28 PROBLEM — K52.9 ENTERITIS: Status: RESOLVED | Noted: 2024-03-28 | Resolved: 2024-03-28

## 2024-03-28 LAB
ANION GAP SERPL CALC-SCNC: 12 MMOL/L (ref 10–20)
APPEARANCE UR: ABNORMAL
BILIRUB UR STRIP.AUTO-MCNC: NEGATIVE MG/DL
BUN SERPL-MCNC: 11 MG/DL (ref 6–23)
C DIF TOX TCDA+TCDB STL QL NAA+PROBE: NOT DETECTED
CALCIUM SERPL-MCNC: 8.5 MG/DL (ref 8.6–10.3)
CHLORIDE SERPL-SCNC: 108 MMOL/L (ref 98–107)
CO2 SERPL-SCNC: 22 MMOL/L (ref 21–32)
COLOR UR: YELLOW
CREAT SERPL-MCNC: 0.7 MG/DL (ref 0.5–1.05)
EGFRCR SERPLBLD CKD-EPI 2021: >90 ML/MIN/1.73M*2
ERYTHROCYTE [DISTWIDTH] IN BLOOD BY AUTOMATED COUNT: 16.3 % (ref 11.5–14.5)
GLUCOSE SERPL-MCNC: 95 MG/DL (ref 74–99)
GLUCOSE UR STRIP.AUTO-MCNC: NEGATIVE MG/DL
HCT VFR BLD AUTO: 39 % (ref 36–46)
HGB BLD-MCNC: 13.2 G/DL (ref 12–16)
HOLD SPECIMEN: NORMAL
KETONES UR STRIP.AUTO-MCNC: ABNORMAL MG/DL
LEUKOCYTE ESTERASE UR QL STRIP.AUTO: NEGATIVE
MCH RBC QN AUTO: 28.2 PG (ref 26–34)
MCHC RBC AUTO-ENTMCNC: 33.8 G/DL (ref 32–36)
MCV RBC AUTO: 83 FL (ref 80–100)
NITRITE UR QL STRIP.AUTO: NEGATIVE
NRBC BLD-RTO: 0 /100 WBCS (ref 0–0)
PH UR STRIP.AUTO: 5 [PH]
PLATELET # BLD AUTO: 268 X10*3/UL (ref 150–450)
POTASSIUM SERPL-SCNC: 3.7 MMOL/L (ref 3.5–5.3)
PROT UR STRIP.AUTO-MCNC: NEGATIVE MG/DL
RBC # BLD AUTO: 4.68 X10*6/UL (ref 4–5.2)
RBC # UR STRIP.AUTO: NEGATIVE /UL
SODIUM SERPL-SCNC: 138 MMOL/L (ref 136–145)
SP GR UR STRIP.AUTO: 1.02
TSH SERPL-ACNC: 1.07 MIU/L (ref 0.44–3.98)
UROBILINOGEN UR STRIP.AUTO-MCNC: <2 MG/DL
WBC # BLD AUTO: 13.9 X10*3/UL (ref 4.4–11.3)

## 2024-03-28 PROCEDURE — 2500000004 HC RX 250 GENERAL PHARMACY W/ HCPCS (ALT 636 FOR OP/ED): Performed by: EMERGENCY MEDICINE

## 2024-03-28 PROCEDURE — 36415 COLL VENOUS BLD VENIPUNCTURE: CPT | Performed by: NURSE PRACTITIONER

## 2024-03-28 PROCEDURE — 74177 CT ABD & PELVIS W/CONTRAST: CPT | Performed by: RADIOLOGY

## 2024-03-28 PROCEDURE — 96376 TX/PRO/DX INJ SAME DRUG ADON: CPT

## 2024-03-28 PROCEDURE — 2550000001 HC RX 255 CONTRASTS: Performed by: EMERGENCY MEDICINE

## 2024-03-28 PROCEDURE — 74177 CT ABD & PELVIS W/CONTRAST: CPT

## 2024-03-28 PROCEDURE — 2500000002 HC RX 250 W HCPCS SELF ADMINISTERED DRUGS (ALT 637 FOR MEDICARE OP, ALT 636 FOR OP/ED): Performed by: INTERNAL MEDICINE

## 2024-03-28 PROCEDURE — 80048 BASIC METABOLIC PNL TOTAL CA: CPT | Performed by: NURSE PRACTITIONER

## 2024-03-28 PROCEDURE — G0378 HOSPITAL OBSERVATION PER HR: HCPCS

## 2024-03-28 PROCEDURE — 96365 THER/PROPH/DIAG IV INF INIT: CPT | Mod: 59

## 2024-03-28 PROCEDURE — 99222 1ST HOSP IP/OBS MODERATE 55: CPT | Performed by: INTERNAL MEDICINE

## 2024-03-28 PROCEDURE — 87506 IADNA-DNA/RNA PROBE TQ 6-11: CPT | Mod: SAMLAB | Performed by: INTERNAL MEDICINE

## 2024-03-28 PROCEDURE — 71260 CT THORAX DX C+: CPT | Performed by: RADIOLOGY

## 2024-03-28 PROCEDURE — 2500000004 HC RX 250 GENERAL PHARMACY W/ HCPCS (ALT 636 FOR OP/ED): Performed by: INTERNAL MEDICINE

## 2024-03-28 PROCEDURE — 2500000001 HC RX 250 WO HCPCS SELF ADMINISTERED DRUGS (ALT 637 FOR MEDICARE OP): Performed by: INTERNAL MEDICINE

## 2024-03-28 PROCEDURE — 87493 C DIFF AMPLIFIED PROBE: CPT | Performed by: INTERNAL MEDICINE

## 2024-03-28 PROCEDURE — 96361 HYDRATE IV INFUSION ADD-ON: CPT

## 2024-03-28 PROCEDURE — 85027 COMPLETE CBC AUTOMATED: CPT | Performed by: NURSE PRACTITIONER

## 2024-03-28 RX ORDER — LEVOFLOXACIN 5 MG/ML
500 INJECTION, SOLUTION INTRAVENOUS ONCE
Status: COMPLETED | OUTPATIENT
Start: 2024-03-28 | End: 2024-03-28

## 2024-03-28 RX ORDER — LANOLIN ALCOHOL/MO/W.PET/CERES
100 CREAM (GRAM) TOPICAL DAILY
Status: DISCONTINUED | OUTPATIENT
Start: 2024-03-28 | End: 2024-03-28 | Stop reason: HOSPADM

## 2024-03-28 RX ORDER — ONDANSETRON 4 MG/1
4 TABLET, ORALLY DISINTEGRATING ORAL EVERY 8 HOURS PRN
Status: DISCONTINUED | OUTPATIENT
Start: 2024-03-28 | End: 2024-03-28 | Stop reason: HOSPADM

## 2024-03-28 RX ORDER — QUETIAPINE FUMARATE 100 MG/1
200 TABLET, FILM COATED ORAL NIGHTLY
Status: DISCONTINUED | OUTPATIENT
Start: 2024-03-28 | End: 2024-03-28 | Stop reason: HOSPADM

## 2024-03-28 RX ORDER — METOPROLOL SUCCINATE 50 MG/1
50 TABLET, EXTENDED RELEASE ORAL 2 TIMES DAILY
Status: DISCONTINUED | OUTPATIENT
Start: 2024-03-28 | End: 2024-03-28 | Stop reason: HOSPADM

## 2024-03-28 RX ORDER — ACETAMINOPHEN 325 MG/1
650 TABLET ORAL EVERY 4 HOURS PRN
Status: DISCONTINUED | OUTPATIENT
Start: 2024-03-28 | End: 2024-03-28 | Stop reason: HOSPADM

## 2024-03-28 RX ORDER — ACETAMINOPHEN 650 MG/1
650 SUPPOSITORY RECTAL EVERY 4 HOURS PRN
Status: DISCONTINUED | OUTPATIENT
Start: 2024-03-28 | End: 2024-03-28 | Stop reason: HOSPADM

## 2024-03-28 RX ORDER — ACETAMINOPHEN 325 MG/1
650 TABLET ORAL ONCE
Status: COMPLETED | OUTPATIENT
Start: 2024-03-28 | End: 2024-03-28

## 2024-03-28 RX ORDER — SODIUM CHLORIDE 9 MG/ML
125 INJECTION, SOLUTION INTRAVENOUS CONTINUOUS
Status: DISCONTINUED | OUTPATIENT
Start: 2024-03-28 | End: 2024-03-28

## 2024-03-28 RX ORDER — LISDEXAMFETAMINE DIMESYLATE 40 MG/1
40 CAPSULE ORAL
Status: DISCONTINUED | OUTPATIENT
Start: 2024-03-28 | End: 2024-03-28 | Stop reason: HOSPADM

## 2024-03-28 RX ORDER — LOPERAMIDE HYDROCHLORIDE 2 MG/1
2 CAPSULE ORAL 4 TIMES DAILY PRN
Status: DISCONTINUED | OUTPATIENT
Start: 2024-03-28 | End: 2024-03-28 | Stop reason: HOSPADM

## 2024-03-28 RX ORDER — PAROXETINE HYDROCHLORIDE 20 MG/1
20 TABLET, FILM COATED ORAL EVERY MORNING
Status: DISCONTINUED | OUTPATIENT
Start: 2024-03-28 | End: 2024-03-28 | Stop reason: HOSPADM

## 2024-03-28 RX ORDER — SPIRONOLACTONE 25 MG/1
25 TABLET ORAL DAILY
Status: DISCONTINUED | OUTPATIENT
Start: 2024-03-28 | End: 2024-03-28 | Stop reason: HOSPADM

## 2024-03-28 RX ORDER — ONDANSETRON HYDROCHLORIDE 2 MG/ML
4 INJECTION, SOLUTION INTRAVENOUS EVERY 8 HOURS PRN
Status: DISCONTINUED | OUTPATIENT
Start: 2024-03-28 | End: 2024-03-28 | Stop reason: HOSPADM

## 2024-03-28 RX ORDER — ONDANSETRON HYDROCHLORIDE 2 MG/ML
4 INJECTION, SOLUTION INTRAVENOUS ONCE
Status: COMPLETED | OUTPATIENT
Start: 2024-03-28 | End: 2024-03-28

## 2024-03-28 RX ORDER — ACETAMINOPHEN 160 MG/5ML
650 SOLUTION ORAL EVERY 4 HOURS PRN
Status: DISCONTINUED | OUTPATIENT
Start: 2024-03-28 | End: 2024-03-28 | Stop reason: HOSPADM

## 2024-03-28 RX ORDER — DICYCLOMINE HYDROCHLORIDE 10 MG/1
20 CAPSULE ORAL 4 TIMES DAILY PRN
Status: DISCONTINUED | OUTPATIENT
Start: 2024-03-28 | End: 2024-03-28 | Stop reason: HOSPADM

## 2024-03-28 RX ADMIN — LEVOFLOXACIN 500 MG: 500 INJECTION, SOLUTION INTRAVENOUS at 02:15

## 2024-03-28 RX ADMIN — ACETAMINOPHEN 650 MG: 325 TABLET ORAL at 08:15

## 2024-03-28 RX ADMIN — SODIUM CHLORIDE 125 ML/HR: 9 INJECTION, SOLUTION INTRAVENOUS at 03:56

## 2024-03-28 RX ADMIN — ONDANSETRON 4 MG: 2 INJECTION INTRAMUSCULAR; INTRAVENOUS at 00:53

## 2024-03-28 RX ADMIN — SPIRONOLACTONE 25 MG: 25 TABLET ORAL at 08:08

## 2024-03-28 RX ADMIN — METOPROLOL SUCCINATE 50 MG: 50 TABLET, EXTENDED RELEASE ORAL at 08:08

## 2024-03-28 RX ADMIN — PAROXETINE 20 MG: 20 TABLET, FILM COATED ORAL at 08:08

## 2024-03-28 RX ADMIN — ACETAMINOPHEN 650 MG: 325 TABLET ORAL at 00:51

## 2024-03-28 RX ADMIN — IOHEXOL 66 ML: 350 INJECTION, SOLUTION INTRAVENOUS at 00:53

## 2024-03-28 SDOH — SOCIAL STABILITY: SOCIAL INSECURITY: HAS ANYONE EVER THREATENED TO HURT YOUR FAMILY OR YOUR PETS?: NO

## 2024-03-28 SDOH — SOCIAL STABILITY: SOCIAL INSECURITY: ARE YOU OR HAVE YOU BEEN THREATENED OR ABUSED PHYSICALLY, EMOTIONALLY, OR SEXUALLY BY ANYONE?: NO

## 2024-03-28 SDOH — SOCIAL STABILITY: SOCIAL INSECURITY: ABUSE: ADULT

## 2024-03-28 SDOH — SOCIAL STABILITY: SOCIAL INSECURITY: DO YOU FEEL UNSAFE GOING BACK TO THE PLACE WHERE YOU ARE LIVING?: NO

## 2024-03-28 SDOH — SOCIAL STABILITY: SOCIAL INSECURITY: WERE YOU ABLE TO COMPLETE ALL THE BEHAVIORAL HEALTH SCREENINGS?: YES

## 2024-03-28 SDOH — SOCIAL STABILITY: SOCIAL INSECURITY: DOES ANYONE TRY TO KEEP YOU FROM HAVING/CONTACTING OTHER FRIENDS OR DOING THINGS OUTSIDE YOUR HOME?: NO

## 2024-03-28 SDOH — SOCIAL STABILITY: SOCIAL INSECURITY: HAVE YOU HAD THOUGHTS OF HARMING ANYONE ELSE?: NO

## 2024-03-28 SDOH — SOCIAL STABILITY: SOCIAL INSECURITY: DO YOU FEEL ANYONE HAS EXPLOITED OR TAKEN ADVANTAGE OF YOU FINANCIALLY OR OF YOUR PERSONAL PROPERTY?: NO

## 2024-03-28 SDOH — SOCIAL STABILITY: SOCIAL INSECURITY: ARE THERE ANY APPARENT SIGNS OF INJURIES/BEHAVIORS THAT COULD BE RELATED TO ABUSE/NEGLECT?: NO

## 2024-03-28 ASSESSMENT — COGNITIVE AND FUNCTIONAL STATUS - GENERAL
DAILY ACTIVITIY SCORE: 24
PATIENT BASELINE BEDBOUND: NO
DAILY ACTIVITIY SCORE: 24
MOBILITY SCORE: 24
MOBILITY SCORE: 24

## 2024-03-28 ASSESSMENT — ACTIVITIES OF DAILY LIVING (ADL)
FEEDING YOURSELF: INDEPENDENT
HEARING - RIGHT EAR: FUNCTIONAL
BATHING: INDEPENDENT
DRESSING YOURSELF: INDEPENDENT
WALKS IN HOME: INDEPENDENT
HEARING - LEFT EAR: FUNCTIONAL
TOILETING: INDEPENDENT
GROOMING: INDEPENDENT
PATIENT'S MEMORY ADEQUATE TO SAFELY COMPLETE DAILY ACTIVITIES?: YES
ADEQUATE_TO_COMPLETE_ADL: YES
JUDGMENT_ADEQUATE_SAFELY_COMPLETE_DAILY_ACTIVITIES: YES

## 2024-03-28 ASSESSMENT — LIFESTYLE VARIABLES
HOW OFTEN DO YOU HAVE 6 OR MORE DRINKS ON ONE OCCASION: NEVER
SKIP TO QUESTIONS 9-10: 1
HOW MANY STANDARD DRINKS CONTAINING ALCOHOL DO YOU HAVE ON A TYPICAL DAY: PATIENT DOES NOT DRINK
PRESCIPTION_ABUSE_PAST_12_MONTHS: NO
HOW OFTEN DO YOU HAVE A DRINK CONTAINING ALCOHOL: NEVER
AUDIT-C TOTAL SCORE: 0
AUDIT-C TOTAL SCORE: 0
SUBSTANCE_ABUSE_PAST_12_MONTHS: YES

## 2024-03-28 ASSESSMENT — PAIN DESCRIPTION - ORIENTATION: ORIENTATION: RIGHT;LEFT

## 2024-03-28 ASSESSMENT — PATIENT HEALTH QUESTIONNAIRE - PHQ9
2. FEELING DOWN, DEPRESSED OR HOPELESS: NOT AT ALL
SUM OF ALL RESPONSES TO PHQ9 QUESTIONS 1 & 2: 0
1. LITTLE INTEREST OR PLEASURE IN DOING THINGS: NOT AT ALL

## 2024-03-28 ASSESSMENT — PAIN DESCRIPTION - LOCATION
LOCATION: HEAD
LOCATION: HEAD

## 2024-03-28 ASSESSMENT — PAIN SCALES - GENERAL
PAINLEVEL_OUTOF10: 6
PAINLEVEL_OUTOF10: 3
PAINLEVEL_OUTOF10: 5 - MODERATE PAIN

## 2024-03-28 ASSESSMENT — PAIN - FUNCTIONAL ASSESSMENT
PAIN_FUNCTIONAL_ASSESSMENT: 0-10
PAIN_FUNCTIONAL_ASSESSMENT: 0-10

## 2024-03-28 NOTE — DISCHARGE SUMMARY
Discharge Diagnosis  Enteritis    Issues Requiring Follow-Up  It is recommended that she follow up with primary care physician in 1 week for hospital follow up    Discharge Meds     Your medication list        CONTINUE taking these medications        Instructions Last Dose Given Next Dose Due   albuterol 90 mcg/actuation inhaler  Commonly known as: Ventolin HFA      Inhale 2 puffs every 4 hours if needed for wheezing or shortness of breath.       cholecalciferol 5,000 Units tablet  Commonly known as: Vitamin D-3           hydrOXYzine pamoate 25 mg capsule  Commonly known as: Vistaril           metoprolol succinate XL 50 mg 24 hr tablet  Commonly known as: Toprol-XL      take 1 tablet by mouth once daily DO NOT CRUSH OR CHEW       ondansetron ODT 4 mg disintegrating tablet  Commonly known as: Zofran-ODT      Take 1 tablet (4 mg) by mouth every 8 hours if needed for nausea or vomiting.       PARoxetine 20 mg tablet  Commonly known as: Paxil           pyridoxine 100 mg tablet  Commonly known as: Vitamin B-6           QUEtiapine 400 mg tablet  Commonly known as: SEROquel           spironolactone 25 mg tablet  Commonly known as: Aldactone      take 1 tablet by mouth once daily       Vyvanse 40 mg capsule  Generic drug: lisdexamfetamine                    Test Results Pending At Discharge  Pending Labs       Order Current Status    Extra Urine Gray Tube Collected (03/28/24 0324)    Urinalysis with Reflex Culture and Microscopic Collected (03/28/24 0324)    Stool Pathogen Panel, PCR In process            Mary Jo Corley is a 33 y.o. female  Who presented to the emergency room for nausea vomiting abdominal cramps fatigue and diarrhea.  On presentation, vital signs grossly within normal limits.  Pertinent findings on blood workup reveals WBC count 24,600 and the rest is also grossly within normal limits.  CT scan of the abdomen pelvis showed fluid-filled colon with questionable underlying enteritis.  Patient was  given in the emergency room IV Levaquin and Zofran and IV fluids and then admitted to the medical service for further investigation and management.     Patient said that yesterday she woke up in the morning with severe nausea.  This was followed by vomiting.  She has had multiple recurrent episodes.  And then was having diarrhea throughout the day.  Describes it as watery nonbloody.  She also had subjective fever and chills.  This has also been associated with intermittent colicky abdominal pain.  Patient stated that 2 weeks ago she had a uterine biopsy procedure done     Pt was given IVF and iv zofran and nausea stopped, pt also tested neg for c-diff and given imodium and pt able to keep down oral liq diet and this was changed to regular diet for lunch and she did well keeping this down. Pt able to discharge home and follow up with primary care physician in 1 week for hospital follow up.        Pertinent Physical Exam At Time of Discharge  Physical Exam  Constitutional:       General: She is not in acute distress.       Comments: Awake alert and oriented x3   HENT:      Mouth/Throat:      Pharynx: Oropharynx is clear.   Eyes:      Pupils: Pupils are equal, round, and reactive to light.   Cardiovascular:      Rate and Rhythm: Normal rate and regular rhythm.      Heart sounds: Normal heart sounds.   Pulmonary:      Effort: No respiratory distress.      Breath sounds: Normal breath sounds. No wheezing or rhonchi.   Abdominal:      General: Abdomen is flat. Bowel sounds are normal. There is no distension.      Palpations: Abdomen is soft.      Tenderness: There is no abdominal tenderness.   Musculoskeletal:         General: No swelling.   Skin:     General: Skin is warm.   Neurological:      General: No focal deficit present.   Psychiatric:         Mood and Affect: Mood normal.         Behavior: Behavior normal.         Thought Content: Thought content normal.         Judgment: Judgment normal.     Outpatient  Follow-Up  Future Appointments   Date Time Provider Department Center   3/29/2024  8:45 AM Andrea Butler MD [id number] [facility]   4/4/2024  1:20 PM Kerrie Fall MD [id number] [facility]   6/25/2024  9:30 AM SANTOS Moise-CNP [id number] [facility]         Odalis Edmonds APRN-CNP

## 2024-03-28 NOTE — ED PROVIDER NOTES
HPI   Chief Complaint   Patient presents with    general illness     Pt comes in for general illness. Pt states that she had a surgical  procedure recently. Pt states that she has been having issues with temp regulation. Pt also endorses diarrhea, nausea, vomiting, abdominal cramping, fatigue and headache.        33-year-old female presents with multiple complaints.  Patient states she had a uterine biopsy and ablation 2 weeks ago.  Patient states she woke up this morning with some nausea and vomiting.  Patient has developed increasing nausea and vomiting with diarrhea throughout the day.  Patient did have a change in her medication yesterday and called her attending to see if this could have caused the presenting complaint.  Patient also states she has had some questionable fever and chills.  Denies any vaginal bleeding or discharge with presenting complaint.  Patient will have an IV established and hydrated.  The patient will be continually hydrated and given a dose of Levaquin 500 mg IV.  Patient will be placed in observation status for further diagnostic studies.      History provided by:  Patient                      [place] Coma Scale Score: 15                     Patient History   Past Medical History:   Diagnosis Date    ADHD (attention deficit hyperactivity disorder)     Bipolar 1 disorder (CMS/[facility])     Calculus of kidney 2021    Kidney stone on left side    Encounter for  delivery without indication     Delivery of pregnancy by  section    Encounter for gynecological examination (general) (routine) without abnormal findings     Pap test, as part of routine gynecological examination    Low iron     Other conditions influencing health status     Menstruation    Pain in unspecified hip 2021    Hip pain, acute    Personal history of other complications of pregnancy, childbirth and the puerperium     History of miscarriage    Personal history of other diseases of urinary system  12/10/2020    History of hydronephrosis    Personal history of other specified conditions 12/10/2020    History of flank pain    Personal history of other specified conditions 2021    History of fatigue     Past Surgical History:   Procedure Laterality Date     SECTION, LOW TRANSVERSE      CYSTOSCOPY INSERTION / REMOVAL STENT / STONE      EXPLORATORY LAPAROTOMY      OTHER SURGICAL HISTORY  2021    Cyst excision    TUBAL LIGATION Bilateral 2014     Family History   Problem Relation Name Age of Onset    Depression Mother      Hypertension Mother      Hyperlipidemia Mother      Stroke Mother      Heart attack Mother      Anxiety disorder Mother      Fibromyalgia Mother      Parkinsonism Father      Heart disease Father      Coronary artery disease Father       Social History     Tobacco Use    Smoking status: Some Days     Packs/day: .5     Types: Cigarettes    Smokeless tobacco: Never   Vaping Use    Vaping Use: Never used   Substance Use Topics    Alcohol use: Not Currently    Drug use: Yes     Frequency: 2.0 times per week     Types: Marijuana       Physical Exam   ED Triage Vitals [24]   Temperature Heart Rate Respirations BP   36.7 °C (98 °F) 87 18 113/79      Pulse Ox Temp Source Heart Rate Source Patient Position   95 % Temporal Monitor Sitting      BP Location FiO2 (%)     Right arm --       Physical Exam  Vitals and nursing note reviewed.   Constitutional:       General: She is not in acute distress.     Appearance: She is well-developed.   HENT:      Head: Normocephalic and atraumatic.   Eyes:      Conjunctiva/sclera: Conjunctivae normal.   Cardiovascular:      Rate and Rhythm: Normal rate and regular rhythm.      Heart sounds: No murmur heard.  Pulmonary:      Effort: Pulmonary effort is normal. No respiratory distress.      Breath sounds: Normal breath sounds.   Abdominal:      Palpations: Abdomen is soft.      Tenderness: There is no abdominal tenderness.   Musculoskeletal:          General: No swelling.      Cervical back: Neck supple.   Skin:     General: Skin is warm and dry.      Capillary Refill: Capillary refill takes less than 2 seconds.   Neurological:      Mental Status: She is alert.   Psychiatric:         Mood and Affect: Mood normal.          CT chest abdomen pelvis w IV contrast   Final Result        Slight hepatic steatosis. No evidence of infiltrate in the chest or   acute intrathoracic pathology.        Unchanged punctate non-obstructing nephroliths        Fluid-filled colon. Query any evidence of recent diarrhea or   enteritis without small bowel obstruction.        Signed by: Von Armenta 3/28/2024 1:59 AM   Dictation workstation:   [id number]      XR chest 1 view   Final Result   1. No acute cardiopulmonary process.        Signed by: Rolando Ford 3/27/2024 11:43 PM   Dictation workstation:   [id number]         Labs Reviewed   URINALYSIS WITH REFLEX MICROSCOPIC - Abnormal       Result Value    Color, Urine Yellow      Appearance, Urine Hazy (*)     Specific Gravity, Urine 1.019      pH, Urine 5.0      Protein, Urine NEGATIVE      Glucose, Urine NEGATIVE      Blood, Urine NEGATIVE      Ketones, Urine 20 (1+) (*)     Bilirubin, Urine NEGATIVE      Urobilinogen, Urine <2.0      Nitrite, Urine NEGATIVE      Leukocyte Esterase, Urine NEGATIVE     CBC WITH AUTO DIFFERENTIAL - Abnormal    WBC 24.6 (*)     nRBC 0.0      RBC 5.59 (*)     Hemoglobin 15.5      Hematocrit 46.7 (*)     MCV 84      MCH 27.7      MCHC 33.2      RDW 16.0 (*)     Platelets 356      Neutrophils % 91.1      Immature Granulocytes %, Automated 0.4      Lymphocytes % 4.1      Monocytes % 2.8      Eosinophils % 1.4      Basophils % 0.2      Neutrophils Absolute 22.38 (*)     Immature Granulocytes Absolute, Automated 0.11      Lymphocytes Absolute 1.01 (*)     Monocytes Absolute 0.68      Eosinophils Absolute 0.34      Basophils Absolute 0.05     SARS-COV-2 AND INFLUENZA A/B PCR - Normal    Flu A  Result Not Detected      Flu B Result Not Detected      Coronavirus 2019, PCR Not Detected      Narrative:     This assay has received FDA Emergency Use Authorization (EUA) and  is only authorized for the duration of time that circumstances exist to justify the authorization of the emergency use of in vitro diagnostic tests for the detection of SARS-CoV-2 virus and/or diagnosis of COVID-19 infection under section 564(b)(1) of the Act, 21 U.S.C. 360bbb-3(b)(1). Testing for SARS-CoV-2 is only recommended for patients who meet current clinical and/or epidemiological criteria as defined by federal, state, or local public health directives. This assay is an in vitro diagnostic nucleic acid amplification test for the qualitative detection of SARS-CoV-2, Influenza A, and Influenza B from nasopharyngeal specimens and has been validated for use at [facility]. Negative results do not preclude COVID-19 infections or Influenza A/B infections, and should not be used as the sole basis for diagnosis, treatment, or other management decisions. If Influenza A/B and RSV PCR results are negative, testing for Parainfluenza virus, Adenovirus and Metapneumovirus is routinely performed for [facility] pediatric oncology and intensive care inpatients, and is available on other patients by placing an add-on request.    COMPREHENSIVE METABOLIC PANEL - Normal    Glucose 89      Sodium 138      Potassium 4.2      Chloride 104      Bicarbonate 24      Anion Gap 14      Urea Nitrogen 14      Creatinine 0.84      eGFR >90      Calcium 9.6      Albumin 4.5      Alkaline Phosphatase 60      Total Protein 7.2      AST 16      Bilirubin, Total 0.6      ALT 30     TSH - Normal    Thyroid Stimulating Hormone 1.07      Narrative:     TSH testing is performed using different testing methodology at [facility] than at other [facility]. Direct result comparisons should only be made within the same method.     STOOL  PATHOGEN PANEL, PCR   C. DIFFICILE, PCR   GREEN TOP    Extra Tube Hold for add-ons.     GRAY TOP    Extra Tube Hold for add-ons.     URINALYSIS WITH REFLEX CULTURE AND MICROSCOPIC    Narrative:     The following orders were created for panel order Urinalysis with Reflex Culture and Microscopic.  Procedure                               Abnormality         Status                     ---------                               -----------         ------                     Urinalysis with Reflex C...[[id number]]                                                 Extra Urine Gray Tube[[id number]]                                                         Please view results for these tests on the individual orders.   URINALYSIS WITH REFLEX CULTURE AND MICROSCOPIC   EXTRA URINE GRAY TUBE      CT chest abdomen pelvis w IV contrast   Final Result        Slight hepatic steatosis. No evidence of infiltrate in the chest or   acute intrathoracic pathology.        Unchanged punctate non-obstructing nephroliths        Fluid-filled colon. Query any evidence of recent diarrhea or   enteritis without small bowel obstruction.        Signed by: Von Armenta 3/28/2024 1:59 AM   Dictation workstation:   [id number]      XR chest 1 view   Final Result   1. No acute cardiopulmonary process.        Signed by: Rolando Ford 3/27/2024 11:43 PM   Dictation workstation:   [id number]         ED Course & MDM   Diagnoses as of 03/28/24 0208   Enteritis       Medical Decision Making  observation    Procedure  Procedures     Vladimir Degroot,   03/28/24 0208

## 2024-03-28 NOTE — PROGRESS NOTES
03/28/24 1434   Discharge Planning   Living Arrangements Friends;Other (Comment)  (children)   Support Systems Friends/neighbors;Family members   Assistance Needed None   Type of Residence Private residence   Do you have animals or pets at home? Yes   Type of Animals or Pets 2 dogs, 4 cats   Who is requesting discharge planning? Provider   Home or Post Acute Services None   Patient expects to be discharged to: Home   Does the patient need discharge transport arranged? Yes   RoundTrip coordination needed? No   Has discharge transport been arranged? No     Care Transitions: Patient reviewed in care round meeting this AM and may be medically ready for discharge today. Met with patient at bedside. Role of TCC explained. Demographics and contacts verified. States she feels safe at home and has a close relationship with her grandma Cherise. PCP is Dr. Fall. Her pharmacy of choice is [facility] for medication needs. Denies any difficulty obtaining/affording medications. She is independent with all ADL's at home and drives. States she has a friend that will be picking her up from the hospital at discharge. States her plan at discharge is to return home, denies any needs ir in home services. No further needs anticipated from care transitions. Care team available upon request. Clary Graham RN/TCC

## 2024-03-28 NOTE — NURSING NOTE
Discharge Note: 3/28/2024 1401 Discharge instructions and pt responsibilities reviewed with pt and copy given. Gastroenteritis education reviewed with pt and information sheets given. Pt verbalizes understanding of instructions received, verbalizes understanding of when to seek medical attention, denies any home going or personal care needs. Denies further questions or concerns. Reviewed follow up appts with pt and verbalizes understanding. Declines wheelchair, ambulates to front lobby accompanied by RN, personal belongings taken with pt, no distress noted, no complaints voiced. Luisito HARRISON

## 2024-03-28 NOTE — H&P
History Of Present Illness  Radha Corley is a 33 y.o. female  Who presented to the emergency room for nausea vomiting abdominal cramps fatigue and diarrhea.  On presentation, vital signs grossly within normal limits.  Pertinent findings on blood workup reveals WBC count 24,600 and the rest is also grossly within normal limits.  CT scan of the abdomen pelvis showed fluid-filled colon with questionable underlying enteritis.  Patient was given in the emergency room IV Levaquin and Zofran and IV fluids and then admitted to the medical service for further investigation and management.    Patient said that yesterday she woke up in the morning with severe nausea.  This was followed by vomiting.  She has had multiple recurrent episodes.  And then was having diarrhea throughout the day.  Describes it as watery nonbloody.  She also had subjective fever and chills.  This has also been associated with intermittent colicky abdominal pain.  Patient stated that 2 weeks ago she had a uterine biopsy procedure done       ROS  10 systems were reviewed and were negative except for those noted in the history of present illness.    Past Medical History  Past Medical History:   Diagnosis Date    ADHD (attention deficit hyperactivity disorder)     Bipolar 1 disorder (CMS/[facility])     Calculus of kidney 2021    Kidney stone on left side    Encounter for  delivery without indication     Delivery of pregnancy by  section    Encounter for gynecological examination (general) (routine) without abnormal findings     Pap test, as part of routine gynecological examination    Low iron     Other conditions influencing health status     Menstruation    Pain in unspecified hip 2021    Hip pain, acute    Personal history of other complications of pregnancy, childbirth and the puerperium     History of miscarriage    Personal history of other diseases of urinary system 12/10/2020    History of hydronephrosis    Personal  history of other specified conditions 12/10/2020    History of flank pain    Personal history of other specified conditions 2021    History of fatigue     Pertinent medical history also documented in my below narrative    Surgical History  Past Surgical History:   Procedure Laterality Date     SECTION, LOW TRANSVERSE      CYSTOSCOPY INSERTION / REMOVAL STENT / STONE      EXPLORATORY LAPAROTOMY      OTHER SURGICAL HISTORY  2021    Cyst excision    TUBAL LIGATION Bilateral 2014      Pertinent surgical history also documented in my below narrative    Social History  She reports that she has been smoking cigarettes. She has been smoking an average of .5 packs per day. She has never used smokeless tobacco. She reports that she does not currently use alcohol. She reports current drug use. Frequency: 2.00 times per week. Drug: Marijuana.    Family History  Family History   Problem Relation Name Age of Onset    Depression Mother      Hypertension Mother      Hyperlipidemia Mother      Stroke Mother      Heart attack Mother      Anxiety disorder Mother      Fibromyalgia Mother      Parkinsonism Father      Heart disease Father      Coronary artery disease Father          Allergies  Keflex [cephalexin] and Augmentin [amoxicillin-pot clavulanate]    Medications Prior to Admission   Medication Sig Dispense Refill Last Dose    albuterol (Ventolin HFA) 90 mcg/actuation inhaler Inhale 2 puffs every 4 hours if needed for wheezing or shortness of breath. 8 g 5 3/27/2024    cholecalciferol (Vitamin D-3) 5,000 Units tablet Take 1 tablet (5,000 Units) by mouth once daily.   Unknown    hydrOXYzine pamoate (Vistaril) 25 mg capsule Take 1 capsule (25 mg) by mouth 2 times a day.   3/27/2024    metoprolol succinate XL (Toprol-XL) 50 mg 24 hr tablet take 1 tablet by mouth once daily DO NOT CRUSH OR CHEW (Patient taking differently: Take 1 tablet (50 mg) by mouth 2 times a day. DO NOT CRUSH OR CHEW) 90 tablet 1 3/27/2024  "   ondansetron ODT (Zofran-ODT) 4 mg disintegrating tablet Take 1 tablet (4 mg) by mouth every 8 hours if needed for nausea or vomiting. 15 tablet 0 Unknown    PARoxetine (Paxil) 20 mg tablet Take 1 tablet (20 mg) by mouth once daily in the morning.   3/27/2024    pyridoxine (Vitamin B-6) 100 mg tablet Take 1 tablet (100 mg) by mouth once daily.   Unknown    QUEtiapine (SEROquel) 400 mg tablet Take 0.5 tablets (200 mg) by mouth once daily at bedtime.   3/28/2024    spironolactone (Aldactone) 25 mg tablet take 1 tablet by mouth once daily 30 tablet 5 3/27/2024    Vyvanse 40 mg capsule Take 1 capsule (40 mg) by mouth once daily in the morning. Take before meals.   3/27/2024        Last Recorded Vitals  Blood pressure 123/88, pulse 93, temperature 36.2 °C (97.1 °F), temperature source Temporal, resp. rate 18, height 1.626 m (5' 4\"), weight 97.5 kg (215 lb), last menstrual period 02/15/2024, SpO2 95 %.    Physical Exam  Constitutional:       General: She is not in acute distress.     Appearance: She is ill-appearing.      Comments: Awake alert and oriented x3   HENT:      Mouth/Throat:      Pharynx: Oropharynx is clear.   Eyes:      Pupils: Pupils are equal, round, and reactive to light.   Cardiovascular:      Rate and Rhythm: Normal rate and regular rhythm.      Heart sounds: Normal heart sounds.   Pulmonary:      Effort: No respiratory distress.      Breath sounds: Normal breath sounds. No wheezing or rhonchi.   Abdominal:      General: Abdomen is flat. Bowel sounds are normal. There is no distension.      Palpations: Abdomen is soft.      Tenderness: There is no abdominal tenderness.   Musculoskeletal:         General: No swelling.   Skin:     General: Skin is warm.   Neurological:      General: No focal deficit present.   Psychiatric:         Mood and Affect: Mood normal.         Behavior: Behavior normal.         Thought Content: Thought content normal.         Judgment: Judgment normal.           Relevant " Results  Results for orders placed or performed during the hospital encounter of 03/27/24 (from the past 24 hour(s))   Sars-CoV-2 and Influenza A/B PCR   Result Value Ref Range    Flu A Result Not Detected Not Detected    Flu B Result Not Detected Not Detected    Coronavirus 2019, PCR Not Detected Not Detected   Light Blue Top   Result Value Ref Range    Extra Tube Hold for add-ons.    Green Top   Result Value Ref Range    Extra Tube Hold for add-ons.    SST TOP   Result Value Ref Range    Extra Tube Hold for add-ons.    Gray Top   Result Value Ref Range    Extra Tube Hold for add-ons.    CBC and Auto Differential   Result Value Ref Range    WBC 24.6 (H) 4.4 - 11.3 x10*3/uL    nRBC 0.0 0.0 - 0.0 /100 WBCs    RBC 5.59 (H) 4.00 - 5.20 x10*6/uL    Hemoglobin 15.5 12.0 - 16.0 g/dL    Hematocrit 46.7 (H) 36.0 - 46.0 %    MCV 84 80 - 100 fL    MCH 27.7 26.0 - 34.0 pg    MCHC 33.2 32.0 - 36.0 g/dL    RDW 16.0 (H) 11.5 - 14.5 %    Platelets 356 150 - 450 x10*3/uL    Neutrophils % 91.1 40.0 - 80.0 %    Immature Granulocytes %, Automated 0.4 0.0 - 0.9 %    Lymphocytes % 4.1 13.0 - 44.0 %    Monocytes % 2.8 2.0 - 10.0 %    Eosinophils % 1.4 0.0 - 6.0 %    Basophils % 0.2 0.0 - 2.0 %    Neutrophils Absolute 22.38 (H) 1.20 - 7.70 x10*3/uL    Immature Granulocytes Absolute, Automated 0.11 0.00 - 0.70 x10*3/uL    Lymphocytes Absolute 1.01 (L) 1.20 - 4.80 x10*3/uL    Monocytes Absolute 0.68 0.10 - 1.00 x10*3/uL    Eosinophils Absolute 0.34 0.00 - 0.70 x10*3/uL    Basophils Absolute 0.05 0.00 - 0.10 x10*3/uL   Comprehensive metabolic panel   Result Value Ref Range    Glucose 89 74 - 99 mg/dL    Sodium 138 136 - 145 mmol/L    Potassium 4.2 3.5 - 5.3 mmol/L    Chloride 104 98 - 107 mmol/L    Bicarbonate 24 21 - 32 mmol/L    Anion Gap 14 10 - 20 mmol/L    Urea Nitrogen 14 6 - 23 mg/dL    Creatinine 0.84 0.50 - 1.05 mg/dL    eGFR >90 >60 mL/min/1.73m*2    Calcium 9.6 8.6 - 10.3 mg/dL    Albumin 4.5 3.4 - 5.0 g/dL    Alkaline Phosphatase  60 33 - 110 U/L    Total Protein 7.2 6.4 - 8.2 g/dL    AST 16 9 - 39 U/L    Bilirubin, Total 0.6 0.0 - 1.2 mg/dL    ALT 30 7 - 45 U/L   Thyroid Stimulating Hormone   Result Value Ref Range    Thyroid Stimulating Hormone 1.07 0.44 - 3.98 mIU/L   Urinalysis with Reflex Microscopic   Result Value Ref Range    Color, Urine Yellow Straw, Yellow    Appearance, Urine Hazy (N) Clear    Specific Gravity, Urine 1.019 1.005 - 1.035    pH, Urine 5.0 5.0, 5.5, 6.0, 6.5, 7.0, 7.5, 8.0    Protein, Urine NEGATIVE NEGATIVE mg/dL    Glucose, Urine NEGATIVE NEGATIVE mg/dL    Blood, Urine NEGATIVE NEGATIVE    Ketones, Urine 20 (1+) (A) NEGATIVE mg/dL    Bilirubin, Urine NEGATIVE NEGATIVE    Urobilinogen, Urine <2.0 <2.0 mg/dL    Nitrite, Urine NEGATIVE NEGATIVE    Leukocyte Esterase, Urine NEGATIVE NEGATIVE        CT chest abdomen pelvis w IV contrast    Result Date: 3/28/2024  Interpreted By:  Von Armenta, STUDY: CT CHEST ABDOMEN PELVIS W IV CONTRAST;  3/28/2024 12:51 am   INDICATION: Signs/Symptoms:weakness.   COMPARISON: 02/2024   ACCESSION NUMBER(S): [id number]   ORDERING CLINICIAN: AIDEN FLORES   TECHNIQUE: Contiguous axial images of the chest, abdomen, and pelvis were obtained after the intravenous administration of of intravenous contrast.  Coronal and sagittal reformatted images were reconstructed from the axial data.     FINDINGS: CT CHEST:   MEDIASTINUM AND LYMPH NODES:  No enlarged intrathoracic or axillary lymph nodes. No pneumomediastinum.   VESSELS:  Normal caliber aorta without dissection. No significant aortic atherosclerosis.   HEART: Normal size.  No coronary artery calcifications. No significant pericardial effusion.   LUNG, AIRWAYS, PLEURA:  No consolidation, pulmonary edema, pleural effusion or pneumothorax.   CHEST WALL SOFT TISSUES: No discernible abnormality.   OSSEOUS STRUCTURES: No acute osseous abnormality.     CT ABDOMEN/PELVIS:   ABDOMINAL WALL: No acute abnormality.   LIVER: No significant parenchymal  abnormality.   BILE DUCTS: No significant intrahepatic or extrahepatic dilatation.   GALLBLADDER: No significant abnormality.   SPLEEN: No significant abnormality.   PANCREAS: No significant abnormality.   ADRENALS: No significant abnormality.   KIDNEYS, URETERS, BLADDER: Punctate nephroliths as seen previously. No hydronephrosis 1 cm left renal cyst   REPRODUCTIVE ORGANS: No significant abnormality.   VESSELS: No significant abnormality.   LYMPH NODES/RETROPERITONEUM: No enlarged lymph nodes.   BOWEL/MESENTERY/PERITONEUM:  Fluid-filled colon. No diverticulitis. No bowel obstruction   MUSCULOSKELETAL: No acute osseous abnormality.         Slight hepatic steatosis. No evidence of infiltrate in the chest or acute intrathoracic pathology.   Unchanged punctate non-obstructing nephroliths   Fluid-filled colon. Query any evidence of recent diarrhea or enteritis without small bowel obstruction.   Signed by: Von Armenta 3/28/2024 1:59 AM Dictation workstation:   [id number]    XR chest 1 view    Result Date: 3/27/2024  Interpreted By:  Rolando Ford, STUDY: XR CHEST 1 VIEW;  3/27/2024 11:11 pm   INDICATION: Signs/Symptoms:pneumonia.   COMPARISON: 11/10/2016   ACCESSION NUMBER(S): [id number]   ORDERING CLINICIAN: AIDEN FLORES   FINDINGS:     No consolidation, pleural effusion, or pneumothorax. Heart size is normal. No acute osseous abnormality. Upper abdomen and superficial soft tissues are unremarkable.       1. No acute cardiopulmonary process.   Signed by: Rolando Ford 3/27/2024 11:43 PM Dictation workstation:   [id number]        Assessment/Plan       33-year-old obese female with a past medical history of hypertension, borderline personality disorder, ADHD, reported autism, bipolar disease, iron deficiency, GERD, and vitamin D deficiency, who presented to the emergency room for nausea, vomiting, diarrhea and abdominal discomfort.  Patient's clinical presentation consistent with an acute enteritis.   Blood workup showed leukocytosis.    Admit patient to inpatient medical service with vital signs monitoring.  I will order stool C. difficile and stool pathogen.  Would hold on giving any further systemic antibiotics.  Give IV fluids normal saline at rate of 125 cc/hour.  Clear liquid diet.  Antiemetics as needed.  Resume home medications  SCDs for DVT prophylaxis  Full code      (This note was generated with voice recognition software and may contain errors including spelling, grammar, syntax and misrecognition of what was dictated, that are not fully corrected)     Viktor Barrientos MD

## 2024-03-29 ENCOUNTER — PATIENT OUTREACH (OUTPATIENT)
Dept: CARE COORDINATION | Facility: CLINIC | Age: 34
End: 2024-03-29

## 2024-03-29 DIAGNOSIS — K52.9 ENTERITIS: ICD-10-CM

## 2024-03-29 LAB
C COLI+JEJ+UPSA DNA STL QL NAA+PROBE: NOT DETECTED
EC STX1 GENE STL QL NAA+PROBE: NOT DETECTED
EC STX2 GENE STL QL NAA+PROBE: NOT DETECTED
NOROVIRUS GI + GII RNA STL NAA+PROBE: DETECTED
RV RNA STL NAA+PROBE: NOT DETECTED
SALMONELLA DNA STL QL NAA+PROBE: NOT DETECTED
SHIGELLA DNA SPEC QL NAA+PROBE: NOT DETECTED
V CHOLERAE DNA STL QL NAA+PROBE: NOT DETECTED
Y ENTEROCOL DNA STL QL NAA+PROBE: NOT DETECTED

## 2024-03-29 NOTE — PROGRESS NOTES
Discharge Facility:[facility]  Discharge Diagnosis:K52.9 Enteritis  Admission Date:3/28/24  Discharge Date: 3/28/24    PCP Appointment Date:4/4/24  Specialist Appointment Date: N/A  Hospital Encounter and Summary: Linked       2 call attempt made

## 2024-04-04 ENCOUNTER — APPOINTMENT (OUTPATIENT)
Dept: PRIMARY CARE | Facility: CLINIC | Age: 34
End: 2024-04-04
Payer: COMMERCIAL

## 2024-04-09 ENCOUNTER — PATIENT OUTREACH (OUTPATIENT)
Dept: CARE COORDINATION | Facility: CLINIC | Age: 34
End: 2024-04-09
Payer: COMMERCIAL

## 2024-04-09 NOTE — PROGRESS NOTES
Unable to reach patient for call back after patient's follow up appointment with PCP.   FERNANDOM with call back number for patient to call if needed   If no voicemail available call attempts x 2 were made to contact the patient to assist with any questions or concerns patient may have.

## 2024-04-19 DIAGNOSIS — I10 PRIMARY HYPERTENSION: ICD-10-CM

## 2024-04-19 RX ORDER — METOPROLOL SUCCINATE 50 MG/1
50 TABLET, EXTENDED RELEASE ORAL 2 TIMES DAILY
Qty: 180 TABLET | Refills: 0 | Status: SHIPPED | OUTPATIENT
Start: 2024-04-19

## 2024-05-21 ENCOUNTER — LAB (OUTPATIENT)
Dept: LAB | Facility: LAB | Age: 34
End: 2024-05-21
Payer: COMMERCIAL

## 2024-05-21 ENCOUNTER — OFFICE VISIT (OUTPATIENT)
Dept: PRIMARY CARE | Facility: CLINIC | Age: 34
End: 2024-05-21
Payer: COMMERCIAL

## 2024-05-21 VITALS
DIASTOLIC BLOOD PRESSURE: 88 MMHG | HEIGHT: 64 IN | OXYGEN SATURATION: 98 % | BODY MASS INDEX: 41.36 KG/M2 | HEART RATE: 84 BPM | SYSTOLIC BLOOD PRESSURE: 116 MMHG | WEIGHT: 242.25 LBS

## 2024-05-21 DIAGNOSIS — R20.2 PARESTHESIA: Primary | ICD-10-CM

## 2024-05-21 DIAGNOSIS — R20.2 PARESTHESIA: ICD-10-CM

## 2024-05-21 LAB
25(OH)D3 SERPL-MCNC: 25 NG/ML (ref 30–100)
ALBUMIN SERPL BCP-MCNC: 4.5 G/DL (ref 3.4–5)
ALP SERPL-CCNC: 71 U/L (ref 33–110)
ALT SERPL W P-5'-P-CCNC: 23 U/L (ref 7–45)
ANION GAP SERPL CALC-SCNC: 13 MMOL/L (ref 10–20)
AST SERPL W P-5'-P-CCNC: 18 U/L (ref 9–39)
BASOPHILS # BLD MANUAL: 0.2 X10*3/UL (ref 0–0.1)
BASOPHILS NFR BLD MANUAL: 1 %
BILIRUB SERPL-MCNC: 0.4 MG/DL (ref 0–1.2)
BUN SERPL-MCNC: 8 MG/DL (ref 6–23)
CALCIUM SERPL-MCNC: 9.3 MG/DL (ref 8.6–10.3)
CHLORIDE SERPL-SCNC: 103 MMOL/L (ref 98–107)
CO2 SERPL-SCNC: 25 MMOL/L (ref 21–32)
CREAT SERPL-MCNC: 0.78 MG/DL (ref 0.5–1.05)
CRP SERPL-MCNC: 0.5 MG/DL
EGFRCR SERPLBLD CKD-EPI 2021: >90 ML/MIN/1.73M*2
EOSINOPHIL # BLD MANUAL: 1.01 X10*3/UL (ref 0–0.7)
EOSINOPHIL NFR BLD MANUAL: 5 %
ERYTHROCYTE [DISTWIDTH] IN BLOOD BY AUTOMATED COUNT: 13.9 % (ref 11.5–14.5)
ERYTHROCYTE [SEDIMENTATION RATE] IN BLOOD BY WESTERGREN METHOD: 17 MM/H (ref 0–20)
EST. AVERAGE GLUCOSE BLD GHB EST-MCNC: 111 MG/DL
FERRITIN SERPL-MCNC: 42 NG/ML (ref 8–150)
GLUCOSE SERPL-MCNC: 86 MG/DL (ref 74–99)
HBA1C MFR BLD: 5.5 %
HCT VFR BLD AUTO: 46.2 % (ref 36–46)
HGB BLD-MCNC: 15.2 G/DL (ref 12–16)
IMM GRANULOCYTES # BLD AUTO: 0.17 X10*3/UL (ref 0–0.7)
IMM GRANULOCYTES NFR BLD AUTO: 0.8 % (ref 0–0.9)
IRON SATN MFR SERPL: 14 % (ref 25–45)
IRON SERPL-MCNC: 63 UG/DL (ref 35–150)
LYMPHOCYTES # BLD MANUAL: 8.24 X10*3/UL (ref 1.2–4.8)
LYMPHOCYTES NFR BLD MANUAL: 41 %
MAGNESIUM SERPL-MCNC: 1.86 MG/DL (ref 1.6–2.4)
MCH RBC QN AUTO: 28.8 PG (ref 26–34)
MCHC RBC AUTO-ENTMCNC: 32.9 G/DL (ref 32–36)
MCV RBC AUTO: 88 FL (ref 80–100)
MONOCYTES # BLD MANUAL: 0.4 X10*3/UL (ref 0.1–1)
MONOCYTES NFR BLD MANUAL: 2 %
NEUTS SEG # BLD MANUAL: 10.05 X10*3/UL (ref 1.2–7)
NEUTS SEG NFR BLD MANUAL: 50 %
NRBC BLD-RTO: 0 /100 WBCS (ref 0–0)
PLATELET # BLD AUTO: 476 X10*3/UL (ref 150–450)
POTASSIUM SERPL-SCNC: 4 MMOL/L (ref 3.5–5.3)
PROT SERPL-MCNC: 7.1 G/DL (ref 6.4–8.2)
RBC # BLD AUTO: 5.28 X10*6/UL (ref 4–5.2)
RBC MORPH BLD: ABNORMAL
SODIUM SERPL-SCNC: 137 MMOL/L (ref 136–145)
TIBC SERPL-MCNC: 449 UG/DL (ref 240–445)
TOTAL CELLS COUNTED BLD: 100
TSH SERPL-ACNC: 1.86 MIU/L (ref 0.44–3.98)
UIBC SERPL-MCNC: 386 UG/DL (ref 110–370)
VARIANT LYMPHS # BLD MANUAL: 0.2 X10*3/UL (ref 0–0.5)
VARIANT LYMPHS NFR BLD: 1 %
VIT B12 SERPL-MCNC: 347 PG/ML (ref 211–911)
WBC # BLD AUTO: 20.1 X10*3/UL (ref 4.4–11.3)

## 2024-05-21 PROCEDURE — 83540 ASSAY OF IRON: CPT

## 2024-05-21 PROCEDURE — 82306 VITAMIN D 25 HYDROXY: CPT

## 2024-05-21 PROCEDURE — 86038 ANTINUCLEAR ANTIBODIES: CPT

## 2024-05-21 PROCEDURE — 3074F SYST BP LT 130 MM HG: CPT | Performed by: FAMILY MEDICINE

## 2024-05-21 PROCEDURE — 82728 ASSAY OF FERRITIN: CPT

## 2024-05-21 PROCEDURE — 85027 COMPLETE CBC AUTOMATED: CPT

## 2024-05-21 PROCEDURE — 3079F DIAST BP 80-89 MM HG: CPT | Performed by: FAMILY MEDICINE

## 2024-05-21 PROCEDURE — 83036 HEMOGLOBIN GLYCOSYLATED A1C: CPT

## 2024-05-21 PROCEDURE — 36415 COLL VENOUS BLD VENIPUNCTURE: CPT

## 2024-05-21 PROCEDURE — 86140 C-REACTIVE PROTEIN: CPT

## 2024-05-21 PROCEDURE — 99213 OFFICE O/P EST LOW 20 MIN: CPT | Performed by: FAMILY MEDICINE

## 2024-05-21 PROCEDURE — 86431 RHEUMATOID FACTOR QUANT: CPT

## 2024-05-21 PROCEDURE — 85652 RBC SED RATE AUTOMATED: CPT

## 2024-05-21 PROCEDURE — 84443 ASSAY THYROID STIM HORMONE: CPT

## 2024-05-21 PROCEDURE — 4004F PT TOBACCO SCREEN RCVD TLK: CPT | Performed by: FAMILY MEDICINE

## 2024-05-21 PROCEDURE — 83735 ASSAY OF MAGNESIUM: CPT

## 2024-05-21 PROCEDURE — 85007 BL SMEAR W/DIFF WBC COUNT: CPT

## 2024-05-21 PROCEDURE — 80053 COMPREHEN METABOLIC PANEL: CPT

## 2024-05-21 PROCEDURE — 3008F BODY MASS INDEX DOCD: CPT | Performed by: FAMILY MEDICINE

## 2024-05-21 PROCEDURE — 83550 IRON BINDING TEST: CPT

## 2024-05-21 PROCEDURE — 82607 VITAMIN B-12: CPT

## 2024-05-21 RX ORDER — OLANZAPINE AND SAMIDORPHAN L-MALATE 10; 10 MG/1; MG/1
1 TABLET, FILM COATED ORAL DAILY
COMMUNITY
Start: 2024-04-18

## 2024-05-21 RX ORDER — DEXTROAMPHETAMINE SACCHARATE, AMPHETAMINE ASPARTATE, DEXTROAMPHETAMINE SULFATE AND AMPHETAMINE SULFATE 2.5; 2.5; 2.5; 2.5 MG/1; MG/1; MG/1; MG/1
TABLET ORAL
COMMUNITY
Start: 2024-05-13

## 2024-05-21 RX ORDER — CLONAZEPAM 0.5 MG/1
0.5 TABLET ORAL 2 TIMES DAILY PRN
COMMUNITY
Start: 2024-04-03

## 2024-05-21 NOTE — PROGRESS NOTES
Subjective   Radha Corley is a 33 y.o. female who presents for No chief complaint on file..  Here c/o some issues with tongue burning, she is also having issues with her hands and feet going numb/tingling.  Sometimes it gets painful.  Her weight has been fluctuating quite a bit recently.  Happens off and on and seems to be getting worse.              Objective   Visit Vitals  /88 (BP Location: Left arm, Patient Position: Sitting, BP Cuff Size: Adult)   Pulse 84      Physical Exam  Vitals reviewed.   Constitutional:       General: She is not in acute distress.  Cardiovascular:      Rate and Rhythm: Normal rate and regular rhythm.      Heart sounds: No murmur heard.  Pulmonary:      Effort: Pulmonary effort is normal. No respiratory distress.      Breath sounds: Normal breath sounds.   Skin:     General: Skin is warm and dry.   Neurological:      General: No focal deficit present.      Mental Status: She is alert. Mental status is at baseline.         Assessment/Plan   Problem List Items Addressed This Visit    None  Visit Diagnoses       Paresthesia    -  Primary    Relevant Orders    CBC and Auto Differential (Completed)    Comprehensive Metabolic Panel    TSH with reflex to Free T4 if abnormal    Vitamin B12    Hemoglobin A1C    Magnesium    Vitamin D 25-Hydroxy,Total (for eval of Vitamin D levels)    Rheumatoid Factor    Sedimentation Rate    C-Reactive Protein    ZINA with Reflex to JACLYN    Iron and TIBC    Ferritin               Kerrie Fall MD

## 2024-05-21 NOTE — PROGRESS NOTES
Subjective   Patient ID: Radha Corley is a 33 y.o. female who presents for patients weight has been fluctuating for the last month. Also she has been having numbness in bilateral hands and feet. The feet is not as often as the hands. Feels like pen and needles. Will happen when driving, painting or just sitting there, will often wake them up during the night.     HPI     Review of Systems    Objective   There were no vitals taken for this visit.    Physical Exam    Assessment/Plan

## 2024-05-22 LAB — RHEUMATOID FACT SER NEPH-ACNC: <10 IU/ML (ref 0–15)

## 2024-05-22 NOTE — PROGRESS NOTES
Anesthesia Post-op Note    Patient: Cait Camara  Procedure(s) Performed: ROBOTIC ASSISTED LAPAROSCOPIC VENTRAL HERNIA REPAIR WITH MESH (Abdomen)   Anesthesia type: General    Vitals Value Taken Time   Temp 36.1 05/22/24 1017   Pulse 52 05/22/24 1017   Resp 22 05/22/24 1015   SpO2 100 % 05/22/24 1015   /75 05/22/24 1013   Vitals shown include unfiled device data.      Patient Location: PACU Phase 1  Post-op Vital Signs:stable  Level of Consciousness: awake and alert  Respiratory Status: spontaneous ventilation and face mask  Cardiovascular stable  Hydration: euvolemic  Pain Management: adequately controlled  Handoff: Handoff to receiving clinician was performed and questions were answered  Vomiting: none  Nausea: None  Airway Patency:patent  Post-op Assessment: no complications and patient tolerated procedure well      No notable events documented.                       Pt had labs drawn at clinic today  Rtc for cnp visit and results 2/5 at 230  Reviewed AVS with patient- patient verbalizes understanding

## 2024-05-24 ENCOUNTER — TELEPHONE (OUTPATIENT)
Dept: PRIMARY CARE | Facility: CLINIC | Age: 34
End: 2024-05-24
Payer: COMMERCIAL
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
## 2024-05-24 RX ORDER — DOXYCYCLINE 100 MG/1
100 CAPSULE ORAL 2 TIMES DAILY
Qty: 20 CAPSULE | Refills: 0 | Status: SHIPPED | OUTPATIENT
Start: 2024-05-24 | End: 2024-06-03

## 2024-05-24 NOTE — TELEPHONE ENCOUNTER
Patient called and states that they had a tick yesterday. Now today they have a bullseye george. She doesn't know how long it was there. She is asking what she needs to do.

## 2024-05-27 LAB — ANA SER QL HEP2 SUBST: NEGATIVE

## 2024-05-28 NOTE — RESULT ENCOUNTER NOTE
Please let patient know that her labs overall were ok, but her vitamin D was low and some of her iron studies were indicative of low iron stores.  I would suggest increasing vitamin D supplement and iron.  Her WBC count is elevated and this could be due to her medication or possibly due to infection.  How is she feeling?

## 2024-05-30 NOTE — RESULT ENCOUNTER NOTE
OK, she actually called me last night with increased BP and headaches and I did advise her to increase her metoprolol to 2 pills twice a day and schedule a follow up appointment here soon.  Please see if we can get her on the schedule in the next week.  Thanks.

## 2024-07-17 DIAGNOSIS — I10 PRIMARY HYPERTENSION: ICD-10-CM

## 2024-07-17 RX ORDER — SPIRONOLACTONE 25 MG/1
25 TABLET ORAL DAILY
Qty: 90 TABLET | Refills: 1 | Status: SHIPPED | OUTPATIENT
Start: 2024-07-17

## 2024-07-17 RX ORDER — METOPROLOL SUCCINATE 50 MG/1
50 TABLET, EXTENDED RELEASE ORAL 2 TIMES DAILY
Qty: 180 TABLET | Refills: 1 | Status: SHIPPED | OUTPATIENT
Start: 2024-07-17

## 2024-07-29 ENCOUNTER — APPOINTMENT (OUTPATIENT)
Dept: HEMATOLOGY/ONCOLOGY | Facility: CLINIC | Age: 34
End: 2024-07-29
Payer: COMMERCIAL

## 2024-07-29 NOTE — PROGRESS NOTES
Patient ID: Radha Corley is a 33 y.o. female.    Subjective    HPI    HPI        Patient is a 34 yo HTN, bipolar, borderline personality disorder, ADHD, Autism, Vitamin D Defeincey with a PMH of  and was referred to benign hematology for consultation of iron deficiency.      Patient reports that she has struggled with iron deficiency for awhile. In the past she has required IV infusions. Patient's CBC on 10/28/23 results Hg 12.2, MCV 80, MCHC 31.3, indicating microcytic anemia, remainder of CBC are stable.  Iron parameters on 10/28/23 results were Fe 33, Fe sat 7%, Ferritin- no results reported- indicating iron deficiency. Patient was started on oral iron, she admits not always being compliant due to GI side effects.      Today, patient presents for initial consultation. Patient reports poor energy, able to work outside the home and complete ADLs, always feels fatigues. Appetite is poor due to tongue soreness, heaviness and burning sensation. Occasional nausea due to change in anxiety/depression medications, Denies any vomiting. Chronic diarrhea, no hematochezia or melenic stools.  No abdominal pain, cramping or early satiety. Denies any urinary issues, dysuria or hematuria.  Craves ice daily.  Denies abnormal weight loss, night sweats, fever. Recently treated for bronchitis and staph infection, resolved at this time.  Denies fatigue, chills, sob, early satiety. Denies any abnormal bleeding or bruising. No recurrent infections or lymphadenopathy. No bone pain. Denies any brittle/dry/or hair loss, nails have always peeled and been soft/weak. No known blood disorders in family. Has had surgery in past w/o issue.      Patient reports started menses at age 10. Menstrual cycle is irregular, typically last 7 days with heavy bleeding and clotting. Patient is scheduled to see OB/GYN this week to discuss treatment options.      up to date on cancer screenings-      Interval History 3/21/24     Appetite is decent.  Slight improved with swelling/burning tongue. Chronic diarrhea and nausea due to medications, but has increased since recent procedure. Uterine Ablation done on almost week ago, mild cramping spotting. No urinary issues, dysuria, hematuria. Part 3 days reports night sweats. Weight is stable. Remains smoke.  Energy remains low, fatigues easily.      Fainting/Nausea or lightheadedness/dizziness is worse with activity. Drinking lots of water.      PAST MEDICAL HISTORY:  depression/anxiety, rosacea, allergic rhinitis, obesity     SOCIAL HISTORY:  Regular Diet  Smoker- 1/2 ppd cigarettes and marijuana - +1 year   ETOH- None  (3) pregnancies  she has 2 children  STNA     FAMILY HISTORY:  grandfather and 2 uncles had colon cancer, uncles were in their 50s  aunt had breast cancer, she was in her 40s  No other specific history of bleeding, clotting or malignant disorder in the     Objective    BSA: There is no height or weight on file to calculate BSA.  There were no vitals taken for this visit.     Physical Exam    Performance Status:  {ECOG performance status:16537}      Assessment/Plan   1. Microcytic anemia  most likely iron deficiency anemia secondary to menstrual blood losses  she follows Maritza Olivarez, OB/gyn  will plan to verify iron deficiency status with ferritin, iron, TIBC levels  check additional anemia labs:  -B12 and folic acid  -LDH, retic and haptoglobin  - ZINA, Rh factor  -TSH     Plan for IV iron replacement if iron deficiency confirmed, she has already GI  complaints and would like to avoid any potential exacerbation with PO iron     Patient was provided educaton on IV iron without premedication, observation for hypersensitivity reaction for 30 minutes post infusion. Side effects of Feraheme were explained, including but are not limited to hypotension, edema, chest pain, hypertension, dizziness, headache, fatigue, pruritus, rash, diarrhea, nausea, constipation, vomiting, abdominal pain, hypersensitivity  reaction which can be severe and potentially life-threatening, pain, muscle spasm, shortness of breath, cough, fever, anaphylaxis, angioedema, arrhythmia, cardiac failure, ischemic heart disease, syncope, urticaria-patient agrees to proceed if needed.        Hematological Labs were essentially negative expect for iron deficiency. Iron parameters- Fe 37, Fe sat 8%, Ferritin 18. CBC was stable, Hg 12.3, WBC 9.7, Platelets 432k. No hemolysis was present. Folate, TSH, B12, ZINA, and Rheumatoid factors were normal.    Interval 3/21/24:     Interval 3/21/24     Patient is post iron infusions, well tolerated. Patient recently had a uterine ablation a week ago- remains to have light spotting and mild cramping. Otherwise she reports that she feels generally well. She has episodes of feeling nausea, and flushed. Since her procedure she has experienced a few episodes of almost fainting/lightheaded-dizziness with nausea, seems to be worse with activity. She reports a flank pain of a 3 today, she denies any hematuria or dysuria. Night sweats over the past few days, denies any fever. Her tongue remains to feel swollen with a burning sensation. Patient remains to have chronic diarrhea, and nausea due to medications.      Patient was previously referred to ENT, however local ENT does not accept patient insurance. Will submit a new referral, advised patient to contact insurance to verify a provider in network.     No labs reported. Will plan to draw labs today-CBC w/diff, CMP, Ferritin, Iron, B12     Reviewed labs:  3/21/24- Hg 13.7, MCV 83, MCHC 33.3, WBC 10.9, Plt 342k  Fe 74, Fe sat 21%, Ferritin 197, B12 392     Iron parameters have improved. Will continue to monitor patients labs for replacement efficacy.      2. Anxiety/ Depression   Follows psychiatry     3. Diarrhea  Advised follow-up with GI as needed         Plan:  Referral to ENT  Labs today   RTC in 3 months with labs prior        Yoanna Whyte, SANTOS-CNP

## 2024-08-05 ENCOUNTER — TELEPHONE (OUTPATIENT)
Dept: PRIMARY CARE | Facility: CLINIC | Age: 34
End: 2024-08-05
Payer: COMMERCIAL

## 2024-08-05 NOTE — TELEPHONE ENCOUNTER
PATIENT CALLING FOR A REFILL ON SPIRON\LACTONE.   PATIENT SAYS IT IS NOT REALLY WORKING, DO YOU WANT TO INCREASE DOSE ?

## 2024-08-05 NOTE — TELEPHONE ENCOUNTER
I can send in her current dose - if she would like to adjust it I would recommend an appointment to discuss.  Thanks.

## 2024-08-06 NOTE — TELEPHONE ENCOUNTER
If it is the spironolactone she should have refills at [facility] - we sent in 90 with 1 refill on July 17

## 2024-08-07 ENCOUNTER — LAB (OUTPATIENT)
Dept: LAB | Facility: LAB | Age: 34
End: 2024-08-07
Payer: COMMERCIAL

## 2024-08-07 DIAGNOSIS — R39.9 URINARY SYMPTOM OR SIGN: ICD-10-CM

## 2024-08-07 DIAGNOSIS — R20.8 BURNING SENSATION OF MOUTH: ICD-10-CM

## 2024-08-07 DIAGNOSIS — D50.9 IRON DEFICIENCY ANEMIA, UNSPECIFIED IRON DEFICIENCY ANEMIA TYPE: ICD-10-CM

## 2024-08-07 LAB
ALBUMIN SERPL BCP-MCNC: 4.1 G/DL (ref 3.4–5)
ALP SERPL-CCNC: 69 U/L (ref 33–110)
ALT SERPL W P-5'-P-CCNC: 34 U/L (ref 7–45)
ANION GAP SERPL CALC-SCNC: 9 MMOL/L (ref 10–20)
AST SERPL W P-5'-P-CCNC: 22 U/L (ref 9–39)
BASOPHILS # BLD AUTO: 0.06 X10*3/UL (ref 0–0.1)
BASOPHILS NFR BLD AUTO: 0.5 %
BILIRUB SERPL-MCNC: 0.3 MG/DL (ref 0–1.2)
BUN SERPL-MCNC: 11 MG/DL (ref 6–23)
CALCIUM SERPL-MCNC: 8.8 MG/DL (ref 8.6–10.3)
CHLORIDE SERPL-SCNC: 106 MMOL/L (ref 98–107)
CO2 SERPL-SCNC: 27 MMOL/L (ref 21–32)
CREAT SERPL-MCNC: 0.71 MG/DL (ref 0.5–1.05)
EGFRCR SERPLBLD CKD-EPI 2021: >90 ML/MIN/1.73M*2
EOSINOPHIL # BLD AUTO: 0.69 X10*3/UL (ref 0–0.7)
EOSINOPHIL NFR BLD AUTO: 5.4 %
ERYTHROCYTE [DISTWIDTH] IN BLOOD BY AUTOMATED COUNT: 14 % (ref 11.5–14.5)
FERRITIN SERPL-MCNC: 59 NG/ML (ref 8–150)
GLUCOSE SERPL-MCNC: 96 MG/DL (ref 74–99)
HCT VFR BLD AUTO: 45 % (ref 36–46)
HGB BLD-MCNC: 14.7 G/DL (ref 12–16)
IMM GRANULOCYTES # BLD AUTO: 0.03 X10*3/UL (ref 0–0.7)
IMM GRANULOCYTES NFR BLD AUTO: 0.2 % (ref 0–0.9)
IRON SATN MFR SERPL: 15 % (ref 25–45)
IRON SERPL-MCNC: 58 UG/DL (ref 35–150)
LYMPHOCYTES # BLD AUTO: 2.74 X10*3/UL (ref 1.2–4.8)
LYMPHOCYTES NFR BLD AUTO: 21.3 %
MCH RBC QN AUTO: 29.6 PG (ref 26–34)
MCHC RBC AUTO-ENTMCNC: 32.7 G/DL (ref 32–36)
MCV RBC AUTO: 91 FL (ref 80–100)
MONOCYTES # BLD AUTO: 0.61 X10*3/UL (ref 0.1–1)
MONOCYTES NFR BLD AUTO: 4.7 %
NEUTROPHILS # BLD AUTO: 8.75 X10*3/UL (ref 1.2–7.7)
NEUTROPHILS NFR BLD AUTO: 67.9 %
NRBC BLD-RTO: 0 /100 WBCS (ref 0–0)
PLATELET # BLD AUTO: 345 X10*3/UL (ref 150–450)
POTASSIUM SERPL-SCNC: 4.2 MMOL/L (ref 3.5–5.3)
PROT SERPL-MCNC: 6.4 G/DL (ref 6.4–8.2)
RBC # BLD AUTO: 4.97 X10*6/UL (ref 4–5.2)
SODIUM SERPL-SCNC: 138 MMOL/L (ref 136–145)
TIBC SERPL-MCNC: 399 UG/DL (ref 240–445)
UIBC SERPL-MCNC: 341 UG/DL (ref 110–370)
WBC # BLD AUTO: 12.9 X10*3/UL (ref 4.4–11.3)

## 2024-08-07 PROCEDURE — 82728 ASSAY OF FERRITIN: CPT

## 2024-08-07 PROCEDURE — 36415 COLL VENOUS BLD VENIPUNCTURE: CPT

## 2024-08-07 PROCEDURE — 83550 IRON BINDING TEST: CPT

## 2024-08-07 PROCEDURE — 82607 VITAMIN B-12: CPT

## 2024-08-07 PROCEDURE — 80053 COMPREHEN METABOLIC PANEL: CPT

## 2024-08-07 PROCEDURE — 85025 COMPLETE CBC W/AUTO DIFF WBC: CPT

## 2024-08-07 PROCEDURE — 83540 ASSAY OF IRON: CPT

## 2024-08-08 LAB — VIT B12 SERPL-MCNC: 394 PG/ML (ref 211–911)

## 2024-08-09 ENCOUNTER — OFFICE VISIT (OUTPATIENT)
Age: 34
End: 2024-08-09
Payer: COMMERCIAL

## 2024-08-09 VITALS
OXYGEN SATURATION: 100 % | SYSTOLIC BLOOD PRESSURE: 114 MMHG | HEIGHT: 64 IN | HEART RATE: 58 BPM | BODY MASS INDEX: 41.26 KG/M2 | WEIGHT: 241.7 LBS | DIASTOLIC BLOOD PRESSURE: 60 MMHG

## 2024-08-09 DIAGNOSIS — I10 PRIMARY HYPERTENSION: ICD-10-CM

## 2024-08-09 RX ORDER — SPIRONOLACTONE 50 MG/1
50 TABLET, FILM COATED ORAL DAILY
Qty: 90 TABLET | Refills: 1 | Status: SHIPPED | OUTPATIENT
Start: 2024-08-09

## 2024-08-09 NOTE — PATIENT INSTRUCTIONS
Will increase spironolactone to 50 mg daily, continue other meds, continue to monitor her BP - if it drops she will let us know.

## 2024-08-09 NOTE — PROGRESS NOTES
Subjective   Patient ID: Radha Corley is a 33 y.o. female who presents for medication of Spironolactone isn't working and was advised to come in to discuss.     HPI     Review of Systems    Objective   There were no vitals taken for this visit.    Physical Exam    Assessment/Plan

## 2024-08-09 NOTE — PROGRESS NOTES
Subjective   Radha Corley is a 33 y.o. female who presents for No chief complaint on file..  Here stating that she continues to have a lot of swelling.  The spironolactone did help initially, just not so much right now.  No other significant complaints at this time.  She does continue to see her specialists regularly and did just have labs recently.            Objective   Visit Vitals  /60 (BP Location: Left arm, Patient Position: Sitting, BP Cuff Size: Adult)   Pulse 58      Physical Exam  Vitals reviewed.   Constitutional:       General: She is not in acute distress.  Cardiovascular:      Rate and Rhythm: Normal rate.   Pulmonary:      Effort: Pulmonary effort is normal. No respiratory distress.   Musculoskeletal:      Comments: Mild-mod edema bilaterally   Skin:     General: Skin is warm and dry.   Neurological:      General: No focal deficit present.      Mental Status: She is alert. Mental status is at baseline.         Assessment/Plan   Problem List Items Addressed This Visit       Primary hypertension    Relevant Medications    spironolactone (Aldactone) 50 mg tablet          Kerrie Fall MD

## 2024-09-04 ENCOUNTER — OFFICE VISIT (OUTPATIENT)
Age: 34
End: 2024-09-04
Payer: COMMERCIAL

## 2024-09-04 VITALS
HEIGHT: 64 IN | BODY MASS INDEX: 39.61 KG/M2 | WEIGHT: 232 LBS | SYSTOLIC BLOOD PRESSURE: 134 MMHG | DIASTOLIC BLOOD PRESSURE: 80 MMHG | HEART RATE: 82 BPM

## 2024-09-04 DIAGNOSIS — R11.0 NAUSEA: ICD-10-CM

## 2024-09-04 DIAGNOSIS — R10.9 FLANK PAIN: ICD-10-CM

## 2024-09-04 DIAGNOSIS — R39.9 LOWER URINARY TRACT SYMPTOMS (LUTS): Primary | ICD-10-CM

## 2024-09-04 LAB
POC APPEARANCE, URINE: ABNORMAL
POC BILIRUBIN, URINE: ABNORMAL
POC BLOOD, URINE: ABNORMAL
POC COLOR, URINE: ABNORMAL
POC GLUCOSE, URINE: NEGATIVE MG/DL
POC KETONES, URINE: ABNORMAL MG/DL
POC LEUKOCYTES, URINE: ABNORMAL
POC NITRITE,URINE: POSITIVE
POC PH, URINE: 6 PH
POC PROTEIN, URINE: ABNORMAL MG/DL
POC SPECIFIC GRAVITY, URINE: >=1.03
POC UROBILINOGEN, URINE: 0.2 EU/DL

## 2024-09-04 PROCEDURE — 99213 OFFICE O/P EST LOW 20 MIN: CPT | Performed by: NURSE PRACTITIONER

## 2024-09-04 PROCEDURE — 87086 URINE CULTURE/COLONY COUNT: CPT

## 2024-09-04 PROCEDURE — 3075F SYST BP GE 130 - 139MM HG: CPT | Performed by: NURSE PRACTITIONER

## 2024-09-04 PROCEDURE — 87186 SC STD MICRODIL/AGAR DIL: CPT

## 2024-09-04 PROCEDURE — 3008F BODY MASS INDEX DOCD: CPT | Performed by: NURSE PRACTITIONER

## 2024-09-04 PROCEDURE — 81003 URINALYSIS AUTO W/O SCOPE: CPT | Performed by: NURSE PRACTITIONER

## 2024-09-04 PROCEDURE — 3079F DIAST BP 80-89 MM HG: CPT | Performed by: NURSE PRACTITIONER

## 2024-09-04 RX ORDER — CIPROFLOXACIN 500 MG/1
500 TABLET ORAL 2 TIMES DAILY
Qty: 10 TABLET | Refills: 0 | Status: SHIPPED | OUTPATIENT
Start: 2024-09-04 | End: 2024-09-09

## 2024-09-04 RX ORDER — ONDANSETRON 4 MG/1
4 TABLET, FILM COATED ORAL EVERY 8 HOURS PRN
Qty: 20 TABLET | Refills: 0 | Status: SHIPPED | OUTPATIENT
Start: 2024-09-04 | End: 2024-09-11

## 2024-09-04 RX ORDER — TRAMADOL HYDROCHLORIDE 50 MG/1
25 TABLET ORAL EVERY 6 HOURS PRN
Qty: 2 TABLET | Refills: 0 | Status: SHIPPED | OUTPATIENT
Start: 2024-09-04

## 2024-09-04 RX ORDER — PHENAZOPYRIDINE HYDROCHLORIDE 200 MG/1
200 TABLET, FILM COATED ORAL 3 TIMES DAILY PRN
Qty: 30 TABLET | Refills: 0 | Status: SHIPPED | OUTPATIENT
Start: 2024-09-04 | End: 2024-09-14

## 2024-09-04 NOTE — LETTER
September 4, 2024     Patient: Radha Corley   YOB: 1990   Date of Visit: 9/4/2024       To Whom It May Concern:    Radha Corley was seen in my clinic on 9/4/2024 at 11:20 am. Please excuse Radha for her absence from work on this day today and yesterday due to illness.     If you have any questions or concerns, please don't hesitate to call.         Sincerely,         Sandy Ochoa, APRN-CNP        CC: No Recipients

## 2024-09-04 NOTE — PROGRESS NOTES
"Subjective   Patient ID: Radha Corley is a 33 y.o. female who presents for Urinary Frequency and Chills.    HPI   Radha (RENETTA) patient her with complaints of urinary frequency, chills, hematuria.     Symptoms started 2 days ago that included Dysuria, urinary frequency, hematuria, chills. She is having difficulty being able to sit for long periods of time due to discomfort in her pelvic area. She is also having back pain, her left side is worse than her right. She is also getting some intermittent nausea. She has been trying OTC tylenol/ibuprofen and heat without much symptom relief. She reports she missed work today due to her symptoms getting worse.     Review of Systems   Constitutional:  Positive for fatigue. Negative for fever.   Genitourinary:  Positive for dysuria, flank pain, frequency, hematuria and urgency. Negative for decreased urine volume.       Objective   /80   Pulse 82   Ht 1.626 m (5' 4\")   Wt 105 kg (232 lb)   BMI 39.82 kg/m²     Physical Exam  Vitals and nursing note reviewed.   Constitutional:       Appearance: Normal appearance.   Cardiovascular:      Rate and Rhythm: Normal rate and regular rhythm.      Pulses: Normal pulses.      Heart sounds: Normal heart sounds.   Pulmonary:      Effort: Pulmonary effort is normal.      Breath sounds: Normal breath sounds.   Abdominal:      Tenderness: There is right CVA tenderness and left CVA tenderness.   Musculoskeletal:        Back:       Comments: CVA Tender to palpation left > right   Skin:     General: Skin is warm and dry.   Neurological:      General: No focal deficit present.      Mental Status: She is alert and oriented to person, place, and time.   Psychiatric:         Mood and Affect: Mood normal.         Behavior: Behavior normal.         Thought Content: Thought content normal.         Judgment: Judgment normal.         Assessment/Plan   Problem List Items Addressed This Visit    None  Visit Diagnoses         Codes    " Lower urinary tract symptoms (LUTS)    -  Primary R39.9    Relevant Medications    phenazopyridine (Pyridium) 200 mg tablet    ciprofloxacin (Cipro) 500 mg tablet    Other Relevant Orders    POCT UA Automated manually resulted (Completed)    Urine Culture    Flank pain     R10.9    Relevant Medications    traMADol (Ultram) 50 mg tablet    Nausea     R11.0    Relevant Medications    ondansetron (Zofran) 4 mg tablet              We will contact her with results of her urine culture and change her ATB if necessary. Will provide her work note for today and tomorrow. Return precautions discussed, and when to seek emergent/urgent care.     For any new medications that were prescribed today, the patient was educated about their indications for use, administration, frequency and potential side effects of the medication.

## 2024-09-04 NOTE — LETTER
September 4, 2024     Patient: Radha Corley   YOB: 1990   Date of Visit: 9/4/2024       To Whom It May Concern:    Radha Corley was seen in my clinic on 9/4/2024 at 11:20 am. Please excuse Radha for her absence from work on this day today and tomorrow due to illness.     If you have any questions or concerns, please don't hesitate to call.         Sincerely,         Sandy Ochoa, APRN-CNP        CC: No Recipients

## 2024-09-05 ASSESSMENT — ENCOUNTER SYMPTOMS
FLANK PAIN: 1
FATIGUE: 1
FREQUENCY: 1
HEMATURIA: 1
DYSURIA: 1
FEVER: 0

## 2024-09-07 LAB — BACTERIA UR CULT: ABNORMAL

## 2024-09-09 ENCOUNTER — APPOINTMENT (OUTPATIENT)
Age: 34
End: 2024-09-09
Payer: COMMERCIAL

## 2024-09-30 ENCOUNTER — OFFICE VISIT (OUTPATIENT)
Age: 34
End: 2024-09-30
Payer: COMMERCIAL

## 2024-09-30 VITALS
HEART RATE: 86 BPM | TEMPERATURE: 97.3 F | DIASTOLIC BLOOD PRESSURE: 80 MMHG | OXYGEN SATURATION: 97 % | WEIGHT: 233.5 LBS | SYSTOLIC BLOOD PRESSURE: 126 MMHG | HEIGHT: 64 IN | BODY MASS INDEX: 39.86 KG/M2

## 2024-09-30 DIAGNOSIS — R35.0 FREQUENT URINATION: ICD-10-CM

## 2024-09-30 DIAGNOSIS — N30.00 ACUTE CYSTITIS WITHOUT HEMATURIA: Primary | ICD-10-CM

## 2024-09-30 LAB
POC APPEARANCE, URINE: ABNORMAL
POC BILIRUBIN, URINE: NEGATIVE
POC BLOOD, URINE: NEGATIVE
POC COLOR, URINE: YELLOW
POC GLUCOSE, URINE: NEGATIVE MG/DL
POC KETONES, URINE: NEGATIVE MG/DL
POC LEUKOCYTES, URINE: NEGATIVE
POC NITRITE,URINE: NEGATIVE
POC PH, URINE: 6 PH
POC PROTEIN, URINE: NEGATIVE MG/DL
POC SPECIFIC GRAVITY, URINE: 1.02
POC UROBILINOGEN, URINE: 0.2 EU/DL

## 2024-09-30 PROCEDURE — 99213 OFFICE O/P EST LOW 20 MIN: CPT | Performed by: FAMILY MEDICINE

## 2024-09-30 PROCEDURE — 81003 URINALYSIS AUTO W/O SCOPE: CPT | Performed by: FAMILY MEDICINE

## 2024-09-30 PROCEDURE — 3074F SYST BP LT 130 MM HG: CPT | Performed by: FAMILY MEDICINE

## 2024-09-30 PROCEDURE — 3079F DIAST BP 80-89 MM HG: CPT | Performed by: FAMILY MEDICINE

## 2024-09-30 PROCEDURE — 4004F PT TOBACCO SCREEN RCVD TLK: CPT | Performed by: FAMILY MEDICINE

## 2024-09-30 PROCEDURE — 3008F BODY MASS INDEX DOCD: CPT | Performed by: FAMILY MEDICINE

## 2024-09-30 RX ORDER — CIPROFLOXACIN 500 MG/1
500 TABLET ORAL 2 TIMES DAILY
Qty: 10 TABLET | Refills: 0 | Status: SHIPPED | OUTPATIENT
Start: 2024-09-30 | End: 2024-10-05

## 2024-09-30 RX ORDER — ONDANSETRON 8 MG/1
8 TABLET, ORALLY DISINTEGRATING ORAL EVERY 8 HOURS PRN
Qty: 20 TABLET | Refills: 0 | Status: SHIPPED | OUTPATIENT
Start: 2024-09-30 | End: 2024-10-07

## 2024-09-30 RX ORDER — NAPROXEN 375 MG/1
375 TABLET ORAL 2 TIMES DAILY PRN
Qty: 60 TABLET | Refills: 0 | Status: SHIPPED | OUTPATIENT
Start: 2024-09-30 | End: 2024-12-29

## 2024-09-30 NOTE — PROGRESS NOTES
Subjective   Patient ID: Radha Corley is a 33 y.o. female who presents for Possible UTI. Patient states this started on Saturday.  Lower back pain, patient feels clammy. Urgency to go. No issues to urinated just the urgency.     HPI     Review of Systems    Objective   There were no vitals taken for this visit.    Physical Exam    Assessment/Plan

## 2024-09-30 NOTE — PROGRESS NOTES
Subjective   Radha Corley is a 33 y.o. female who presents for No chief complaint on file..  Here c/o  possible UTI, has had low back pain, urinary urgency since Saturday.  Is feeling hot/cold, some nausea.             Objective   Visit Vitals  /80 (BP Location: Left arm, Patient Position: Sitting, BP Cuff Size: Adult)   Pulse 86   Temp 36.3 °C (97.3 °F)      Physical Exam  Vitals reviewed.   Constitutional:       General: She is not in acute distress.  Cardiovascular:      Rate and Rhythm: Normal rate.   Pulmonary:      Effort: Pulmonary effort is normal. No respiratory distress.   Skin:     General: Skin is warm and dry.   Neurological:      General: No focal deficit present.      Mental Status: She is alert. Mental status is at baseline.         Assessment/Plan   Problem List Items Addressed This Visit    None  Visit Diagnoses       Acute cystitis without hematuria    -  Primary    Relevant Medications    ciprofloxacin (Cipro) 500 mg tablet    ondansetron ODT (Zofran-ODT) 8 mg disintegrating tablet    naproxen (Naprosyn) 375 mg tablet    Frequent urination        Relevant Orders    POCT UA Automated manually resulted (Completed)               Kerrie Fall MD

## 2024-09-30 NOTE — LETTER
September 30, 2024     Patient: Radha Corley   YOB: 1990   Date of Visit: 9/30/2024       To Whom It May Concern:    Radha Corley was seen in my clinic on 9/30/2024 at 11:20 am. Please excuse Radha for her absence from work on this day to make the appointment.    If you have any questions or concerns, please don't hesitate to call.         Sincerely,         Kerrie Fall MD        CC: No Recipients

## 2024-11-25 ENCOUNTER — OFFICE VISIT (OUTPATIENT)
Age: 34
End: 2024-11-25
Payer: COMMERCIAL

## 2024-11-25 VITALS
BODY MASS INDEX: 40.31 KG/M2 | HEART RATE: 104 BPM | HEIGHT: 64 IN | DIASTOLIC BLOOD PRESSURE: 78 MMHG | WEIGHT: 236.1 LBS | SYSTOLIC BLOOD PRESSURE: 100 MMHG | OXYGEN SATURATION: 97 %

## 2024-11-25 DIAGNOSIS — L02.91 ABSCESS: ICD-10-CM

## 2024-11-25 DIAGNOSIS — N64.52 NIPPLE DISCHARGE: ICD-10-CM

## 2024-11-25 DIAGNOSIS — N63.10 MASS OF RIGHT BREAST, UNSPECIFIED QUADRANT: Primary | ICD-10-CM

## 2024-11-25 PROCEDURE — 99213 OFFICE O/P EST LOW 20 MIN: CPT | Performed by: FAMILY MEDICINE

## 2024-11-25 PROCEDURE — 3074F SYST BP LT 130 MM HG: CPT | Performed by: FAMILY MEDICINE

## 2024-11-25 PROCEDURE — 3008F BODY MASS INDEX DOCD: CPT | Performed by: FAMILY MEDICINE

## 2024-11-25 PROCEDURE — 3078F DIAST BP <80 MM HG: CPT | Performed by: FAMILY MEDICINE

## 2024-11-25 PROCEDURE — 4004F PT TOBACCO SCREEN RCVD TLK: CPT | Performed by: FAMILY MEDICINE

## 2024-11-25 RX ORDER — DOXYCYCLINE 100 MG/1
100 CAPSULE ORAL 2 TIMES DAILY
Qty: 20 CAPSULE | Refills: 0 | Status: SHIPPED | OUTPATIENT
Start: 2024-11-25 | End: 2024-12-05

## 2024-11-25 RX ORDER — LITHIUM CARBONATE 300 MG/1
300 TABLET, FILM COATED, EXTENDED RELEASE ORAL NIGHTLY
COMMUNITY
Start: 2024-09-06 | End: 2024-11-25 | Stop reason: WASHOUT

## 2024-11-25 NOTE — PROGRESS NOTES
Subjective   Patient ID: Radha Corley is a 34 y.o. female who presents for right breast lump Saturday. Patient states she noticed that it was discolored and nipple discharge for a couple months.     HPI     Review of Systems    Objective   There were no vitals taken for this visit.    Physical Exam    Assessment/Plan

## 2024-11-25 NOTE — PROGRESS NOTES
Subjective   Radha Corley is a 34 y.o. female who presents for No chief complaint on file..  Here c/o lump on her breast.  She noticed it on Saturday.  It is not painful.  She has noticed some skin changes.  She also mentions that she has had bilateral nipple discharge off and on for a while.      Family history of breast cancer in an aunt in her 30s.              Objective   Visit Vitals  /78 (BP Location: Left arm, Patient Position: Sitting, BP Cuff Size: Adult)   Pulse 104      Physical Exam  Vitals reviewed.   Constitutional:       General: She is not in acute distress.  Cardiovascular:      Rate and Rhythm: Normal rate and regular rhythm.   Pulmonary:      Effort: Pulmonary effort is normal. No respiratory distress.   Chest:      Comments: There is darker skin with firm circular area on the inferior portion of the right breast.  No drainage at this time.    Skin:     General: Skin is warm and dry.   Neurological:      General: No focal deficit present.      Mental Status: She is alert. Mental status is at baseline.         Assessment/Plan   Problem List Items Addressed This Visit    None  Visit Diagnoses       Mass of right breast, unspecified quadrant    -  Primary    Relevant Orders    BI mammo bilateral diagnostic tomosynthesis    BI US breast limited right    Nipple discharge        Relevant Orders    BI mammo bilateral diagnostic tomosynthesis    BI US breast limited right    Abscess        Relevant Medications    doxycycline (Vibramycin) 100 mg capsule               Kerrie Fall MD

## 2024-11-25 NOTE — PATIENT INSTRUCTIONS
Will treat as skin infection/abscess but will also get mammogram/US and follow up based on results.

## 2025-01-31 ENCOUNTER — OFFICE VISIT (OUTPATIENT)
Age: 35
End: 2025-01-31
Payer: COMMERCIAL

## 2025-01-31 VITALS
SYSTOLIC BLOOD PRESSURE: 130 MMHG | WEIGHT: 236.1 LBS | HEART RATE: 104 BPM | DIASTOLIC BLOOD PRESSURE: 80 MMHG | BODY MASS INDEX: 40.53 KG/M2 | OXYGEN SATURATION: 97 %

## 2025-01-31 DIAGNOSIS — N20.0 KIDNEY STONE: Primary | ICD-10-CM

## 2025-01-31 PROCEDURE — 99213 OFFICE O/P EST LOW 20 MIN: CPT | Performed by: FAMILY MEDICINE

## 2025-01-31 PROCEDURE — 3075F SYST BP GE 130 - 139MM HG: CPT | Performed by: FAMILY MEDICINE

## 2025-01-31 PROCEDURE — 3079F DIAST BP 80-89 MM HG: CPT | Performed by: FAMILY MEDICINE

## 2025-01-31 NOTE — LETTER
January 31, 2025     Patient: Radha Corley   YOB: 1990   Date of Visit: 1/31/2025       To Whom It May Concern:    Radha Corley was seen in my clinic on 1/31/2025 at 8:20 am. She may return to work without restrictions on Sun 2/2/25.      If you have any questions or concerns, please don't hesitate to call.         Sincerely,         Kerrie Fall MD        CC: No Recipients

## 2025-01-31 NOTE — PATIENT INSTRUCTIONS
She may return to work without restrictions. If she has any further issues she will follow up either here or with urology.

## 2025-01-31 NOTE — PROGRESS NOTES
Subjective   Radha Corley is a 34 y.o. female who presents for Follow-up (Needs to go back to work-kidney stonesw).  Here stating that she had some kidney stones.  She states that she passed them at home - did not go to ER or see anyone for it.  She states that she occasionally has some twinges of pain, no further bleeding.  She states that she missed work yesterday and the day before.  She has seen Dr Raza in the past.  She states that she just needs a note saying that she can return to work.             Objective   Visit Vitals  /80   Pulse 104      Physical Exam  Vitals reviewed.   Constitutional:       General: She is not in acute distress.  Cardiovascular:      Rate and Rhythm: Normal rate.   Pulmonary:      Effort: Pulmonary effort is normal. No respiratory distress.   Skin:     General: Skin is warm and dry.   Neurological:      General: No focal deficit present.      Mental Status: She is alert. Mental status is at baseline.         Assessment/Plan   Problem List Items Addressed This Visit       Kidney stone - Primary          Kerrie Fall MD

## 2025-04-14 ENCOUNTER — APPOINTMENT (OUTPATIENT)
Dept: RADIOLOGY | Facility: HOSPITAL | Age: 35
End: 2025-04-14
Payer: COMMERCIAL

## 2025-04-14 ENCOUNTER — HOSPITAL ENCOUNTER (EMERGENCY)
Facility: HOSPITAL | Age: 35
Discharge: HOME | End: 2025-04-14
Attending: EMERGENCY MEDICINE
Payer: COMMERCIAL

## 2025-04-14 VITALS
WEIGHT: 215 LBS | OXYGEN SATURATION: 97 % | DIASTOLIC BLOOD PRESSURE: 63 MMHG | RESPIRATION RATE: 16 BRPM | HEIGHT: 64 IN | BODY MASS INDEX: 36.7 KG/M2 | TEMPERATURE: 98.5 F | SYSTOLIC BLOOD PRESSURE: 99 MMHG | HEART RATE: 68 BPM

## 2025-04-14 DIAGNOSIS — M54.50 BILATERAL LOW BACK PAIN WITHOUT SCIATICA, UNSPECIFIED CHRONICITY: Primary | ICD-10-CM

## 2025-04-14 DIAGNOSIS — K42.9 UMBILICAL HERNIA WITHOUT OBSTRUCTION AND WITHOUT GANGRENE: ICD-10-CM

## 2025-04-14 LAB
AMORPH CRY #/AREA UR COMP ASSIST: NORMAL /HPF
ANION GAP SERPL CALC-SCNC: 12 MMOL/L (ref 10–20)
APPEARANCE UR: ABNORMAL
BASOPHILS # BLD AUTO: 0.08 X10*3/UL (ref 0–0.1)
BASOPHILS NFR BLD AUTO: 0.6 %
BILIRUB UR STRIP.AUTO-MCNC: NEGATIVE MG/DL
BUN SERPL-MCNC: 11 MG/DL (ref 6–23)
CALCIUM SERPL-MCNC: 9.1 MG/DL (ref 8.6–10.3)
CHLORIDE SERPL-SCNC: 102 MMOL/L (ref 98–107)
CO2 SERPL-SCNC: 29 MMOL/L (ref 21–32)
COLOR UR: YELLOW
CREAT SERPL-MCNC: 0.64 MG/DL (ref 0.5–1.05)
EGFRCR SERPLBLD CKD-EPI 2021: >90 ML/MIN/1.73M*2
EOSINOPHIL # BLD AUTO: 0.84 X10*3/UL (ref 0–0.7)
EOSINOPHIL NFR BLD AUTO: 6.5 %
ERYTHROCYTE [DISTWIDTH] IN BLOOD BY AUTOMATED COUNT: 13.2 % (ref 11.5–14.5)
GLUCOSE SERPL-MCNC: 80 MG/DL (ref 74–99)
GLUCOSE UR STRIP.AUTO-MCNC: NORMAL MG/DL
HCG UR QL IA.RAPID: NEGATIVE
HCT VFR BLD AUTO: 39.2 % (ref 36–46)
HGB BLD-MCNC: 13.3 G/DL (ref 12–16)
IMM GRANULOCYTES # BLD AUTO: 0.05 X10*3/UL (ref 0–0.7)
IMM GRANULOCYTES NFR BLD AUTO: 0.4 % (ref 0–0.9)
KETONES UR STRIP.AUTO-MCNC: NEGATIVE MG/DL
LEUKOCYTE ESTERASE UR QL STRIP.AUTO: NEGATIVE
LYMPHOCYTES # BLD AUTO: 4.11 X10*3/UL (ref 1.2–4.8)
LYMPHOCYTES NFR BLD AUTO: 31.7 %
MCH RBC QN AUTO: 29.4 PG (ref 26–34)
MCHC RBC AUTO-ENTMCNC: 33.9 G/DL (ref 32–36)
MCV RBC AUTO: 87 FL (ref 80–100)
MONOCYTES # BLD AUTO: 0.72 X10*3/UL (ref 0.1–1)
MONOCYTES NFR BLD AUTO: 5.6 %
MUCOUS THREADS #/AREA URNS AUTO: NORMAL /LPF
NEUTROPHILS # BLD AUTO: 7.15 X10*3/UL (ref 1.2–7.7)
NEUTROPHILS NFR BLD AUTO: 55.2 %
NITRITE UR QL STRIP.AUTO: NEGATIVE
NRBC BLD-RTO: 0 /100 WBCS (ref 0–0)
PH UR STRIP.AUTO: 8 [PH]
PLATELET # BLD AUTO: 327 X10*3/UL (ref 150–450)
POTASSIUM SERPL-SCNC: 3.7 MMOL/L (ref 3.5–5.3)
PROT UR STRIP.AUTO-MCNC: ABNORMAL MG/DL
RBC # BLD AUTO: 4.53 X10*6/UL (ref 4–5.2)
RBC # UR STRIP.AUTO: NEGATIVE MG/DL
RBC #/AREA URNS AUTO: NORMAL /HPF
SODIUM SERPL-SCNC: 139 MMOL/L (ref 136–145)
SP GR UR STRIP.AUTO: 1.02
SQUAMOUS #/AREA URNS AUTO: NORMAL /HPF
UROBILINOGEN UR STRIP.AUTO-MCNC: NORMAL MG/DL
WBC # BLD AUTO: 13 X10*3/UL (ref 4.4–11.3)
WBC #/AREA URNS AUTO: NORMAL /HPF

## 2025-04-14 PROCEDURE — 81025 URINE PREGNANCY TEST: CPT | Performed by: EMERGENCY MEDICINE

## 2025-04-14 PROCEDURE — 85025 COMPLETE CBC W/AUTO DIFF WBC: CPT | Performed by: EMERGENCY MEDICINE

## 2025-04-14 PROCEDURE — 99284 EMERGENCY DEPT VISIT MOD MDM: CPT | Mod: 25 | Performed by: EMERGENCY MEDICINE

## 2025-04-14 PROCEDURE — 74176 CT ABD & PELVIS W/O CONTRAST: CPT

## 2025-04-14 PROCEDURE — 96375 TX/PRO/DX INJ NEW DRUG ADDON: CPT

## 2025-04-14 PROCEDURE — 36415 COLL VENOUS BLD VENIPUNCTURE: CPT | Performed by: EMERGENCY MEDICINE

## 2025-04-14 PROCEDURE — 96374 THER/PROPH/DIAG INJ IV PUSH: CPT

## 2025-04-14 PROCEDURE — 74176 CT ABD & PELVIS W/O CONTRAST: CPT | Performed by: RADIOLOGY

## 2025-04-14 PROCEDURE — 2500000004 HC RX 250 GENERAL PHARMACY W/ HCPCS (ALT 636 FOR OP/ED): Performed by: EMERGENCY MEDICINE

## 2025-04-14 PROCEDURE — 80048 BASIC METABOLIC PNL TOTAL CA: CPT | Performed by: EMERGENCY MEDICINE

## 2025-04-14 PROCEDURE — 81003 URINALYSIS AUTO W/O SCOPE: CPT | Performed by: EMERGENCY MEDICINE

## 2025-04-14 RX ORDER — CLONIDINE HYDROCHLORIDE 0.1 MG/1
0.1 TABLET ORAL 2 TIMES DAILY
COMMUNITY

## 2025-04-14 RX ORDER — ONDANSETRON HYDROCHLORIDE 2 MG/ML
4 INJECTION, SOLUTION INTRAVENOUS ONCE
Status: COMPLETED | OUTPATIENT
Start: 2025-04-14 | End: 2025-04-14

## 2025-04-14 RX ORDER — KETOROLAC TROMETHAMINE 30 MG/ML
15 INJECTION, SOLUTION INTRAMUSCULAR; INTRAVENOUS ONCE
Status: COMPLETED | OUTPATIENT
Start: 2025-04-14 | End: 2025-04-14

## 2025-04-14 RX ORDER — GABAPENTIN 300 MG/1
300 CAPSULE ORAL 2 TIMES DAILY
COMMUNITY

## 2025-04-14 RX ADMIN — ONDANSETRON 4 MG: 2 INJECTION, SOLUTION INTRAMUSCULAR; INTRAVENOUS at 18:59

## 2025-04-14 RX ADMIN — KETOROLAC TROMETHAMINE 15 MG: 30 INJECTION, SOLUTION INTRAMUSCULAR; INTRAVENOUS at 17:38

## 2025-04-14 ASSESSMENT — PAIN DESCRIPTION - PAIN TYPE: TYPE: ACUTE PAIN

## 2025-04-14 ASSESSMENT — PAIN DESCRIPTION - LOCATION: LOCATION: OTHER (COMMENT)

## 2025-04-14 ASSESSMENT — PAIN SCALES - GENERAL
PAINLEVEL_OUTOF10: 7
PAINLEVEL_OUTOF10: 7
PAINLEVEL_OUTOF10: 0 - NO PAIN
PAINLEVEL_OUTOF10: 5 - MODERATE PAIN

## 2025-04-14 ASSESSMENT — PAIN DESCRIPTION - ORIENTATION: ORIENTATION: LEFT

## 2025-04-14 ASSESSMENT — PAIN - FUNCTIONAL ASSESSMENT: PAIN_FUNCTIONAL_ASSESSMENT: 0-10

## 2025-04-14 NOTE — Clinical Note
Radha Corley was seen and treated in our emergency department on 4/14/2025.  She may return to work on 04/15/2025.       If you have any questions or concerns, please don't hesitate to call.      Edgardo Rapp, DO

## 2025-04-14 NOTE — ED PROVIDER NOTES
HPI   Chief Complaint   Patient presents with    Flank Pain     Patient reports b/l flank pain with radiation to L side abdomen. Patient also c/o nausea. Denies urinary difficulty, V/D/fever.        Patient presents to the emergency department secondary to back pain.  She states that her symptoms began this morning with right-sided flank pain.  The pain is now migrated to the left.  She reports associated nausea and urgency.  History of kidney stones and concern for the same      History provided by:  Patient   used: No            Patient History   Past Medical History:   Diagnosis Date    ADHD (attention deficit hyperactivity disorder)     Bipolar 1 disorder (Multi)     Calculus of kidney 2021    Kidney stone on left side    Encounter for  delivery without indication ([facility])     Delivery of pregnancy by  section    Encounter for gynecological examination (general) (routine) without abnormal findings     Pap test, as part of routine gynecological examination    Low iron     Other conditions influencing health status     Menstruation    Pain in unspecified hip 2021    Hip pain, acute    Personal history of other complications of pregnancy, childbirth and the puerperium     History of miscarriage    Personal history of other diseases of urinary system 12/10/2020    History of hydronephrosis    Personal history of other specified conditions 12/10/2020    History of flank pain    Personal history of other specified conditions 2021    History of fatigue     Past Surgical History:   Procedure Laterality Date     SECTION, LOW TRANSVERSE      CYSTOSCOPY INSERTION / REMOVAL STENT / STONE      EXPLORATORY LAPAROTOMY      OTHER SURGICAL HISTORY  2021    Cyst excision    TUBAL LIGATION Bilateral      Family History   Problem Relation Name Age of Onset    Depression Mother      Hypertension Mother      Hyperlipidemia Mother      Stroke Mother      Heart  attack Mother      Anxiety disorder Mother      Fibromyalgia Mother      Parkinsonism Father      Heart disease Father      Coronary artery disease Father       Social History     Tobacco Use    Smoking status: Some Days     Current packs/day: 0.50     Types: Cigarettes    Smokeless tobacco: Never   Vaping Use    Vaping status: Never Used   Substance Use Topics    Alcohol use: Not Currently    Drug use: Yes     Frequency: 2.0 times per week     Types: Marijuana       Physical Exam   ED Triage Vitals [04/14/25 1627]   Temperature Heart Rate Respirations BP   36.9 °C (98.5 °F) 83 18 132/81      Pulse Ox Temp Source Heart Rate Source Patient Position   99 % Temporal -- --      BP Location FiO2 (%)     -- --       Physical Exam  Vitals and nursing note reviewed.   Constitutional:       General: She is not in acute distress.     Appearance: Normal appearance. She is obese. She is not ill-appearing, toxic-appearing or diaphoretic.      Comments: Resting comfortably.  Not displaying any renal colic   HENT:      Head: Normocephalic and atraumatic.      Nose: Nose normal. No rhinorrhea.   Neck:      Comments: Trachea is midline  Cardiovascular:      Rate and Rhythm: Normal rate and regular rhythm.      Heart sounds: No murmur heard.  Pulmonary:      Effort: Pulmonary effort is normal.      Breath sounds: Normal breath sounds. No wheezing.   Abdominal:      General: Abdomen is flat. Bowel sounds are normal. There is no distension.      Palpations: Abdomen is soft.      Tenderness: There is no abdominal tenderness. There is right CVA tenderness and left CVA tenderness.   Musculoskeletal:         General: Normal range of motion.      Cervical back: Normal range of motion.   Skin:     General: Skin is warm and dry.      Findings: No rash.   Neurological:      General: No focal deficit present.      Mental Status: She is alert and oriented to person, place, and time. Mental status is at baseline.   Psychiatric:         Mood and  Affect: Mood normal.         Behavior: Behavior normal.         Thought Content: Thought content normal.         Judgment: Judgment normal.           ED Course & MDM   Diagnoses as of 04/14/25 1849   Bilateral low back pain without sciatica, unspecified chronicity   Umbilical hernia without obstruction and without gangrene                 No data recorded     [place] Coma Scale Score: 15 (04/14/25 1628 : Griselda Gregorio RN)                           Medical Decision Making  CAT scan shows the incidental findings of pulmonary atelectasis as well as a tiny umbilical hernia.  These findings were disclosed to the patient and she was given a copy of her CAT scan report.  In regards to the presenting symptoms differential considerations would include, but not limited to, lumbar sprain versus strain.  Patient will be discharged.  Given a work note.  Recommended Tylenol or Motrin for pain.  Follow-up with her private physician.  Return if worse        Procedure  Procedures     Edgardo Rapp, DO  04/14/25 1849

## 2025-04-15 LAB — HOLD SPECIMEN: NORMAL

## 2025-04-17 ENCOUNTER — TELEPHONE (OUTPATIENT)
Age: 35
End: 2025-04-17
Payer: COMMERCIAL

## 2025-04-17 DIAGNOSIS — I10 PRIMARY HYPERTENSION: ICD-10-CM

## 2025-04-17 RX ORDER — SPIRONOLACTONE 100 MG/1
100 TABLET, FILM COATED ORAL DAILY
Qty: 30 TABLET | Refills: 1 | Status: SHIPPED | OUTPATIENT
Start: 2025-04-17

## 2025-04-17 NOTE — TELEPHONE ENCOUNTER
PATIENT CALLED WOULD  SPIROLACTONE 50 MG INCREASED.    ANKLES AND TOPS OF FEET SWELLING.     HAS APPT NEXT THURSDAY, BUT OUT OF MED NOW.    [facility]

## 2025-04-24 ENCOUNTER — APPOINTMENT (OUTPATIENT)
Age: 35
End: 2025-04-24
Payer: COMMERCIAL

## 2025-05-05 ENCOUNTER — APPOINTMENT (OUTPATIENT)
Age: 35
End: 2025-05-05
Payer: COMMERCIAL

## 2025-05-05 VITALS
BODY MASS INDEX: 39.57 KG/M2 | HEART RATE: 72 BPM | OXYGEN SATURATION: 97 % | WEIGHT: 231.8 LBS | SYSTOLIC BLOOD PRESSURE: 132 MMHG | DIASTOLIC BLOOD PRESSURE: 84 MMHG | HEIGHT: 64 IN

## 2025-05-05 DIAGNOSIS — R60.9 EDEMA, UNSPECIFIED TYPE: Primary | ICD-10-CM

## 2025-05-05 DIAGNOSIS — N20.0 KIDNEY STONE: ICD-10-CM

## 2025-05-05 DIAGNOSIS — R22.0 LUMP OF SCALP: ICD-10-CM

## 2025-05-05 DIAGNOSIS — F17.200 SMOKER: ICD-10-CM

## 2025-05-05 PROCEDURE — 99214 OFFICE O/P EST MOD 30 MIN: CPT | Performed by: FAMILY MEDICINE

## 2025-05-05 PROCEDURE — 3079F DIAST BP 80-89 MM HG: CPT | Performed by: FAMILY MEDICINE

## 2025-05-05 PROCEDURE — 3008F BODY MASS INDEX DOCD: CPT | Performed by: FAMILY MEDICINE

## 2025-05-05 PROCEDURE — 3075F SYST BP GE 130 - 139MM HG: CPT | Performed by: FAMILY MEDICINE

## 2025-05-05 RX ORDER — HYDROCHLOROTHIAZIDE 25 MG/1
25 TABLET ORAL DAILY
Qty: 90 TABLET | Refills: 1 | Status: SHIPPED | OUTPATIENT
Start: 2025-05-05

## 2025-05-05 RX ORDER — VARENICLINE TARTRATE 0.5 (11)-1
KIT ORAL
Qty: 53 EACH | Refills: 0 | Status: SHIPPED | OUTPATIENT
Start: 2025-05-05

## 2025-05-05 NOTE — PROGRESS NOTES
Subjective   Radha Corley is a 34 y.o. female who presents for Follow-up (CT results).  Here for follow up recent ER visit/CT report.  She states that she was told there was something weird on her CT - was told that the branches of her lungs were collapsed.  She then ended up in the ER and she was told that she had pneumonia and was treated with levaquin, prednisone.  She is slowly getting better, but is concerned that her smoking is causing problems.  She would like to try to quit now and is interested in trying chantix.      She is also interested in switching to a different water pill as she is no longer on lithium and the spironolactone doesn't seem to be working well for her.      She also has recurrence of some lumps/cysts on her scalp - she has seen Dr Rose in the past and has had some removed and she would like to see her again.              Objective   Visit Vitals  /84   Pulse 72      Physical Exam  Vitals reviewed.   Constitutional:       General: She is not in acute distress.  Cardiovascular:      Rate and Rhythm: Normal rate and regular rhythm.      Heart sounds: No murmur heard.  Pulmonary:      Effort: Pulmonary effort is normal. No respiratory distress.      Breath sounds: Normal breath sounds.   Skin:     General: Skin is warm and dry.   Neurological:      General: No focal deficit present.      Mental Status: She is alert. Mental status is at baseline.         Assessment/Plan   Problem List Items Addressed This Visit       Kidney stone    Relevant Medications    hydroCHLOROthiazide (HYDRODiuril) 25 mg tablet    Other Relevant Orders    Follow Up In Primary Care - Established     Other Visit Diagnoses         Edema, unspecified type    -  Primary    Relevant Medications    hydroCHLOROthiazide (HYDRODiuril) 25 mg tablet    Other Relevant Orders    Comprehensive Metabolic Panel    Follow Up In Primary Care - Established      Smoker        Relevant Medications    varenicline tartrate  (Chantix Starting Month Box) 0.5 mg (11)- 1 mg (42) tablet    Other Relevant Orders    Follow Up In Primary Care - Established      Lump of scalp        Relevant Orders    Referral to General Surgery    Follow Up In Primary Care - Established               Kerrie Fall MD

## 2025-05-05 NOTE — PROGRESS NOTES
"Subjective   Patient ID: Radha Corley is a 34 y.o. female who presents for Follow-up (CT results).    HPI     Review of Systems    Objective   /84   Pulse 72   Ht 1.626 m (5' 4\")   Wt 105 kg (231 lb 12.8 oz)   SpO2 97%   BMI 39.79 kg/m²     Physical Exam    Assessment/Plan          "

## 2025-05-05 NOTE — PATIENT INSTRUCTIONS
We will switch from spironolactone to hydrochlorothiazide now that she is not on lithium and that may help with her kidney stone issues as well as swelling.  Did caution her that it may lower her potassium and we will need to get a cmp in 1-2 weeks.  Will refer to Dr Rose for the cysts on her scalp.  Will start chantix to help with smoking cessation.  Follow up in 3-4 weeks, sooner if needed.

## 2025-06-03 ENCOUNTER — APPOINTMENT (OUTPATIENT)
Age: 35
End: 2025-06-03
Payer: COMMERCIAL

## 2025-06-03 VITALS
BODY MASS INDEX: 39.99 KG/M2 | OXYGEN SATURATION: 96 % | HEART RATE: 90 BPM | DIASTOLIC BLOOD PRESSURE: 88 MMHG | SYSTOLIC BLOOD PRESSURE: 122 MMHG | WEIGHT: 233 LBS

## 2025-06-03 DIAGNOSIS — F31.9 BIPOLAR AFFECTIVE DISORDER, REMISSION STATUS UNSPECIFIED (MULTI): ICD-10-CM

## 2025-06-03 DIAGNOSIS — I10 PRIMARY HYPERTENSION: ICD-10-CM

## 2025-06-03 PROCEDURE — 3074F SYST BP LT 130 MM HG: CPT | Performed by: FAMILY MEDICINE

## 2025-06-03 PROCEDURE — 3079F DIAST BP 80-89 MM HG: CPT | Performed by: FAMILY MEDICINE

## 2025-06-03 PROCEDURE — 99213 OFFICE O/P EST LOW 20 MIN: CPT | Performed by: FAMILY MEDICINE

## 2025-06-03 RX ORDER — METOPROLOL SUCCINATE 50 MG/1
50 TABLET, EXTENDED RELEASE ORAL 2 TIMES DAILY
Qty: 180 TABLET | Refills: 1 | Status: SHIPPED | OUTPATIENT
Start: 2025-06-03

## 2025-06-03 NOTE — PROGRESS NOTES
Subjective   Patient ID: Radha Corley is a 34 y.o. female who presents for Follow-up (4 week ).  HPI    Review of Systems    Objective   Physical Exam    Assessment/Plan            Ofelia Parnell MA 06/03/25 3:52 PM

## 2025-06-03 NOTE — PATIENT INSTRUCTIONS
Continue current medications.  Get labs soon.  Also discussed support stockings.  Follow up in 3 months, sooner if needed.

## 2025-06-03 NOTE — PROGRESS NOTES
Subjective   Radha Corley is a 34 y.o. female who presents for Follow-up (4 week ).  Here for follow up edema.  She states that she is still having some swelling in her ankles.  She is needing a refill of her metoprolol.  She has not gotten her labs yet.     She states that she never tried the chantix as she was concerned about potential side effects.              Objective   Visit Vitals  /88   Pulse 90      Physical Exam  Vitals reviewed.   Constitutional:       General: She is not in acute distress.  Cardiovascular:      Rate and Rhythm: Normal rate and regular rhythm.      Heart sounds: No murmur heard.  Pulmonary:      Effort: Pulmonary effort is normal. No respiratory distress.      Breath sounds: Normal breath sounds.   Skin:     General: Skin is warm and dry.   Neurological:      General: No focal deficit present.      Mental Status: She is alert. Mental status is at baseline.         Assessment/Plan   Problem List Items Addressed This Visit       Primary hypertension    Relevant Medications    metoprolol succinate XL (Toprol-XL) 50 mg 24 hr tablet     Other Visit Diagnoses         Bipolar affective disorder, remission status unspecified (Multi)                   Kerrie Fall MD

## 2025-06-10 ENCOUNTER — APPOINTMENT (OUTPATIENT)
Dept: SURGERY | Facility: CLINIC | Age: 35
End: 2025-06-10
Payer: COMMERCIAL

## 2025-06-10 ENCOUNTER — DOCUMENTATION (OUTPATIENT)
Dept: SURGERY | Facility: CLINIC | Age: 35
End: 2025-06-10

## 2025-06-10 VITALS
DIASTOLIC BLOOD PRESSURE: 70 MMHG | HEIGHT: 64 IN | SYSTOLIC BLOOD PRESSURE: 116 MMHG | BODY MASS INDEX: 40.26 KG/M2 | HEART RATE: 90 BPM | WEIGHT: 235.8 LBS

## 2025-06-10 DIAGNOSIS — R22.0 LUMP OF SCALP: Primary | ICD-10-CM

## 2025-06-10 PROCEDURE — 3078F DIAST BP <80 MM HG: CPT | Performed by: SURGERY

## 2025-06-10 PROCEDURE — 99204 OFFICE O/P NEW MOD 45 MIN: CPT | Performed by: SURGERY

## 2025-06-10 PROCEDURE — 3008F BODY MASS INDEX DOCD: CPT | Performed by: SURGERY

## 2025-06-10 PROCEDURE — 3074F SYST BP LT 130 MM HG: CPT | Performed by: SURGERY

## 2025-06-10 RX ORDER — CEFAZOLIN SODIUM 2 G/100ML
2 INJECTION, SOLUTION INTRAVENOUS ONCE
OUTPATIENT
Start: 2025-06-10 | End: 2025-06-10

## 2025-06-10 RX ORDER — SODIUM CHLORIDE, SODIUM LACTATE, POTASSIUM CHLORIDE, CALCIUM CHLORIDE 600; 310; 30; 20 MG/100ML; MG/100ML; MG/100ML; MG/100ML
50 INJECTION, SOLUTION INTRAVENOUS CONTINUOUS
OUTPATIENT
Start: 2025-06-10 | End: 2025-06-11

## 2025-06-10 NOTE — LETTER
Madie 10, 2025     Kerrie Fall MD  [address] [address]    Patient: Radha Corley   YOB: 1990   Date of Visit: 6/10/2025       Dear Dr. Kerrie Fall MD:    Thank you for referring Radha Corley to me for evaluation. Below are my notes for this consultation.  If you have questions, please do not hesitate to call me. I look forward to following your patient along with you.       Sincerely,     Cherise Rose MD      CC: No Recipients  ______________________________________________________________________________________    General Surgery Consultation    Patient: Radha Corley  : 1990  MRN: [id number]  Date of Consultation: 06/10/25    Primary Care Provider: Kerrie Fall MD  Referring Provider: Kerrie Fall,*    Chief Complaint: Scalp cyst    History of Present Illness: Radha Corley is a 34 y.o. old female seen at the request of Kerrie Fall,* for evaluation of scalp cyst.  Patient states she has a very small 1 on the right side but does not really bother her.  On the left parietal region she has 2, 1 which seems to be near the previous incision that is oblong in shape.  She has 1 in the posterior scalp to the left that is getting larger and is bothering her.  She would like those removed.    Medical History:  Medical History[1]    Surgical History:  Surgical History[2]    Home Medications:  Prior to Admission medications    Medication Sig Start Date End Date Taking? Authorizing Provider   amphetamine-dextroamphetamine (Adderall) 10 mg tablet TAKE 1 TABLET BY MOUTH DAILY AT 1:00PM 24  Yes Historical Provider, MD   cholecalciferol (Vitamin D-3) 5,000 Units tablet Take 1 tablet (5,000 Units) by mouth once daily. 23  Yes Historical Provider, MD   cloNIDine (Catapres) 0.1 mg tablet Take 1 tablet (0.1 mg) by mouth 2 times a day.   Yes Historical Provider, MD   hydroCHLOROthiazide  "(HYDRODiuril) 25 mg tablet Take 1 tablet (25 mg) by mouth once daily. 5/5/25  Yes Kerrie Fall MD   lisdexamfetamine (Vyvanse) 50 mg capsule Take 1 capsule (50 mg) by mouth once daily in the morning. Take before meals. 2/22/24  Yes Historical Provider, MD   Lybalvi 10-10 mg tablet Take 1 tablet by mouth once daily. 4/18/24  Yes Historical Provider, MD   PARoxetine (Paxil) 40 mg tablet Take 1 tablet (40 mg) by mouth once daily in the morning.   Yes Historical Provider, MD   pyridoxine (Vitamin B-6) 100 mg tablet Take 1 tablet (100 mg) by mouth once daily.   Yes Historical Provider, MD   albuterol (Ventolin HFA) 90 mcg/actuation inhaler Inhale 2 puffs every 4 hours if needed for wheezing or shortness of breath. 2/23/24 2/22/25  Kerrie Fall MD   clonazePAM (KlonoPIN) 0.5 mg tablet Take 1 tablet (0.5 mg) by mouth 2 times a day as needed.  Patient not taking: Reported on 6/10/2025 4/3/24   Historical Provider, MD   gabapentin (Neurontin) 300 mg capsule Take 1 capsule (300 mg) by mouth 2 times a day.  Patient not taking: Reported on 6/10/2025    Historical Provider, MD   metoprolol succinate XL (Toprol-XL) 50 mg 24 hr tablet Take 1 tablet (50 mg) by mouth 2 times a day. DO NOT CRUSH OR CHEW  Patient not taking: Reported on 6/10/2025 6/3/25   Kerrie Fall MD       Allergies:  Allergies[3]  is allergic to keflex [cephalexin] and augmentin [amoxicillin-pot clavulanate].    Family History:   Family History[4]    Social History:  Social History[5]    ROS:  Constitutional:  no fever, sweats, and chills  Cardiovascular: No chest pain  Respiratory: No cough or shortness of breath  Gastrointestinal: Denies  Genitourinary: no dysuria  Musculoskeletal: no weakness or swelling  Integumentary: no rashes  Neurological: no confusion  Endocrine: no heat or cold intolerance  Heme/Lymph: no easy bruising or bleeding    Objective:  /70   Pulse 90   Ht 1.626 m (5' 4\")   Wt 107 kg (235 lb 12.8 oz)   " BMI 40.47 kg/m²     Physical Exam:  Constitutional: No acute distress, conversant, pleasant  Neurologic: alert and oriented  Psych: appropriate affect  Ears, Nose, Mouth and Throat: mucus membranes moist  Pulmonary: No labored breathing  Cardiovascular: Regular rate and rhythm  Abdomen: soft, non-distended, non-tender  Musculoskeletal: Moves all extremities, no edema  Skin: warm and dry.  She has 2 scalp cysts on the left 1 measuring about a centimeter and the other about 17 mm and is oblong.  On her posterior scalp it is maybe 18 mm.    Assessment and Plan: Radha Corley is a 34 y.o. old female with multiple scalp cyst for excision.  I explained to her the 1 on the right side is actually probably so small and we put the local and will be hard to localize.  She is okay with removing the other 3.  We discussed the procedure risks and potential complications.  She asked us to not shave her hair.  She understands this increases the infectious risk but due to the blood supply of the scalp it is unlikely this would happen.  She also understands with the amount of bleeding from the scalp I typically closed these with staples and glue.  All questions were answered and she asked us to proceed.  On reviewing her family history she lists history of rectal cancer in her grandfather and she thinks he was in his 50s.  She then mentions some other families that had colon cancer.  Her maternal great aunt had breast cancer and an aunt also had breast cancer.  She will check on their ages of the incidence to help determine when her screening should start..     Cherise Rose MD  6/10/2025       [1]  Past Medical History:  Diagnosis Date   • ADHD (attention deficit hyperactivity disorder)    • Anxiety and depression    • Bipolar 1 disorder (Multi)    • Calculus of kidney 2021    Kidney stone on left side   • Encounter for  delivery without indication ([facility])     Delivery of pregnancy by  section   •  Encounter for gynecological examination (general) (routine) without abnormal findings     Pap test, as part of routine gynecological examination   • Hypertension    • Low iron    • Other conditions influencing health status     Menstruation   • Pain in unspecified hip 2021    Hip pain, acute   • Personal history of other complications of pregnancy, childbirth and the puerperium     History of miscarriage   • Personal history of other diseases of urinary system 12/10/2020    History of hydronephrosis   • Personal history of other specified conditions 12/10/2020    History of flank pain   • Personal history of other specified conditions 2021    History of fatigue   [2]  Past Surgical History:  Procedure Laterality Date   •  SECTION, LOW TRANSVERSE     • CYST REMOVAL  2017    EXCISION SCALP CYST/ DR ACUÑA   • CYSTOSCOPY INSERTION / REMOVAL STENT / STONE     • EXPLORATORY LAPAROTOMY     • TUBAL LIGATION Bilateral    [3]  Allergies  Allergen Reactions   • Keflex [Cephalexin] Nausea/vomiting   • Augmentin [Amoxicillin-Pot Clavulanate] Rash   [4]  Family History  Problem Relation Name Age of Onset   • Depression Mother     • Hypertension Mother     • Hyperlipidemia Mother     • Stroke Mother     • Heart attack Mother     • Anxiety disorder Mother     • Fibromyalgia Mother     • Thyroid cancer Mother     • Parkinsonism Father     • Heart disease Father     • Coronary artery disease Father     • Diabetes Maternal Grandmother     • Stroke Paternal Grandmother     • Rectal cancer Paternal Grandfather     • Colon cancer Paternal Grandfather     [5]  Social History  Socioeconomic History   • Marital status: Single   Tobacco Use   • Smoking status: Some Days     Current packs/day: 0.50     Types: Cigarettes   • Smokeless tobacco: Never   Vaping Use   • Vaping status: Never Used   Substance and Sexual Activity   • Alcohol use: Not Currently   • Drug use: Yes     Frequency: 2.0 times per week      Types: Marijuana     Comment: rarely   • Sexual activity: Defer     Birth control/protection: Female Sterilization

## 2025-06-10 NOTE — H&P (VIEW-ONLY)
General Surgery Consultation    Patient: Radha Corley  : 1990  MRN: [id number]  Date of Consultation: 06/10/25    Primary Care Provider: Kerrie Fall MD  Referring Provider: Kerrie Fall,*    Chief Complaint: Scalp cyst    History of Present Illness: Radha Corley is a 34 y.o. old female seen at the request of Kerrie Fall,* for evaluation of scalp cyst.  Patient states she has a very small 1 on the right side but does not really bother her.  On the left parietal region she has 2, 1 which seems to be near the previous incision that is oblong in shape.  She has 1 in the posterior scalp to the left that is getting larger and is bothering her.  She would like those removed.    Medical History:  Medical History[1]    Surgical History:  Surgical History[2]    Home Medications:  Prior to Admission medications    Medication Sig Start Date End Date Taking? Authorizing Provider   amphetamine-dextroamphetamine (Adderall) 10 mg tablet TAKE 1 TABLET BY MOUTH DAILY AT 1:00PM 24  Yes Historical Provider, MD   cholecalciferol (Vitamin D-3) 5,000 Units tablet Take 1 tablet (5,000 Units) by mouth once daily. 23  Yes Historical Provider, MD   cloNIDine (Catapres) 0.1 mg tablet Take 1 tablet (0.1 mg) by mouth 2 times a day.   Yes Historical Provider, MD   hydroCHLOROthiazide (HYDRODiuril) 25 mg tablet Take 1 tablet (25 mg) by mouth once daily. 25  Yes Kerrie Fall MD   lisdexamfetamine (Vyvanse) 50 mg capsule Take 1 capsule (50 mg) by mouth once daily in the morning. Take before meals. 24  Yes Historical Provider, MD   Lybalvi 10-10 mg tablet Take 1 tablet by mouth once daily. 24  Yes Historical Provider, MD   PARoxetine (Paxil) 40 mg tablet Take 1 tablet (40 mg) by mouth once daily in the morning.   Yes Historical Provider, MD   pyridoxine (Vitamin B-6) 100 mg tablet Take 1 tablet (100 mg) by mouth once daily.   Yes Historical Provider, MD  "  albuterol (Ventolin HFA) 90 mcg/actuation inhaler Inhale 2 puffs every 4 hours if needed for wheezing or shortness of breath. 2/23/24 2/22/25  Kerrie Fall MD   clonazePAM (KlonoPIN) 0.5 mg tablet Take 1 tablet (0.5 mg) by mouth 2 times a day as needed.  Patient not taking: Reported on 6/10/2025 4/3/24   Historical Provider, MD   gabapentin (Neurontin) 300 mg capsule Take 1 capsule (300 mg) by mouth 2 times a day.  Patient not taking: Reported on 6/10/2025    Historical Provider, MD   metoprolol succinate XL (Toprol-XL) 50 mg 24 hr tablet Take 1 tablet (50 mg) by mouth 2 times a day. DO NOT CRUSH OR CHEW  Patient not taking: Reported on 6/10/2025 6/3/25   Kerrie Fall MD       Allergies:  Allergies[3]  is allergic to keflex [cephalexin] and augmentin [amoxicillin-pot clavulanate].    Family History:   Family History[4]    Social History:  Social History[5]    ROS:  Constitutional:  no fever, sweats, and chills  Cardiovascular: No chest pain  Respiratory: No cough or shortness of breath  Gastrointestinal: Denies  Genitourinary: no dysuria  Musculoskeletal: no weakness or swelling  Integumentary: no rashes  Neurological: no confusion  Endocrine: no heat or cold intolerance  Heme/Lymph: no easy bruising or bleeding    Objective:  /70   Pulse 90   Ht 1.626 m (5' 4\")   Wt 107 kg (235 lb 12.8 oz)   BMI 40.47 kg/m²     Physical Exam:  Constitutional: No acute distress, conversant, pleasant  Neurologic: alert and oriented  Psych: appropriate affect  Ears, Nose, Mouth and Throat: mucus membranes moist  Pulmonary: No labored breathing  Cardiovascular: Regular rate and rhythm  Abdomen: soft, non-distended, non-tender  Musculoskeletal: Moves all extremities, no edema  Skin: warm and dry.  She has 2 scalp cysts on the left 1 measuring about a centimeter and the other about 17 mm and is oblong.  On her posterior scalp it is maybe 18 mm.    Assessment and Plan: Radha Corley is a 34 y.o. old " female with multiple scalp cyst for excision.  I explained to her the 1 on the right side is actually probably so small and we put the local and will be hard to localize.  She is okay with removing the other 3.  We discussed the procedure risks and potential complications.  She asked us to not shave her hair.  She understands this increases the infectious risk but due to the blood supply of the scalp it is unlikely this would happen.  She also understands with the amount of bleeding from the scalp I typically closed these with staples and glue.  All questions were answered and she asked us to proceed.  On reviewing her family history she lists history of rectal cancer in her grandfather and she thinks he was in his 50s.  She then mentions some other families that had colon cancer.  Her maternal great aunt had breast cancer and an aunt also had breast cancer.  She will check on their ages of the incidence to help determine when her screening should start..     Cherise Rose MD  6/10/2025       [1]   Past Medical History:  Diagnosis Date    ADHD (attention deficit hyperactivity disorder)     Anxiety and depression     Bipolar 1 disorder (Multi)     Calculus of kidney 2021    Kidney stone on left side    Encounter for  delivery without indication ([facility])     Delivery of pregnancy by  section    Encounter for gynecological examination (general) (routine) without abnormal findings     Pap test, as part of routine gynecological examination    Hypertension     Low iron     Other conditions influencing health status     Menstruation    Pain in unspecified hip 2021    Hip pain, acute    Personal history of other complications of pregnancy, childbirth and the puerperium     History of miscarriage    Personal history of other diseases of urinary system 12/10/2020    History of hydronephrosis    Personal history of other specified conditions 12/10/2020    History of flank pain    Personal  history of other specified conditions 2021    History of fatigue   [2]   Past Surgical History:  Procedure Laterality Date     SECTION, LOW TRANSVERSE      CYST REMOVAL  2017    EXCISION SCALP CYST/ DR ACUÑA    CYSTOSCOPY INSERTION / REMOVAL STENT / STONE      EXPLORATORY LAPAROTOMY      TUBAL LIGATION Bilateral    [3]   Allergies  Allergen Reactions    Keflex [Cephalexin] Nausea/vomiting    Augmentin [Amoxicillin-Pot Clavulanate] Rash   [4]   Family History  Problem Relation Name Age of Onset    Depression Mother      Hypertension Mother      Hyperlipidemia Mother      Stroke Mother      Heart attack Mother      Anxiety disorder Mother      Fibromyalgia Mother      Thyroid cancer Mother      Parkinsonism Father      Heart disease Father      Coronary artery disease Father      Diabetes Maternal Grandmother      Stroke Paternal Grandmother      Rectal cancer Paternal Grandfather      Colon cancer Paternal Grandfather     [5]   Social History  Socioeconomic History    Marital status: Single   Tobacco Use    Smoking status: Some Days     Current packs/day: 0.50     Types: Cigarettes    Smokeless tobacco: Never   Vaping Use    Vaping status: Never Used   Substance and Sexual Activity    Alcohol use: Not Currently    Drug use: Yes     Frequency: 2.0 times per week     Types: Marijuana     Comment: rarely    Sexual activity: Defer     Birth control/protection: Female Sterilization

## 2025-06-10 NOTE — PROGRESS NOTES
Notified patient of  scalp cyst removal  scheduled on 7-3-25.  Instructions reviewed. Advised patient [facility] will contact them 24 hours before surgery with arrival time. Pt voices understanding. Margarita Hutchins MA.

## 2025-06-10 NOTE — PROGRESS NOTES
General Surgery Consultation    Patient: Radha Corley  : 1990  MRN: [id number]  Date of Consultation: 06/10/25    Primary Care Provider: Kerrie Fall MD  Referring Provider: Kerrie Fall,*    Chief Complaint: Scalp cyst    History of Present Illness: Radha Corley is a 34 y.o. old female seen at the request of Kerrie Fall,* for evaluation of scalp cyst.  Patient states she has a very small 1 on the right side but does not really bother her.  On the left parietal region she has 2, 1 which seems to be near the previous incision that is oblong in shape.  She has 1 in the posterior scalp to the left that is getting larger and is bothering her.  She would like those removed.    Medical History:  Medical History[1]    Surgical History:  Surgical History[2]    Home Medications:  Prior to Admission medications    Medication Sig Start Date End Date Taking? Authorizing Provider   amphetamine-dextroamphetamine (Adderall) 10 mg tablet TAKE 1 TABLET BY MOUTH DAILY AT 1:00PM 24  Yes Historical Provider, MD   cholecalciferol (Vitamin D-3) 5,000 Units tablet Take 1 tablet (5,000 Units) by mouth once daily. 23  Yes Historical Provider, MD   cloNIDine (Catapres) 0.1 mg tablet Take 1 tablet (0.1 mg) by mouth 2 times a day.   Yes Historical Provider, MD   hydroCHLOROthiazide (HYDRODiuril) 25 mg tablet Take 1 tablet (25 mg) by mouth once daily. 25  Yes Kerrie Fall MD   lisdexamfetamine (Vyvanse) 50 mg capsule Take 1 capsule (50 mg) by mouth once daily in the morning. Take before meals. 24  Yes Historical Provider, MD   Lybalvi 10-10 mg tablet Take 1 tablet by mouth once daily. 24  Yes Historical Provider, MD   PARoxetine (Paxil) 40 mg tablet Take 1 tablet (40 mg) by mouth once daily in the morning.   Yes Historical Provider, MD   pyridoxine (Vitamin B-6) 100 mg tablet Take 1 tablet (100 mg) by mouth once daily.   Yes Historical Provider, MD  "  albuterol (Ventolin HFA) 90 mcg/actuation inhaler Inhale 2 puffs every 4 hours if needed for wheezing or shortness of breath. 2/23/24 2/22/25  Kerrie Fall MD   clonazePAM (KlonoPIN) 0.5 mg tablet Take 1 tablet (0.5 mg) by mouth 2 times a day as needed.  Patient not taking: Reported on 6/10/2025 4/3/24   Historical Provider, MD   gabapentin (Neurontin) 300 mg capsule Take 1 capsule (300 mg) by mouth 2 times a day.  Patient not taking: Reported on 6/10/2025    Historical Provider, MD   metoprolol succinate XL (Toprol-XL) 50 mg 24 hr tablet Take 1 tablet (50 mg) by mouth 2 times a day. DO NOT CRUSH OR CHEW  Patient not taking: Reported on 6/10/2025 6/3/25   Kerrie Fall MD       Allergies:  Allergies[3]  is allergic to keflex [cephalexin] and augmentin [amoxicillin-pot clavulanate].    Family History:   Family History[4]    Social History:  Social History[5]    ROS:  Constitutional:  no fever, sweats, and chills  Cardiovascular: No chest pain  Respiratory: No cough or shortness of breath  Gastrointestinal: Denies  Genitourinary: no dysuria  Musculoskeletal: no weakness or swelling  Integumentary: no rashes  Neurological: no confusion  Endocrine: no heat or cold intolerance  Heme/Lymph: no easy bruising or bleeding    Objective:  /70   Pulse 90   Ht 1.626 m (5' 4\")   Wt 107 kg (235 lb 12.8 oz)   BMI 40.47 kg/m²     Physical Exam:  Constitutional: No acute distress, conversant, pleasant  Neurologic: alert and oriented  Psych: appropriate affect  Ears, Nose, Mouth and Throat: mucus membranes moist  Pulmonary: No labored breathing  Cardiovascular: Regular rate and rhythm  Abdomen: soft, non-distended, non-tender  Musculoskeletal: Moves all extremities, no edema  Skin: warm and dry.  She has 2 scalp cysts on the left 1 measuring about a centimeter and the other about 17 mm and is oblong.  On her posterior scalp it is maybe 18 mm.    Assessment and Plan: Radha Corley is a 34 y.o. old " female with multiple scalp cyst for excision.  I explained to her the 1 on the right side is actually probably so small and we put the local and will be hard to localize.  She is okay with removing the other 3.  We discussed the procedure risks and potential complications.  She asked us to not shave her hair.  She understands this increases the infectious risk but due to the blood supply of the scalp it is unlikely this would happen.  She also understands with the amount of bleeding from the scalp I typically closed these with staples and glue.  All questions were answered and she asked us to proceed.  On reviewing her family history she lists history of rectal cancer in her grandfather and she thinks he was in his 50s.  She then mentions some other families that had colon cancer.  Her maternal great aunt had breast cancer and an aunt also had breast cancer.  She will check on their ages of the incidence to help determine when her screening should start..     Cherise Rose MD  6/10/2025       [1]   Past Medical History:  Diagnosis Date    ADHD (attention deficit hyperactivity disorder)     Anxiety and depression     Bipolar 1 disorder (Multi)     Calculus of kidney 2021    Kidney stone on left side    Encounter for  delivery without indication ([facility])     Delivery of pregnancy by  section    Encounter for gynecological examination (general) (routine) without abnormal findings     Pap test, as part of routine gynecological examination    Hypertension     Low iron     Other conditions influencing health status     Menstruation    Pain in unspecified hip 2021    Hip pain, acute    Personal history of other complications of pregnancy, childbirth and the puerperium     History of miscarriage    Personal history of other diseases of urinary system 12/10/2020    History of hydronephrosis    Personal history of other specified conditions 12/10/2020    History of flank pain    Personal  history of other specified conditions 2021    History of fatigue   [2]   Past Surgical History:  Procedure Laterality Date     SECTION, LOW TRANSVERSE      CYST REMOVAL  2017    EXCISION SCALP CYST/ DR ACUÑA    CYSTOSCOPY INSERTION / REMOVAL STENT / STONE      EXPLORATORY LAPAROTOMY      TUBAL LIGATION Bilateral    [3]   Allergies  Allergen Reactions    Keflex [Cephalexin] Nausea/vomiting    Augmentin [Amoxicillin-Pot Clavulanate] Rash   [4]   Family History  Problem Relation Name Age of Onset    Depression Mother      Hypertension Mother      Hyperlipidemia Mother      Stroke Mother      Heart attack Mother      Anxiety disorder Mother      Fibromyalgia Mother      Thyroid cancer Mother      Parkinsonism Father      Heart disease Father      Coronary artery disease Father      Diabetes Maternal Grandmother      Stroke Paternal Grandmother      Rectal cancer Paternal Grandfather      Colon cancer Paternal Grandfather     [5]   Social History  Socioeconomic History    Marital status: Single   Tobacco Use    Smoking status: Some Days     Current packs/day: 0.50     Types: Cigarettes    Smokeless tobacco: Never   Vaping Use    Vaping status: Never Used   Substance and Sexual Activity    Alcohol use: Not Currently    Drug use: Yes     Frequency: 2.0 times per week     Types: Marijuana     Comment: rarely    Sexual activity: Defer     Birth control/protection: Female Sterilization

## 2025-06-26 NOTE — PREPROCEDURE INSTRUCTIONS
Current Rx Instructions for Day of Surgery[1]                    NPO Instructions:    Do not eat any food after midnight the night before your surgery/procedure.  You may have clear liquids until TWO hours before surgery/procedure. This includes water, black tea/coffee, (no milk or cream) apple juice and electrolyte drinks (Gatorade).    Additional Instructions:     Will need  home, will receive call day before surgery with arrival time                                                     [1]   No outpatient medications have been marked as taking for the 7/3/25 encounter (Hospital Encounter).

## 2025-07-01 ENCOUNTER — ANESTHESIA EVENT (OUTPATIENT)
Dept: OPERATING ROOM | Facility: HOSPITAL | Age: 35
End: 2025-07-01
Payer: COMMERCIAL

## 2025-07-03 ENCOUNTER — HOSPITAL ENCOUNTER (OUTPATIENT)
Facility: HOSPITAL | Age: 35
Setting detail: OUTPATIENT SURGERY
Discharge: HOME | End: 2025-07-03
Attending: SURGERY | Admitting: SURGERY
Payer: COMMERCIAL

## 2025-07-03 ENCOUNTER — ANESTHESIA (OUTPATIENT)
Dept: OPERATING ROOM | Facility: HOSPITAL | Age: 35
End: 2025-07-03
Payer: COMMERCIAL

## 2025-07-03 VITALS
WEIGHT: 238.76 LBS | DIASTOLIC BLOOD PRESSURE: 64 MMHG | TEMPERATURE: 97.4 F | SYSTOLIC BLOOD PRESSURE: 103 MMHG | RESPIRATION RATE: 16 BRPM | OXYGEN SATURATION: 93 % | HEIGHT: 64 IN | BODY MASS INDEX: 40.76 KG/M2 | HEART RATE: 54 BPM

## 2025-07-03 DIAGNOSIS — R22.0 LUMP OF SCALP: Primary | ICD-10-CM

## 2025-07-03 PROCEDURE — 2500000004 HC RX 250 GENERAL PHARMACY W/ HCPCS (ALT 636 FOR OP/ED): Performed by: SURGERY

## 2025-07-03 PROCEDURE — 11401 EXC TR-EXT B9+MARG 0.6-1 CM: CPT | Performed by: SURGERY

## 2025-07-03 PROCEDURE — 3600000008 HC OR TIME - EACH INCREMENTAL 1 MINUTE - PROCEDURE LEVEL THREE: Performed by: SURGERY

## 2025-07-03 PROCEDURE — 7100000009 HC PHASE TWO TIME - INITIAL BASE CHARGE: Performed by: SURGERY

## 2025-07-03 PROCEDURE — 94640 AIRWAY INHALATION TREATMENT: CPT

## 2025-07-03 PROCEDURE — 2500000004 HC RX 250 GENERAL PHARMACY W/ HCPCS (ALT 636 FOR OP/ED): Performed by: ANESTHESIOLOGY

## 2025-07-03 PROCEDURE — 7100000010 HC PHASE TWO TIME - EACH INCREMENTAL 1 MINUTE: Performed by: SURGERY

## 2025-07-03 PROCEDURE — 3600000003 HC OR TIME - INITIAL BASE CHARGE - PROCEDURE LEVEL THREE: Performed by: SURGERY

## 2025-07-03 PROCEDURE — 88304 TISSUE EXAM BY PATHOLOGIST: CPT | Performed by: PATHOLOGY

## 2025-07-03 PROCEDURE — 2500000005 HC RX 250 GENERAL PHARMACY W/O HCPCS: Performed by: ANESTHESIOLOGY

## 2025-07-03 PROCEDURE — 94664 DEMO&/EVAL PT USE INHALER: CPT

## 2025-07-03 PROCEDURE — 2500000001 HC RX 250 WO HCPCS SELF ADMINISTERED DRUGS (ALT 637 FOR MEDICARE OP): Performed by: ANESTHESIOLOGY

## 2025-07-03 PROCEDURE — 2500000002 HC RX 250 W HCPCS SELF ADMINISTERED DRUGS (ALT 637 FOR MEDICARE OP, ALT 636 FOR OP/ED): Performed by: ANESTHESIOLOGY

## 2025-07-03 PROCEDURE — 3700000001 HC GENERAL ANESTHESIA TIME - INITIAL BASE CHARGE: Performed by: SURGERY

## 2025-07-03 PROCEDURE — 88305 TISSUE EXAM BY PATHOLOGIST: CPT | Performed by: PATHOLOGY

## 2025-07-03 PROCEDURE — 2720000007 HC OR 272 NO HCPCS: Performed by: SURGERY

## 2025-07-03 PROCEDURE — 11402 EXC TR-EXT B9+MARG 1.1-2 CM: CPT | Performed by: SURGERY

## 2025-07-03 PROCEDURE — 88304 TISSUE EXAM BY PATHOLOGIST: CPT | Mod: TC,SUR,ELYLAB,SAMLAB | Performed by: SURGERY

## 2025-07-03 PROCEDURE — 3700000002 HC GENERAL ANESTHESIA TIME - EACH INCREMENTAL 1 MINUTE: Performed by: SURGERY

## 2025-07-03 RX ORDER — ACETAMINOPHEN 325 MG/1
650 TABLET ORAL EVERY 6 HOURS PRN
Start: 2025-07-03 | End: 2025-07-13

## 2025-07-03 RX ORDER — ACETAMINOPHEN 325 MG/1
975 TABLET ORAL ONCE
Status: COMPLETED | OUTPATIENT
Start: 2025-07-03 | End: 2025-07-03

## 2025-07-03 RX ORDER — CIPROFLOXACIN 2 MG/ML
400 INJECTION, SOLUTION INTRAVENOUS ONCE
Status: COMPLETED | OUTPATIENT
Start: 2025-07-03 | End: 2025-07-03

## 2025-07-03 RX ORDER — ONDANSETRON HYDROCHLORIDE 2 MG/ML
4 INJECTION, SOLUTION INTRAVENOUS ONCE
Status: COMPLETED | OUTPATIENT
Start: 2025-07-03 | End: 2025-07-03

## 2025-07-03 RX ORDER — SODIUM CHLORIDE, SODIUM LACTATE, POTASSIUM CHLORIDE, CALCIUM CHLORIDE 600; 310; 30; 20 MG/100ML; MG/100ML; MG/100ML; MG/100ML
50 INJECTION, SOLUTION INTRAVENOUS CONTINUOUS
Status: DISCONTINUED | OUTPATIENT
Start: 2025-07-03 | End: 2025-07-03 | Stop reason: HOSPADM

## 2025-07-03 RX ORDER — IBUPROFEN 600 MG/1
600 TABLET, FILM COATED ORAL EVERY 6 HOURS PRN
Start: 2025-07-03 | End: 2025-07-13

## 2025-07-03 RX ORDER — CEFAZOLIN SODIUM 2 G/50ML
2 SOLUTION INTRAVENOUS ONCE
Status: DISCONTINUED | OUTPATIENT
Start: 2025-07-03 | End: 2025-07-03

## 2025-07-03 RX ORDER — FENTANYL CITRATE 50 UG/ML
INJECTION, SOLUTION INTRAMUSCULAR; INTRAVENOUS AS NEEDED
Status: DISCONTINUED | OUTPATIENT
Start: 2025-07-03 | End: 2025-07-03

## 2025-07-03 RX ORDER — MIDAZOLAM HYDROCHLORIDE 1 MG/ML
INJECTION INTRAMUSCULAR; INTRAVENOUS AS NEEDED
Status: DISCONTINUED | OUTPATIENT
Start: 2025-07-03 | End: 2025-07-03

## 2025-07-03 RX ORDER — MIDAZOLAM HYDROCHLORIDE 1 MG/ML
2 INJECTION INTRAMUSCULAR; INTRAVENOUS ONCE
Status: COMPLETED | OUTPATIENT
Start: 2025-07-03 | End: 2025-07-03

## 2025-07-03 RX ORDER — SCOPOLAMINE 1 MG/3D
1 PATCH, EXTENDED RELEASE TRANSDERMAL ONCE
Status: DISCONTINUED | OUTPATIENT
Start: 2025-07-03 | End: 2025-07-03 | Stop reason: HOSPADM

## 2025-07-03 RX ORDER — PROPOFOL 10 MG/ML
INJECTION, EMULSION INTRAVENOUS CONTINUOUS PRN
Status: DISCONTINUED | OUTPATIENT
Start: 2025-07-03 | End: 2025-07-03

## 2025-07-03 RX ORDER — NORETHINDRONE AND ETHINYL ESTRADIOL 0.5-0.035
KIT ORAL AS NEEDED
Status: DISCONTINUED | OUTPATIENT
Start: 2025-07-03 | End: 2025-07-03

## 2025-07-03 RX ORDER — OXYCODONE HYDROCHLORIDE 5 MG/1
5 TABLET ORAL EVERY 4 HOURS PRN
Status: DISCONTINUED | OUTPATIENT
Start: 2025-07-03 | End: 2025-07-03 | Stop reason: HOSPADM

## 2025-07-03 RX ORDER — FAMOTIDINE 10 MG/ML
20 INJECTION, SOLUTION INTRAVENOUS ONCE
Status: COMPLETED | OUTPATIENT
Start: 2025-07-03 | End: 2025-07-03

## 2025-07-03 RX ORDER — ALBUTEROL SULFATE 0.83 MG/ML
2.5 SOLUTION RESPIRATORY (INHALATION) ONCE
Status: COMPLETED | OUTPATIENT
Start: 2025-07-03 | End: 2025-07-03

## 2025-07-03 RX ORDER — LIDOCAINE HYDROCHLORIDE 10 MG/ML
INJECTION, SOLUTION INFILTRATION; PERINEURAL AS NEEDED
Status: DISCONTINUED | OUTPATIENT
Start: 2025-07-03 | End: 2025-07-03

## 2025-07-03 RX ORDER — DEXAMETHASONE SODIUM PHOSPHATE 10 MG/ML
10 INJECTION INTRAMUSCULAR; INTRAVENOUS ONCE
Status: COMPLETED | OUTPATIENT
Start: 2025-07-03 | End: 2025-07-03

## 2025-07-03 RX ADMIN — FENTANYL CITRATE 50 MCG: 50 INJECTION, SOLUTION INTRAMUSCULAR; INTRAVENOUS at 08:50

## 2025-07-03 RX ADMIN — Medication 10 MG: at 08:52

## 2025-07-03 RX ADMIN — MIDAZOLAM HYDROCHLORIDE 2 MG: 1 INJECTION, SOLUTION INTRAMUSCULAR; INTRAVENOUS at 08:31

## 2025-07-03 RX ADMIN — FAMOTIDINE 20 MG: 10 INJECTION, SOLUTION INTRAVENOUS at 07:51

## 2025-07-03 RX ADMIN — LIDOCAINE HYDROCHLORIDE 30 MG: 10 INJECTION, SOLUTION INFILTRATION; PERINEURAL at 08:48

## 2025-07-03 RX ADMIN — FENTANYL CITRATE 25 MCG: 50 INJECTION, SOLUTION INTRAMUSCULAR; INTRAVENOUS at 09:02

## 2025-07-03 RX ADMIN — ALBUTEROL SULFATE 2.5 MG: 2.5 SOLUTION RESPIRATORY (INHALATION) at 08:10

## 2025-07-03 RX ADMIN — DEXAMETHASONE SODIUM PHOSPHATE 10 MG: 10 INJECTION, SOLUTION INTRAMUSCULAR; INTRAVENOUS at 07:52

## 2025-07-03 RX ADMIN — ONDANSETRON 4 MG: 2 INJECTION INTRAMUSCULAR; INTRAVENOUS at 07:52

## 2025-07-03 RX ADMIN — SCOPOLAMINE 1 PATCH: 1.5 PATCH, EXTENDED RELEASE TRANSDERMAL at 07:49

## 2025-07-03 RX ADMIN — MIDAZOLAM HYDROCHLORIDE 0.5 MG: 1 INJECTION, SOLUTION INTRAMUSCULAR; INTRAVENOUS at 08:58

## 2025-07-03 RX ADMIN — EPHEDRINE SULFATE 10 MG: 50 INJECTION, SOLUTION INTRAVENOUS at 09:09

## 2025-07-03 RX ADMIN — Medication 10 MG: at 08:56

## 2025-07-03 RX ADMIN — PROPOFOL 50 MCG/KG/MIN: 10 INJECTION, EMULSION INTRAVENOUS at 08:50

## 2025-07-03 RX ADMIN — MIDAZOLAM HYDROCHLORIDE 0.5 MG: 1 INJECTION, SOLUTION INTRAMUSCULAR; INTRAVENOUS at 08:52

## 2025-07-03 RX ADMIN — SODIUM CHLORIDE, SODIUM LACTATE, POTASSIUM CHLORIDE, AND CALCIUM CHLORIDE 50 ML/HR: .6; .31; .03; .02 INJECTION, SOLUTION INTRAVENOUS at 07:35

## 2025-07-03 RX ADMIN — CIPROFLOXACIN 400 MG: 400 INJECTION, SOLUTION INTRAVENOUS at 07:50

## 2025-07-03 RX ADMIN — MIDAZOLAM HYDROCHLORIDE 0.5 MG: 1 INJECTION, SOLUTION INTRAMUSCULAR; INTRAVENOUS at 09:02

## 2025-07-03 RX ADMIN — Medication 10 MG: at 09:02

## 2025-07-03 RX ADMIN — FENTANYL CITRATE 25 MCG: 50 INJECTION, SOLUTION INTRAMUSCULAR; INTRAVENOUS at 08:57

## 2025-07-03 RX ADMIN — ACETAMINOPHEN 975 MG: 325 TABLET ORAL at 07:51

## 2025-07-03 SDOH — HEALTH STABILITY: MENTAL HEALTH: CURRENT SMOKER: 0

## 2025-07-03 ASSESSMENT — PAIN SCALES - GENERAL
PAINLEVEL_OUTOF10: 0 - NO PAIN
PAIN_LEVEL: 0
PAINLEVEL_OUTOF10: 0 - NO PAIN

## 2025-07-03 ASSESSMENT — PAIN - FUNCTIONAL ASSESSMENT
PAIN_FUNCTIONAL_ASSESSMENT: 0-10

## 2025-07-03 NOTE — ANESTHESIA POSTPROCEDURE EVALUATION
Patient: Radha Corley    Procedure Summary       Date: 07/03/25 Room / Location: [facility] OR 02 / [facility] OR    Anesthesia Start: 0839 Anesthesia Stop: 0928    Procedure: Excision soft tissue mass on scalp (Left: Head) Diagnosis:       Lump of scalp      (Lump of scalp [R22.0])    Surgeons: Cherise Rose MD Responsible Provider: Radhames Montgomery DO    Anesthesia Type: MAC ASA Status: 2            Anesthesia Type: MAC    Vitals Value Taken Time   /68 07/03/25 09:35   Temp 97.4 07/03/25 09:35   Pulse 63 07/03/25 09:35   Resp 16 07/03/25 09:35   SpO2 98 07/03/25 09:35       Anesthesia Post Evaluation    Patient location during evaluation: bedside  Patient participation: complete - patient participated  Level of consciousness: awake  Pain score: 0  Pain management: adequate  Multimodal analgesia pain management approach  Airway patency: patent  Cardiovascular status: acceptable  Respiratory status: acceptable and nasal cannula  Hydration status: acceptable  Postoperative Nausea and Vomiting: none  Comments: Easily awakens.  VSS.  Denies pain or nausea.  Tolerated procedure well.    Thankful for care provided today        No notable events documented.

## 2025-07-03 NOTE — ANESTHESIA PREPROCEDURE EVALUATION
Patient: Radha Corley    Procedure Information       Date/Time: 07/03/25 0815    Procedure: Excision soft tissue mass on scalp (Left: Head) - Excision scalp cyst    Location: [facility] OR 02 / [facility] OR    Surgeons: Cherise Rose MD            Relevant Problems   Anesthesia   (+) PONV (postoperative nausea and vomiting)   (-) Family history of malignant hyperthermia   (-) Malignant hyperthermia      Cardiac   (+) Primary hypertension      Pulmonary  Allergic Rhinitis      Neuro   (+) Bipolar depression (Multi)   (+) Depression      GI  RARE GERD-avoids triggers   (+) Gastroesophageal reflux disease      /Renal   (+) Kidney stone      Liver (within normal limits)      Endocrine   (+) Class 3 severe obesity without serious comorbidity with body mass index (BMI) of 40.0 to 44.9 in adult      Hematology   (+) Iron deficiency anemia      Musculoskeletal (within normal limits)      HEENT (within normal limits)      Skin  HPI      GYN   (+) Menorrhagia with regular cycle     Problem list reviewed.  Clinical information reviewed:   Tobacco  Allergies  Meds   Med Hx  Surg Hx  OB Status  Fam Hx  Soc   Hx        NPO Detail:  NPO/Void Status  Carbohydrate Drink Given Prior to Surgery? : N  Date of Last Liquid: 07/02/25  Time of Last Liquid: 2300  Date of Last Solid: 07/02/25  Time of Last Solid: 1200  Last Intake Type: Clear fluids  Time of Last Void: 0708         Physical Exam    Airway  Mallampati: II  TM distance: >3 FB  Neck ROM: full     Cardiovascular - normal exam  Rhythm: regular     Dental   Comments: Crown lower right slightly loose     Pulmonary - normal exam   Abdominal            Anesthesia Plan    History of general anesthesia?: yes  History of complications of general anesthesia?: no    ASA 2     MAC     The patient is not a current smoker.    intravenous induction   Anesthetic plan and risks discussed with patient.    MAC discussed with Patient.  Plan and process discussed for anesthesia for  procedure.  Risks and benefits discussed.  All questions answered with patient.  The patient understands plan and wishes to proceed.

## 2025-07-03 NOTE — OP NOTE
Excision soft tissue mass on scalp (L) Operative Note     Date: 7/3/2025  OR Location: [facility] OR    Name: Radha Corley, : 1990, Age: 34 y.o., MRN: [id number], Sex: female    Diagnosis  Pre-op Diagnosis      * Lump of scalp [R22.0] Post-op Diagnosis     * Lump of scalp [R22.0]     Procedures  Excision soft tissue mass on scalp  14882 - MS EXC B9 LESION MRGN XCP SK TG T/A/L 0.6-1.0 CM    Excision soft tissue mass on scalp  61782 - MS EXC B9 LESION MRGN XCP SK TG T/A/L 1.1-2.0 CM    Excision soft tissue mass on scalp  95823 - MS EXC B9 LESION MRGN XCP SK TG T/A/L 1.1-2.0 CM  91192  97829    Surgeons      * Cherise Rose - Primary    Resident/Fellow/Other Assistant:  Surgeons and Role:  * No surgeons found with a matching role *    Staff:   Circulator: Jennifer Abdalla Person: Sari    Anesthesia Staff: Anesthesiologist: Radhames Montgomery DO    Procedure Summary  Anesthesia: Monitor Anesthesia Care  ASA: II  Estimated Blood Loss: 3mL  Intra-op Medications:   Administrations occurring from 815 to 0915 on 25:   Medication Name Total Dose   bupivacaine PF 0.25 % (Marcaine) 0.25 % (2.5 mg/mL) 10 mL, lidocaine PF (Xylocaine) 10 mg/mL (1 %) 10 mL syringe 17.5 mL   ePHEDrine injection 10 mg   fentaNYL (Sublimaze) injection 50 mcg/mL 100 mcg   ketamine injection 50 mg/ 5 mL (10 mg/mL) 30 mg   lidocaine (Xylocaine) injection 1 % 30 mg   midazolam PF (Versed) injection 1 mg/mL 1.5 mg   midazolam (Versed) injection 2 mg 2 mg   propofol (Diprivan) injection 10 mg/mL 135.38 mg              Anesthesia Record               Intraprocedure I/O Totals          Intake    lactated Ringer's infusion 900.00 mL    Total Intake 900 mL          Specimen:   ID Type Source Tests Collected by Time   1 : LEFT ANTERIOR SCALP Tissue SKIN EXCISION SURGICAL PATHOLOGY EXAM Sari Avitia RN 7/3/2025 09   2 : LEFT POSTERIOR SCALP Tissue SKIN EXCISION SURGICAL PATHOLOGY EXAM Sari Avitia RN 7/3/2025 0902   3 : RIGHT ANTERIOR SCALP  Tissue SKIN EXCISION SURGICAL PATHOLOGY EXAM Sari Avitia RN 7/3/2025 0902   4 : RIGHT POSTERIOR SCALP Tissue SKIN EXCISION SURGICAL PATHOLOGY EXAM Sari Avitia RN 7/3/2025 0902   5 : OCCIPITAL Tissue SKIN EXCISION SURGICAL PATHOLOGY EXAM Sari Avitia RN 7/3/2025 0903                 Drains and/or Catheters: * None in log *    Tourniquet Times:         Implants:     Findings: 5 wens of the scalp varying from approximately 1 cm to centimeter and a half    Indications: Radha Corley is an 34 y.o. female who is having surgery for Lump of scalp [R22.0].     The patient was seen in the preoperative area. The risks, benefits, complications, treatment options, non-operative alternatives, expected recovery and outcomes were discussed with the patient. The possibilities of reaction to medication, pulmonary aspiration, injury to surrounding structures, bleeding, recurrent infection, the need for additional procedures, failure to diagnose a condition, and creating a complication requiring transfusion or operation were discussed with the patient. The patient concurred with the proposed plan, giving informed consent.  The site of surgery was properly noted/marked if necessary per policy. The patient has been actively warmed in preoperative area. Preoperative antibiotics have been ordered and given within 1 hours of incision. Venous thrombosis prophylaxis have been ordered including bilateral sequential compression devices    Procedure Details: Patient was brought to the operating room and placed supine upon the room table.  SCD devices were in place.  Operative huddle was done.  The patient was rolled in the right lateral decubitus position and the scalp lesions prepped and draped with Betadine and using gel to spread out the hair.  Timeout was done.  Each cyst was locally anesthetized with half-and-half solution of 0.25% Marcaine and 1% lidocaine.  All were incised over the top and the Wenz shelled out using  hemostats.  The skin was closed with staples and glue.  Patient tolerated procedure well and was sent to the recovery area in stable condition.  All needle and sponge counts were correct.  Specimens are marked as above.  Evidence of Infection: No   Complications:  None; patient tolerated the procedure well.    Disposition: PACU - hemodynamically stable.  Condition: stable       Cherise Rose  Phone Number: [phone]

## 2025-07-18 ENCOUNTER — APPOINTMENT (OUTPATIENT)
Dept: SURGERY | Facility: CLINIC | Age: 35
End: 2025-07-18
Payer: COMMERCIAL

## 2025-07-18 VITALS
HEART RATE: 85 BPM | HEIGHT: 64 IN | DIASTOLIC BLOOD PRESSURE: 76 MMHG | BODY MASS INDEX: 40.63 KG/M2 | SYSTOLIC BLOOD PRESSURE: 136 MMHG | WEIGHT: 238 LBS

## 2025-07-18 DIAGNOSIS — R22.0 LUMP OF SCALP: Primary | ICD-10-CM

## 2025-07-18 PROCEDURE — 3078F DIAST BP <80 MM HG: CPT | Performed by: SURGERY

## 2025-07-18 PROCEDURE — 3075F SYST BP GE 130 - 139MM HG: CPT | Performed by: SURGERY

## 2025-07-18 PROCEDURE — 3008F BODY MASS INDEX DOCD: CPT | Performed by: SURGERY

## 2025-07-18 PROCEDURE — 99024 POSTOP FOLLOW-UP VISIT: CPT | Performed by: SURGERY

## 2025-07-18 NOTE — PROGRESS NOTES
Subjective   Patient ID: Radha Corley is a 34 y.o. female who presents for Post-op (Post Op #1 for scalp excision soft tissue mass x 5 done on 7-3-25.  Patient here for staple removal and denies any swelling, redness or discharge.  States she has been noticing some tenderness on the back of her head from the staple bumping up against things like when sitting on a high back chair.  ).    Objective   Sites are all clean.  Staples are removed along with some of the glue mildly assistant prior to the repair.  Assessment/Plan   Wound care is discussed.  I will message her in [employer] when the results are available but she understands they are probably benign.  I have told her if there is anything else and she needs any further testing or workup I would call her to discuss that.  All questions were answered.         Cherise Rose MD 07/18/25 9:58 AM

## 2025-07-22 LAB
LABORATORY COMMENT REPORT: NORMAL
PATH REPORT.FINAL DX SPEC: NORMAL
PATH REPORT.GROSS SPEC: NORMAL
PATH REPORT.RELEVANT HX SPEC: NORMAL
PATH REPORT.TOTAL CANCER: NORMAL

## 2025-09-07 LAB
ALBUMIN SERPL-MCNC: 4.5 G/DL (ref 3.6–5.1)
ALP SERPL-CCNC: 67 U/L (ref 31–125)
ALT SERPL-CCNC: 28 U/L (ref 6–29)
ANION GAP SERPL CALCULATED.4IONS-SCNC: 9 MMOL/L (CALC) (ref 7–17)
AST SERPL-CCNC: 18 U/L (ref 10–30)
BILIRUB SERPL-MCNC: 0.4 MG/DL (ref 0.2–1.2)
BUN SERPL-MCNC: 12 MG/DL (ref 7–25)
CALCIUM SERPL-MCNC: 9.5 MG/DL (ref 8.6–10.2)
CHLORIDE SERPL-SCNC: 100 MMOL/L (ref 98–110)
CO2 SERPL-SCNC: 30 MMOL/L (ref 20–32)
CREAT SERPL-MCNC: 0.73 MG/DL (ref 0.5–0.97)
EGFRCR SERPLBLD CKD-EPI 2021: 111 ML/MIN/1.73M2
GLUCOSE SERPL-MCNC: 124 MG/DL (ref 65–99)
POTASSIUM SERPL-SCNC: 3.5 MMOL/L (ref 3.5–5.3)
PROT SERPL-MCNC: 7 G/DL (ref 6.1–8.1)
SODIUM SERPL-SCNC: 139 MMOL/L (ref 135–146)

## 2025-09-08 ENCOUNTER — APPOINTMENT (OUTPATIENT)
Age: 35
End: 2025-09-08
Payer: COMMERCIAL

## (undated) DEVICE — STRAP, KNEE AND BODY, SINGLE USE

## (undated) DEVICE — SUTURE, VICRYL PLUS 3-0, SH, 27IN

## (undated) DEVICE — DEVICE, NOVA SURE V5, ENDOMETRIAL ABLATION

## (undated) DEVICE — Device

## (undated) DEVICE — PREP TRAY, VAGINAL

## (undated) DEVICE — SYRINGE, HYPODERMIC, CONTROL, LUER LOCK, 10 CC, PLASTIC, STERILE

## (undated) DEVICE — TUBING, SUCTION, NON-CONDUCTIVE, FEMALE, 6.4MM X 10, STERILE

## (undated) DEVICE — GLOVE, PROTEXIS PI CLASSIC, SZ-6.0, PF, LF

## (undated) DEVICE — ADHESIVE, SKIN, DERMABOND ADVANCED, 15CM, PEN-STYLE

## (undated) DEVICE — SOLUTION, IRRIGATION, 0.9% SODIUM CHLORIDE, 1000 ML, HANG BOTTLE

## (undated) DEVICE — NEEDLE, HYPO, 18G X 1 1/2, SAFETY

## (undated) DEVICE — CANISTER, SUCTION, 3000CC, W/COVER NON ATTACHED, 18IN TUBES

## (undated) DEVICE — TRAY, SKIN SCRUB, CUSTOM (SAMARITAN)

## (undated) DEVICE — SUTURE, MONOCRYL, 4-0, 18 IN, PS2, UNDYED

## (undated) DEVICE — SET, TUBE INFLOW AQUILEX

## (undated) DEVICE — STRAP, ARMBOARD, 3IN, BLUE/WHITE, SECURE HOOK

## (undated) DEVICE — CIRCUIT, BREATHING, ADULT, B/V FILTER, HMEF, 3 L BAG, 108 IN

## (undated) DEVICE — GLOVE, PROTEXIS PI CLASSIC, SZ-7.5, PF, LF

## (undated) DEVICE — TOWEL, OR, XRAY DECTECTABLE, 17 X 27, BLUE, 4/PK, STERILE

## (undated) DEVICE — MASK, FACE, ANESTHESIA, ADULT, LARGE, P6, RED

## (undated) DEVICE — SOLUTION, IRRIGATION, STERILE WATER, 1000 ML, POUR BOTTLE

## (undated) DEVICE — TOWEL, OR, XRAY DECTECTABLE, 17 X 27, BLUE, STERILE

## (undated) DEVICE — NEEDLE, ECLIPSE, 25GA X 1-1/2 IN

## (undated) DEVICE — ELECTRODE, ELECTROSURGICAL, NEEDLE, STERILE, LF

## (undated) DEVICE — LUBRICANT, SURGILUBE, STERILE, 2OZ

## (undated) DEVICE — STAPLER, SKIN, ROTATING HEAD, PROXIMATE, WIDE, 35

## (undated) DEVICE — LINE, GAS SAMPLING, .05IN 1D 10FT, M/M CONNECTORS

## (undated) DEVICE — SOLUTION, PREP, PVP IODINE, FLIP TOP, 4OZ

## (undated) DEVICE — DRAPE, SURGICAL, UNDERBUTTOCKS, DISP